# Patient Record
Sex: FEMALE | Race: WHITE | NOT HISPANIC OR LATINO
[De-identification: names, ages, dates, MRNs, and addresses within clinical notes are randomized per-mention and may not be internally consistent; named-entity substitution may affect disease eponyms.]

---

## 2017-02-14 ENCOUNTER — APPOINTMENT (OUTPATIENT)
Dept: ELECTROPHYSIOLOGY | Facility: CLINIC | Age: 82
End: 2017-02-14

## 2017-05-23 ENCOUNTER — APPOINTMENT (OUTPATIENT)
Dept: ELECTROPHYSIOLOGY | Facility: CLINIC | Age: 82
End: 2017-05-23

## 2017-08-29 ENCOUNTER — APPOINTMENT (OUTPATIENT)
Dept: ELECTROPHYSIOLOGY | Facility: CLINIC | Age: 82
End: 2017-08-29
Payer: MEDICARE

## 2017-08-29 PROCEDURE — 93294 REM INTERROG EVL PM/LDLS PM: CPT

## 2017-08-29 PROCEDURE — 93296 REM INTERROG EVL PM/IDS: CPT

## 2017-09-12 ENCOUNTER — INPATIENT (INPATIENT)
Facility: HOSPITAL | Age: 82
LOS: 5 days | Discharge: HOME | End: 2017-09-18
Attending: HOSPITALIST | Admitting: HOSPITALIST
Payer: MEDICARE

## 2017-09-12 VITALS
OXYGEN SATURATION: 100 % | DIASTOLIC BLOOD PRESSURE: 55 MMHG | RESPIRATION RATE: 18 BRPM | SYSTOLIC BLOOD PRESSURE: 137 MMHG | HEART RATE: 101 BPM | TEMPERATURE: 98 F

## 2017-09-12 DIAGNOSIS — Z95.0 PRESENCE OF CARDIAC PACEMAKER: ICD-10-CM

## 2017-09-12 DIAGNOSIS — R04.2 HEMOPTYSIS: ICD-10-CM

## 2017-09-12 DIAGNOSIS — I10 ESSENTIAL (PRIMARY) HYPERTENSION: ICD-10-CM

## 2017-09-12 DIAGNOSIS — R79.1 ABNORMAL COAGULATION PROFILE: ICD-10-CM

## 2017-09-12 DIAGNOSIS — I48.91 UNSPECIFIED ATRIAL FIBRILLATION: ICD-10-CM

## 2017-09-12 DIAGNOSIS — K92.2 GASTROINTESTINAL HEMORRHAGE, UNSPECIFIED: ICD-10-CM

## 2017-09-12 DIAGNOSIS — Z29.9 ENCOUNTER FOR PROPHYLACTIC MEASURES, UNSPECIFIED: ICD-10-CM

## 2017-09-12 LAB
ALBUMIN SERPL ELPH-MCNC: 3.4 G/DL — SIGNIFICANT CHANGE UP (ref 3.3–5)
ALP SERPL-CCNC: 46 U/L — SIGNIFICANT CHANGE UP (ref 40–120)
ALT FLD-CCNC: 30 U/L — SIGNIFICANT CHANGE UP (ref 4–33)
APTT BLD: 57.5 SEC — HIGH (ref 27.5–37.4)
AST SERPL-CCNC: 23 U/L — SIGNIFICANT CHANGE UP (ref 4–32)
BASOPHILS # BLD AUTO: 0.06 K/UL — SIGNIFICANT CHANGE UP (ref 0–0.2)
BASOPHILS NFR BLD AUTO: 0.8 % — SIGNIFICANT CHANGE UP (ref 0–2)
BILIRUB SERPL-MCNC: 1.1 MG/DL — SIGNIFICANT CHANGE UP (ref 0.2–1.2)
BLD GP AB SCN SERPL QL: NEGATIVE — SIGNIFICANT CHANGE UP
BUN SERPL-MCNC: 26 MG/DL — HIGH (ref 7–23)
CALCIUM SERPL-MCNC: 8.6 MG/DL — SIGNIFICANT CHANGE UP (ref 8.4–10.5)
CHLORIDE SERPL-SCNC: 111 MMOL/L — HIGH (ref 98–107)
CO2 SERPL-SCNC: 19 MMOL/L — LOW (ref 22–31)
CREAT SERPL-MCNC: 1.24 MG/DL — SIGNIFICANT CHANGE UP (ref 0.5–1.3)
EOSINOPHIL # BLD AUTO: 0.19 K/UL — SIGNIFICANT CHANGE UP (ref 0–0.5)
EOSINOPHIL NFR BLD AUTO: 2.5 % — SIGNIFICANT CHANGE UP (ref 0–6)
GLUCOSE SERPL-MCNC: 109 MG/DL — HIGH (ref 70–99)
HCT VFR BLD CALC: 26.9 % — LOW (ref 34.5–45)
HCT VFR BLD CALC: 31 % — LOW (ref 34.5–45)
HGB BLD-MCNC: 10.2 G/DL — LOW (ref 11.5–15.5)
HGB BLD-MCNC: 8.9 G/DL — LOW (ref 11.5–15.5)
IMM GRANULOCYTES # BLD AUTO: 0.06 # — SIGNIFICANT CHANGE UP
IMM GRANULOCYTES NFR BLD AUTO: 0.8 % — SIGNIFICANT CHANGE UP (ref 0–1.5)
INR BLD: 2.44 — HIGH (ref 0.88–1.17)
INR BLD: 6.94 — CRITICAL HIGH (ref 0.88–1.17)
LYMPHOCYTES # BLD AUTO: 0.65 K/UL — LOW (ref 1–3.3)
LYMPHOCYTES # BLD AUTO: 8.5 % — LOW (ref 13–44)
MAGNESIUM SERPL-MCNC: 1.8 MG/DL — SIGNIFICANT CHANGE UP (ref 1.6–2.6)
MCHC RBC-ENTMCNC: 29.6 PG — SIGNIFICANT CHANGE UP (ref 27–34)
MCHC RBC-ENTMCNC: 29.8 PG — SIGNIFICANT CHANGE UP (ref 27–34)
MCHC RBC-ENTMCNC: 32.9 % — SIGNIFICANT CHANGE UP (ref 32–36)
MCHC RBC-ENTMCNC: 33.1 % — SIGNIFICANT CHANGE UP (ref 32–36)
MCV RBC AUTO: 89.4 FL — SIGNIFICANT CHANGE UP (ref 80–100)
MCV RBC AUTO: 90.6 FL — SIGNIFICANT CHANGE UP (ref 80–100)
MONOCYTES # BLD AUTO: 0.82 K/UL — SIGNIFICANT CHANGE UP (ref 0–0.9)
MONOCYTES NFR BLD AUTO: 10.8 % — SIGNIFICANT CHANGE UP (ref 2–14)
NEUTROPHILS # BLD AUTO: 5.84 K/UL — SIGNIFICANT CHANGE UP (ref 1.8–7.4)
NEUTROPHILS NFR BLD AUTO: 76.6 % — SIGNIFICANT CHANGE UP (ref 43–77)
NRBC # FLD: 0 — SIGNIFICANT CHANGE UP
NRBC # FLD: 0 — SIGNIFICANT CHANGE UP
OB PNL STL: POSITIVE — SIGNIFICANT CHANGE UP
PHOSPHATE SERPL-MCNC: 2.8 MG/DL — SIGNIFICANT CHANGE UP (ref 2.5–4.5)
PLATELET # BLD AUTO: 190 K/UL — SIGNIFICANT CHANGE UP (ref 150–400)
PLATELET # BLD AUTO: 239 K/UL — SIGNIFICANT CHANGE UP (ref 150–400)
PMV BLD: 10.7 FL — SIGNIFICANT CHANGE UP (ref 7–13)
PMV BLD: 11.5 FL — SIGNIFICANT CHANGE UP (ref 7–13)
POTASSIUM SERPL-MCNC: 3.7 MMOL/L — SIGNIFICANT CHANGE UP (ref 3.5–5.3)
POTASSIUM SERPL-SCNC: 3.7 MMOL/L — SIGNIFICANT CHANGE UP (ref 3.5–5.3)
PROT SERPL-MCNC: 5.7 G/DL — LOW (ref 6–8.3)
PROTHROM AB SERPL-ACNC: 27.8 SEC — HIGH (ref 9.8–13.1)
PROTHROM AB SERPL-ACNC: 80.9 SEC — HIGH (ref 9.8–13.1)
RBC # BLD: 3.01 M/UL — LOW (ref 3.8–5.2)
RBC # BLD: 3.42 M/UL — LOW (ref 3.8–5.2)
RBC # FLD: 16.4 % — HIGH (ref 10.3–14.5)
RBC # FLD: 16.5 % — HIGH (ref 10.3–14.5)
RH IG SCN BLD-IMP: POSITIVE — SIGNIFICANT CHANGE UP
SODIUM SERPL-SCNC: 144 MMOL/L — SIGNIFICANT CHANGE UP (ref 135–145)
WBC # BLD: 7.62 K/UL — SIGNIFICANT CHANGE UP (ref 3.8–10.5)
WBC # BLD: 7.65 K/UL — SIGNIFICANT CHANGE UP (ref 3.8–10.5)
WBC # FLD AUTO: 7.62 K/UL — SIGNIFICANT CHANGE UP (ref 3.8–10.5)
WBC # FLD AUTO: 7.65 K/UL — SIGNIFICANT CHANGE UP (ref 3.8–10.5)

## 2017-09-12 PROCEDURE — 99222 1ST HOSP IP/OBS MODERATE 55: CPT | Mod: GC

## 2017-09-12 PROCEDURE — 93279 PRGRMG DEV EVAL PM/LDLS PM: CPT | Mod: 26,GC

## 2017-09-12 PROCEDURE — 99223 1ST HOSP IP/OBS HIGH 75: CPT | Mod: GC

## 2017-09-12 PROCEDURE — 71020: CPT | Mod: 26

## 2017-09-12 PROCEDURE — 74176 CT ABD & PELVIS W/O CONTRAST: CPT | Mod: 26

## 2017-09-12 PROCEDURE — 71250 CT THORAX DX C-: CPT | Mod: 26

## 2017-09-12 RX ORDER — WARFARIN SODIUM 2.5 MG/1
1 TABLET ORAL
Qty: 0 | Refills: 0 | COMMUNITY

## 2017-09-12 RX ORDER — LOSARTAN POTASSIUM 100 MG/1
50 TABLET, FILM COATED ORAL DAILY
Qty: 0 | Refills: 0 | Status: DISCONTINUED | OUTPATIENT
Start: 2017-09-12 | End: 2017-09-12

## 2017-09-12 RX ORDER — AZITHROMYCIN 500 MG/1
500 TABLET, FILM COATED ORAL EVERY 24 HOURS
Qty: 0 | Refills: 0 | Status: DISCONTINUED | OUTPATIENT
Start: 2017-09-13 | End: 2017-09-13

## 2017-09-12 RX ORDER — ATORVASTATIN CALCIUM 80 MG/1
40 TABLET, FILM COATED ORAL AT BEDTIME
Qty: 0 | Refills: 0 | Status: DISCONTINUED | OUTPATIENT
Start: 2017-09-12 | End: 2017-09-18

## 2017-09-12 RX ORDER — AZITHROMYCIN 500 MG/1
500 TABLET, FILM COATED ORAL ONCE
Qty: 0 | Refills: 0 | Status: COMPLETED | OUTPATIENT
Start: 2017-09-12 | End: 2017-09-12

## 2017-09-12 RX ORDER — CEFTRIAXONE 500 MG/1
1 INJECTION, POWDER, FOR SOLUTION INTRAMUSCULAR; INTRAVENOUS ONCE
Qty: 0 | Refills: 0 | Status: COMPLETED | OUTPATIENT
Start: 2017-09-12 | End: 2017-09-12

## 2017-09-12 RX ORDER — CEFTRIAXONE 500 MG/1
INJECTION, POWDER, FOR SOLUTION INTRAMUSCULAR; INTRAVENOUS
Qty: 0 | Refills: 0 | Status: DISCONTINUED | OUTPATIENT
Start: 2017-09-12 | End: 2017-09-13

## 2017-09-12 RX ORDER — IPRATROPIUM/ALBUTEROL SULFATE 18-103MCG
3 AEROSOL WITH ADAPTER (GRAM) INHALATION EVERY 6 HOURS
Qty: 0 | Refills: 0 | Status: DISCONTINUED | OUTPATIENT
Start: 2017-09-12 | End: 2017-09-18

## 2017-09-12 RX ORDER — PENTOXIFYLLINE 400 MG
400 TABLET, EXTENDED RELEASE ORAL THREE TIMES A DAY
Qty: 0 | Refills: 0 | Status: DISCONTINUED | OUTPATIENT
Start: 2017-09-12 | End: 2017-09-18

## 2017-09-12 RX ORDER — INFLUENZA VIRUS VACCINE 15; 15; 15; 15 UG/.5ML; UG/.5ML; UG/.5ML; UG/.5ML
0.5 SUSPENSION INTRAMUSCULAR ONCE
Qty: 0 | Refills: 0 | Status: DISCONTINUED | OUTPATIENT
Start: 2017-09-12 | End: 2017-09-18

## 2017-09-12 RX ORDER — PANTOPRAZOLE SODIUM 20 MG/1
8 TABLET, DELAYED RELEASE ORAL
Qty: 80 | Refills: 0 | Status: DISCONTINUED | OUTPATIENT
Start: 2017-09-12 | End: 2017-09-12

## 2017-09-12 RX ORDER — PANTOPRAZOLE SODIUM 20 MG/1
80 TABLET, DELAYED RELEASE ORAL ONCE
Qty: 0 | Refills: 0 | Status: COMPLETED | OUTPATIENT
Start: 2017-09-12 | End: 2017-09-12

## 2017-09-12 RX ORDER — PHYTONADIONE (VIT K1) 5 MG
5 TABLET ORAL ONCE
Qty: 0 | Refills: 0 | Status: COMPLETED | OUTPATIENT
Start: 2017-09-12 | End: 2017-09-12

## 2017-09-12 RX ORDER — AZITHROMYCIN 500 MG/1
TABLET, FILM COATED ORAL
Qty: 0 | Refills: 0 | Status: DISCONTINUED | OUTPATIENT
Start: 2017-09-12 | End: 2017-09-13

## 2017-09-12 RX ORDER — CEFTRIAXONE 500 MG/1
1 INJECTION, POWDER, FOR SOLUTION INTRAMUSCULAR; INTRAVENOUS EVERY 24 HOURS
Qty: 0 | Refills: 0 | Status: DISCONTINUED | OUTPATIENT
Start: 2017-09-13 | End: 2017-09-13

## 2017-09-12 RX ORDER — PANTOPRAZOLE SODIUM 20 MG/1
80 TABLET, DELAYED RELEASE ORAL ONCE
Qty: 0 | Refills: 0 | Status: DISCONTINUED | OUTPATIENT
Start: 2017-09-12 | End: 2017-09-12

## 2017-09-12 RX ORDER — WARFARIN SODIUM 2.5 MG/1
2 TABLET ORAL
Qty: 0 | Refills: 0 | COMMUNITY

## 2017-09-12 RX ADMIN — AZITHROMYCIN 250 MILLIGRAM(S): 500 TABLET, FILM COATED ORAL at 22:05

## 2017-09-12 RX ADMIN — Medication 5 MILLIGRAM(S): at 12:22

## 2017-09-12 RX ADMIN — PANTOPRAZOLE SODIUM 80 MILLIGRAM(S): 20 TABLET, DELAYED RELEASE ORAL at 13:02

## 2017-09-12 RX ADMIN — Medication 400 MILLIGRAM(S): at 22:06

## 2017-09-12 RX ADMIN — CEFTRIAXONE 100 GRAM(S): 500 INJECTION, POWDER, FOR SOLUTION INTRAMUSCULAR; INTRAVENOUS at 21:13

## 2017-09-12 RX ADMIN — ATORVASTATIN CALCIUM 40 MILLIGRAM(S): 80 TABLET, FILM COATED ORAL at 22:06

## 2017-09-12 NOTE — H&P ADULT - PROBLEM SELECTOR PLAN 2
CT Chest showing Right upper lobe ground-glass opacity which may represent   pulmonary hemorrhage. DDx: Lung malignancy given pt extensive smoking hx vs. bronchitis.  - Pulm consulted will f/u recs ? possible bronchoscopy  - Hb stable 10.2- will repeat stat CBC. F/u results  - Trend CBC, PT/INR  - Transfuse Hb < 7

## 2017-09-12 NOTE — H&P ADULT - NSHPLABSRESULTS_GEN_ALL_CORE
10.2   7.62  )-----------( 239      ( 12 Sep 2017 10:15 )             31.0       09-12    144  |  111<H>  |  26<H>  ----------------------------<  109<H>  3.7   |  19<L>  |  1.24    Ca    8.6      12 Sep 2017 14:30  Phos  2.8     09-12  Mg     1.8     09-12    TPro  5.7<L>  /  Alb  3.4  /  TBili  1.1  /  DBili  x   /  AST  23  /  ALT  30  /  AlkPhos  46  09-12        < from: Xray Chest 2 Views PA/Lat (09.12.17 @ 11:17) >    INTERPRETATION:     Right-sided single lead pacemaker is present. The lungs are clear. There   is a trace right and probably left effusion. No cardiomegaly or vascular   congestion to indicate CHF.      COMPARISON:  July 12, 2013 without significant change.      IMPRESSION:  Clear lungs with trace right and probably left effusions.            < end of copied text >      < from: CT Chest No Cont (09.12.17 @ 13:39) >    FINDINGS:    CHEST:     LUNGS AND LARGE AIRWAYS: Patent central airways. Central ground-glass   opacity in the right upper lobe.  PLEURA: Small right and trace left pleural effusions.  VESSELS: Within normal limits.  HEART: Cardiomegaly. Pacemaker lead in right ventricle. Coronary arterial   calcifications. No pericardial effusion.  MEDIASTINUM AND MACY: Subcentimeter mediastinal lymph nodes.  CHEST WALL AND LOWER NECK: Within normal limits.    ABDOMEN AND PELVIS:    LIVER: Within normal limits.  BILE DUCTS: Cholelithiasis.  GALLBLADDER: Within normal limits.  SPLEEN: Within normal limits.  PANCREAS: Within normal limits.  ADRENALS: 1.4 cm right adrenal nodule. Unremarkable left adrenal gland.  KIDNEYS/URETERS: 2 mm non obstructing calculus in the upper pole left   kidney.    BLADDER: Within normal limits.  REPRODUCTIVE ORGANS: Hysterectomy.    BOWEL: No bowel obstruction. Unchanged surgical anastomosis at the   descending colon. Small hiatal hernia.   PERITONEUM: No ascites.  VESSELS: Two femoral-femoral bypass graft again noted. Extensive   atherosclerotic calcifications.   RETROPERITONEUM: No lymphadenopathy.    ABDOMINAL WALL: Within normal limits.  BONES: Degenerative changes.    IMPRESSION: Right upper lobe groundglass opacity which may represent   pulmonary hemorrhage.  Small right pleural effusion.            PT/INR - ( 12 Sep 2017 10:15 )   PT: 80.9 SEC;   INR: 6.94          PTT - ( 12 Sep 2017 10:15 )  PTT:57.5 SEC    Lactate Trend            CAPILLARY BLOOD GLUCOSE

## 2017-09-12 NOTE — ED PROVIDER NOTE - PSH
S/P colon resection  1993 for colon ca.  S/P hysterectomy    S/P mastectomy  1990  S/P peripheral artery bypass  in 1994

## 2017-09-12 NOTE — ED PROVIDER NOTE - CRITICAL CARE INDICATION, MLM
patient was critically ill... Due to the presence of bleeding and arrythmia and the risk of sudden rapid deterioration due to my attendance to this patient required critical care time of approx 35 minutes including assessment/reassessment, documentation, ordering and interpreting ancillary studies, discussion with ED staff and consultants, patient and their family, and excludes time spent in teaching of Residents and time spent on separately billable procedures.

## 2017-09-12 NOTE — CONSULT NOTE ADULT - ASSESSMENT
Impression:     1. Acute anemia: hb went from 13 in 2016 to 10. She has no evidence of overt bleeding. She understands that she could have  microscopic bleeding from an ulcer or a cancer of her colon or upper GI tract or an angioectasia, but she prefers conservative therapy with watchful waiting rather than pursuing endoscopy.    2. History of colon cancer:  -patient with no desire for further surveillance.    Recommendation:  -protonix 40 daily  -await normal INR  -no plan for endoscopic evaluation per patient's wishes  -trend H/H for now  -may have usual diet.

## 2017-09-12 NOTE — ED ADULT NURSE NOTE - OBJECTIVE STATEMENT
89 y/o female presents to ED with c/o high INR.  Pt states that she went to her PMD yesterday with c/o cough and weakness x 1 week.  Pt states that she had blood drawn and a CXR was done.  Pt states that she was started on antibiotics for bronchitis, pt then states that she was called this AM and told to come to ED because her INR was high.  Pt awake alert in NAD, denies any bruising or bleeding.  No CP, no SOB, denies abd pain no fever/chills no n/v/d.  IV established labs drawn and sent.

## 2017-09-12 NOTE — CONSULT NOTE ADULT - ATTENDING COMMENTS
Pt still with hemoptysis. Advised to continue monitoring after INR reversal and with antibiotics for PNA and cough suppressants on board. Will follow.

## 2017-09-12 NOTE — H&P ADULT - PROBLEM SELECTOR PLAN 3
Given pt CT Chest, hemoptysis likely from pulmonary source however cannot r/o GI causes w/ Positive FOBT in ED. Ddx: PUD, malignancy, angiodysplasia/ectasia   - GI consulted will f/u recs for ?EGD vs. Colonoscopy.   - Transfuse Hb <7 Given findings of pt CT Chest in setting of hemoptysis, recent episode is likely from pulmonary source however cannot r/o GI causes w/ Positive FOBT in ED. DDx: PUD, malignancy, angiodysplasia/ectasia, varices  - GI consulted will f/u recs for ?EGD vs. Colonoscopy.   - Transfuse Hb <7

## 2017-09-12 NOTE — CONSULT NOTE ADULT - SUBJECTIVE AND OBJECTIVE BOX
CHIEF COMPLAINT: Hemoptosis    HPI: " 89 yo F w/ PMHx of PPM (2013), A-fib on coumadin, HTN, colon cancer s/p hemicolectomy (1993), breast cancer s/p mastectomy, LLE arterial bypass who presented to the ED  elevated INR 6.7 and Hgb 11 (baseline 13-14 based on lab records on HIE). Pt states that she was seen by her PMD and had lab work drawn yesterday and started on cefuroxime for bronchitis due to cough and was called to go into the ER as her blood count was low and INR was high. Pt states all weak she has been feeling very weak all week and was having trouble walking around and becoming short of breath. Pt denies hemoptysis with cough but was blackish brown in color. No vomiting and no abdominal pain, no blood or black colored stools but last BM 3 days prior. Pt denies double vision and headaches. Pt denies chest pain. "        PAST MEDICAL & SURGICAL HISTORY:  Pacemaker  Colon cancer  Breast cancer  HTN (hypertension)  Renal insufficiency  Atrial fibrillation  S/P hysterectomy  S/P colon resection: 1993 for colon ca.  S/P mastectomy: 1990  S/P peripheral artery bypass: in 1994      FAMILY HISTORY:  No pertinent family history in first degree relatives      SOCIAL HISTORY:  Smoking: __ packs x ___ years  EtOH Use:  Marital Status:  Occupation:  Recent Travel:  Country of Birth:  Advance Directives:    Allergies    No Known Allergies    Intolerances        HOME MEDICATIONS:    REVIEW OF SYSTEMS:    [ ] All other systems negative  [ ] Unable to assess ROS because ________    OBJECTIVE:  ICU Vital Signs Last 24 Hrs  T(C): 36.8 (12 Sep 2017 10:00), Max: 36.8 (12 Sep 2017 10:00)  T(F): 98.3 (12 Sep 2017 10:00), Max: 98.3 (12 Sep 2017 10:00)  HR: 101 (12 Sep 2017 10:00) (101 - 101)  BP: 137/55 (12 Sep 2017 10:00) (137/55 - 137/55)  BP(mean): --  ABP: --  ABP(mean): --  RR: 18 (12 Sep 2017 10:00) (18 - 18)  SpO2: 100% (12 Sep 2017 10:00) (100% - 100%)        CAPILLARY BLOOD GLUCOSE          PHYSICAL EXAM:  General: Awake, alert, oriented X 3.   HEENT: Atraumatic, normocephalic.                Mallampatti Grade 4  Lymph Nodes: No palpable lymphadenopathy  Neck: No JVD. No carotid bruit.   Respiratory: Bilateral inspiratory and expiratory crackles, more at the base than the apex. No wheezing.   Cardiovascular: S1 S2 normal.Abdomen: Soft, non-tender, non-distended. No organomegaly.  Extremities: Warm to touch. 1+ pitting edema noted in both lower extremities this am.  Skin: No rashes or skin lesions  Neurological: Motor and sensory examination equal and normal in all four extremities.  Psychiatry: Appropriate mood and affect.    HOSPITAL MEDICATIONS:  MEDICATIONS  (STANDING):  atorvastatin 40 milliGRAM(s) Oral at bedtime  pentoxifylline 400 milliGRAM(s) Oral three times a day    MEDICATIONS  (PRN):      LABS:                        10.2   7.62  )-----------( 239      ( 12 Sep 2017 10:15 )             31.0     09-12    144  |  111<H>  |  26<H>  ----------------------------<  109<H>  3.7   |  19<L>  |  1.24    Ca    8.6      12 Sep 2017 14:30  Phos  2.8     09-12  Mg     1.8     09-12    TPro  5.7<L>  /  Alb  3.4  /  TBili  1.1  /  DBili  x   /  AST  23  /  ALT  30  /  AlkPhos  46  09-12    PT/INR - ( 12 Sep 2017 10:15 )   PT: 80.9 SEC;   INR: 6.94          PTT - ( 12 Sep 2017 10:15 )  PTT:57.5 SEC          MICROBIOLOGY:     RADIOLOGY:  [x] Reviewed and interpreted by me  Significant amount of ground glass on the right side with out Consolidation or mass.  May reperesent Blood vs Blood super imposed on infection.         EKG: CHIEF COMPLAINT: Hemoptosis    HPI: 88 yo F w/ PMHx of PPM (), A-fib on coumadin, HTN, colon cancer s/p hemicolectomy (), breast cancer s/p mastectomy, LLE arterial bypass who was sent in by her PMD  for elevated INR and hemoptysis.  She has been unwell for almost 3 weeks with intial URI symptoms that evoloved to sweats, chills, and chest tight ness, 5 days prior to presentation she had developed a cough with some blood streeks, that progressed to clots.  Small volume and always mixed with sputum.   She went to her PMD who took labs, and started on abx.  Once her INR came back he sent her to the ED.    She deneis sick contacts, fevers, SOB, nausea or vomiting.  Does say she ahs periodic BRight red streaks on her BMS but this is the first time she had it in her sputum.    ROS+ for chest tight ness and wheeze during coughing fits. No weight loss but poor apetite over the last 3 weeks.     PAST MEDICAL & SURGICAL HISTORY:  Pacemaker  Colon cancer  Breast cancer  HTN (hypertension)  Renal insufficiency  Atrial fibrillation  S/P hysterectomy  S/P colon resection:  for colon ca.  S/P mastectomy:   S/P peripheral artery bypass: in       FAMILY HISTORY:  No pertinent family history in first degree relatives      SOCIAL HISTORY:  Smokinpack years  EtOH Use:  Marital Status:  Occupation:  Recent Travel:  Country of Birth: [facility]  Advance Directives: DNR/DNI    Allergies    No Known Allergies    Intolerances        HOME MEDICATIONS:    REVIEW OF SYSTEMS:    [x] All other systems negative  [ ] Unable to assess ROS because ________    OBJECTIVE:  ICU Vital Signs Last 24 Hrs  T(C): 36.8 (12 Sep 2017 10:00), Max: 36.8 (12 Sep 2017 10:00)  T(F): 98.3 (12 Sep 2017 10:00), Max: 98.3 (12 Sep 2017 10:00)  HR: 101 (12 Sep 2017 10:00) (101 - 101)  BP: 137/55 (12 Sep 2017 10:00) (137/55 - 137/55)  BP(mean): --  ABP: --  ABP(mean): --  RR: 18 (12 Sep 2017 10:00) (18 - 18)  SpO2: 100% (12 Sep 2017 10:00) (100% - 100%)        CAPILLARY BLOOD GLUCOSE          PHYSICAL EXAM:  Se below    HOSPITAL MEDICATIONS:  MEDICATIONS  (STANDING):  atorvastatin 40 milliGRAM(s) Oral at bedtime  pentoxifylline 400 milliGRAM(s) Oral three times a day    MEDICATIONS  (PRN):      LABS:                        10.2   7.62  )-----------( 239      ( 12 Sep 2017 10:15 )             31.0     09-12    144  |  111<H>  |  26<H>  ----------------------------<  109<H>  3.7   |  19<L>  |  1.24    Ca    8.6      12 Sep 2017 14:30  Phos  2.8     09-12  Mg     1.8     -12    TPro  5.7<L>  /  Alb  3.4  /  TBili  1.1  /  DBili  x   /  AST  23  /  ALT  30  /  AlkPhos  46  09-12    PT/INR - ( 12 Sep 2017 10:15 )   PT: 80.9 SEC;   INR: 6.94          PTT - ( 12 Sep 2017 10:15 )  PTT:57.5 SEC          MICROBIOLOGY:     RADIOLOGY:  [x] Reviewed and interpreted by me  Significant amount of ground glass on the right side with out Consolidation or mass.  May reperesent Blood vs Blood super imposed on infection.         EKG:

## 2017-09-12 NOTE — ED PROVIDER NOTE - ATTENDING CONTRIBUTION TO CARE
I was physically present for the E/M service provided. I agree with above history, physical, and plan which I have reviewed and edited where appropriate. I was physically present for the key portions of the service provided.    89 yo F w/ PMHx of PPM, A-fib on coumadin, HTN who p/w elevated INR 6.7 and Hgb 11 (baseline 13-14 based on lab records on HIE). lab drawn yesterday after c/o cough and hemoptysis x 1 wk.  INR was elevated and told to come to ed.  Pt denies any change in meds except for having started on cefepime 1 day ago. + decrease appetite. no vomiting, no diarrhea, no bloody stool, no hematuria, no cp, no sob, no palpitations. pt checks her PPm at home q3 month and goes to office annual for check.  denies any trauma or syncope.    *GEN:   comfortable, in no apparent distress, AOx3  *EYES:   PERRL, extra-occular movements intact  *HEENT:   airway patent, moist mucosal membranes, uvula midline  *CV:   regular rate and rhythm, normal S1/S2, no murmur  *RESP:   clear to auscultation bilaterally, non-labored, speaking in full sentences  *ABD:   soft, non tender, no guarding  *:   no cva tenderness  *MSK:   no musculoskeletal tenderness, 5/5 strength, moving all extremity  *SKIN:   dry, intact, no rash  *NEURO:   AOx3, no focal weakness or loss of sensation, gait normal, GCS 15    Concern for GIB due to elevated INR vs lung ca vs bronchitis.  will obtain labs, ct chest and pelvis w/o due to ckd  + guaiaic. h/h 10.2/31. EKg shows inappropriate PPm capture at T wave.  cardiology consulted and ep eval -states oversensing and changed the setting to appropriately capture.  INR 6.4.  Vitamin k 5mg po given.  PPI bolus.  GI and pulm consulted.  Right upper lobe groundglass opacity which may represent   pulmonary hemorrhage.  Will admit to medicine tele for GIB with elevated INR.  also abnormal PPM.  pt is hemodynamically stable.

## 2017-09-12 NOTE — ED PROVIDER NOTE - MEDICAL DECISION MAKING DETAILS
Pt is an 89 yo F w/ PMHx of PPM, A-fib on coumadin, HTN who p/w Pt is an 89 yo F w/ PMHx of PPM, A-fib on coumadin, HTN who p/w elevated INR 6.7 and Hgb 11 and hemoptysis not hemodynamically unstable and no neurologic symptoms no need for head imaging at this time. Patient with non-massive hemoptysis and coughing for 3 days will do CXR and CT chest A/P w/out con given renal dysfunction in past. Will also get CBC, CMP, PT/INR, aPTT, type and screen, Will give oral Vit K, patient consented for blood and patient with advanced directive on her person and is DNR/I with no aggressive medical interventions that would prolong her life if acutely dying. Also abnormal capture of PPM on EKG and will need EP consult. Patient will need admission for hemoptysis and elevated INR. Will notify Pts PMD.

## 2017-09-12 NOTE — H&P ADULT - ASSESSMENT
89 yo F w/ PMHx of PPM (2013), A-fib on coumadin, HTN, colon cancer s/p hemicolectomy (1993), breast cancer s/p mastectomy, LLE arterial bypass who presented to the ED per referral from her PMD for elevated INR s/p episode of hemoptysis.

## 2017-09-12 NOTE — H&P ADULT - NSHPPHYSICALEXAM_GEN_ALL_CORE
PHYSICAL EXAM:    General: Well appearing female, No acute distress, lying comfortably in bed, AAOX3  HEENT: Normocephalic, atraumatic.  PERRL.  EOMI.  No scleral icterus.  Moist MM.  No oropharyngeal exudates.    Neck: Supple.  Full range of motion.  No JVD.  No carotid bruits.  No thyromegaly.  Trachea midline.  No lymphadenopathy.   Heart: RRR.  Normal S1 and S2.  No murmurs, rubs, or gallops.   Lungs: CTAB. No wheezes, crackles, or rhonchi.    Abdomen: BS+, soft, NT/ND.  No organomegaly.  Skin: Warm and dry.  No rashes.  Extremities: No edema, clubbing, or cyanosis.  2+ peripheral pulses b/l.  Musculoskeletal: No deformities.  No spinal or paraspinal tenderness.  Neuro: CN II-XII intact.  5/5 strength in UE and LE b/l.  Tactile sensation intact in UE and LE b/l.  Cerebellar function intact as assessed by finger-to-nose test.

## 2017-09-12 NOTE — H&P ADULT - HISTORY OF PRESENT ILLNESS
Pt is an 89 yo F w/ PMHx of PPM (2013), A-fib on coumadin, HTN, colon cancer s/p hemicolectomy (1993), breast cancer s/p mastectomy, LLE arterial bypass who presented to the ED per reffe who p/w elevated INR 6.7 and Hgb 11 (baseline 13-14 based on lab records on HIE). Pt states that she was seen by her PMD and had lab work drawn yesterday and started on cefuroxime for bronchitis due to cough and was called to go into the ER as her blood count was low and INR was high. Pt states all weak she has been feeling very weak all week and was having trouble walking around and becoming short of breath. Pt denies hemoptysis with cough but was blackish brown in color. No vomiting and no abdominal pain, no blood or black colored stools but last BM 3 days prior. Pt denies double vision and headaches. Pt denies chest pain. 88 yo F w/ PMHx of PPM (2013), A-fib on coumadin, HTN, colon cancer s/p hemicolectomy (1993), breast cancer s/p mastectomy, LLE arterial bypass who presented to the ED per referral from her PMD for elevated INR. Pt was in her usual state of health until last week when she began experiencing weakness, lethargy, SOB, and productive cough. Pt notes her symptoms progressively worsened throughout last week prompting her to visit her PMD. Pt was given Cefuroxime x 3 doses and stopped for presumed bronchitis. Pt was called this AM for elevated INR of 6.9 and Hb 11 (baseline 13-14 based on lab records on HIE). Pt endorses week long hx of productive cough of brown/black sputum which she says she has never experienced before. This AM pt reports coughing up bright red blood. Pt says she has been adherent and strict with her coumadin and takes her medications appropriately.  Pt endorses smoking 2-3ppd for 40+ years- quit in 1993. No FH of colon CA or coagulopathies.  Denies any new medications other than Cefuroxime at PMD, changes in diet, weight loss, fevers, chills,n/v, cp, sob, abd pain, hematuria, hematochezia, or melena. 89 yo F w/ PMHx of PPM (2013), A-fib on coumadin, HTN, colon cancer s/p hemicolectomy (1993), breast cancer s/p mastectomy, LLE arterial bypass who presented to the ED per referral from her PMD for elevated INR. Pt was in her usual state of health until last week when she began experiencing weakness, lethargy, SOB, and productive cough. Pt notes her symptoms progressively worsened throughout last week prompting her to visit her PMD. Pt was given Cefuroxime for presumed bronchitis- pt stopped after 3 doses. Pt was called this AM for elevated INR of 6.9 and Hb 11 (baseline 13-14 based on lab records on HIE). Repeat in ED 6.7. Pt endorses week long hx of productive cough of brown/black sputum which she says she has never experienced before. This AM pt reports coughing up bright red blood. Pt reports she has been adherent and strict with her coumadin and takes her medications appropriately.  Pt endorses smoking 2-3ppd for 40+ years- quit in 1993. No FH of colon CA or coagulopathies.  Denies any new medications other than Cefuroxime at PMD, changes in diet, weight loss, fevers, chills,n/v, cp, sob, abd pain, hematuria, hematochezia, or melena.

## 2017-09-12 NOTE — ED ADULT TRIAGE NOTE - CHIEF COMPLAINT QUOTE
Pt states her doctor called her today and told her to go to ED for elevated INR. Unsure of exact value. On coumadin.

## 2017-09-12 NOTE — CONSULT NOTE ADULT - ASSESSMENT
89 yo F w/ PMHx of PPM (2013), A-fib on coumadin, HTN, colon cancer s/p hemicolectomy (1993), breast cancer s/p mastectomy, LLE arterial bypass presented with hemoptysis in the setting of bronchitis and an elevated INR. 89 yo F w/ PMHx of PPM (2013), A-fib on coumadin, HTN, colon cancer s/p hemicolectomy (1993), breast cancer s/p mastectomy, LLE arterial bypass presented with hemoptysis in the setting of bronchitis/pna related cough and an elevated INR.  - Reverse INR, continous pulse oximetery  - would Complete course of ABX for CAP as unable to r/o PNA in the setting of GGO and hemorahge.   - PRN lucas.  - Consider follow up Imaging after resolution of hemoptosis to evaluate for underlying eteology (infection, malignancy).     - Will follow   Mckinley Roy MD PGY5  Pulmonary and Critical Care Fellow  p# [phone]

## 2017-09-12 NOTE — H&P ADULT - PROBLEM SELECTOR PLAN 1
Elevated INR 6.7 on admission. S/p 5 units vit K in ED  Ddx: Diet changes, medication non adherence, recent Cefuroxime at PMD for presumed bronchitis  - Holding coumadin- will repeat INR in AM.   - Elevated INR 6.7 on admission. S/p 5 units vit K in ED  DDx: Diet changes, medication non adherence, recent Cefuroxime at PMD for presumed bronchitis  - Holding coumadin- will repeat INR in AM. Elevated INR 6.7 on admission. S/p 5 units vit K in ED  DDx: Diet changes, medication non adherence, recent Cefuroxime at PMD for presumed bronchitis  - Holding coumadin- will repeat INR in AM.  - Pt s/p Vitamin K

## 2017-09-12 NOTE — H&P ADULT - PMH
Atrial fibrillation    Breast cancer    Colon cancer    HTN (hypertension)    Pacemaker    Renal insufficiency

## 2017-09-12 NOTE — ED PROVIDER NOTE - PROGRESS NOTE DETAILS
GI consulted. Given IVP protonix. Will admit to medicine hospitalist. Left a message for PMD.   -Zan Dove PGY3 EP saw pt and patient under sensing and adjusted sensitivities.   -Zan Dove PGY3

## 2017-09-12 NOTE — ED ADULT NURSE NOTE - CHPI ED SYMPTOMS NEG
no pain/no dizziness/no nausea/no numbness/no tingling/no chills/no fever/no decreased eating/drinking/no vomiting

## 2017-09-12 NOTE — H&P ADULT - PROBLEM SELECTOR PLAN 5
Home meds Norvasc 10mg QD and Losartan 50mg qd  Will hold home BP meds for now and re-evaluate in AM given recent episode of hemoptysis and drop in Hb.  - Pt currently hemodynamically stable, will cont to monitor.

## 2017-09-12 NOTE — H&P ADULT - PROBLEM SELECTOR PLAN 4
EP consulted in ED to evaluate PPM. PPM showed +RV lead under-sensing with over pacing and excellent threshold capture. EP consulted in ED to evaluate PPM. PPM showed +RV lead under-sensing with over pacing and excellent threshold capture. PPM reprogrammed to avoid under sensing.

## 2017-09-12 NOTE — H&P ADULT - NSHPSOCIALHISTORY_GEN_ALL_CORE
Smoked 2-3 ppd for 40+ years- quit in 1993  Denies any ETOH abuse or recreational drug use  Lives alone at home. No children or family in her life at this time. Fully functional- able to do ADL's independently w/o difficulty.

## 2017-09-12 NOTE — CONSULT NOTE ADULT - SUBJECTIVE AND OBJECTIVE BOX
Chief Complaint:  Patient is a 89y old  Female who presents with a chief complaint of Elevated INR (12 Sep 2017 17:14)      HPI: This is an 89 year old female with a hx of afib on warfarin, colon cancer s/p resection in the 90s who presented with supratherapeutic INR. The patient was started on cefuroxime 2 days PTA for a PNA diagnosed by her outpatient PMD. She endorsed CASAS and generalized fatigue. She denies melena hematochezia, hematuria or vaginal bleeding. She had no syncope or presyncope or abdominal pain. She does not use NSAIDs. Her PMD called her and sent her to ED for elevated INR but didnt say how high it was. Her last colonoscopy was many years ago and she is unsure if she has had upper endoscopy.    Allergies:  No Known Allergies      Home Medications:    Hospital Medications:  atorvastatin 40 milliGRAM(s) Oral at bedtime  pentoxifylline 400 milliGRAM(s) Oral three times a day  cefTRIAXone   IVPB 1 Gram(s) IV Intermittent once  azithromycin  IVPB 500 milliGRAM(s) IV Intermittent once  ALBUTerol/ipratropium for Nebulization 3 milliLiter(s) Nebulizer every 6 hours PRN  cefTRIAXone   IVPB      azithromycin  IVPB          PMHX/PSHX:  Pacemaker  Colon cancer  Breast cancer  HTN (hypertension)  Renal insufficiency  Atrial fibrillation  S/P hysterectomy  S/P colon resection  S/P mastectomy  S/P peripheral artery bypass      Family history:  No pertinent family history in first degree relatives      Social History:     ROS:     General:  No wt loss, fevers, chills, night sweats, fatigue,   Eyes:  Good vision, no reported pain  ENT:  No sore throat, pain, runny nose, dysphagia  CV:  No pain, palpitations, hypo/hypertension  Resp:  No dyspnea, cough, tachypnea, wheezing  GI:  See HPI  :  No pain, bleeding, incontinence, nocturia  Muscle:  No pain, weakness  Neuro:  No weakness, tingling, memory problems  Psych:  No fatigue, insomnia, mood problems, depression  Endocrine:  No polyuria, polydipsia, cold/heat intolerance  Heme:  No petechiae, ecchymosis, easy bruisability  Skin:  No rash, edema      PHYSICAL EXAM:     GENERAL:  Appears stated age, well-groomed, well-nourished, no distress  HEENT:  NC/AT,  conjunctivae clear and pink,  no JVD  CHEST:  RLL crackles  HEART:  irregular rhythm, S1, S2, no murmur/rub/S3/S4, no abdominal bruit, no edema  ABDOMEN:  Soft, non-tender, non-distended, normoactive bowel sounds,  no masses , no hepatosplenomegaly  EXTREMITIES:  no cyanosis,clubbing or edema  SKIN:  No rash/erythema/ecchymoses/petechiae/wounds/abscess/warm/dry  NEURO:  Alert, oriented  RECTAL:Brown stool, no masses palpated.    Vital Signs:  Vital Signs Last 24 Hrs  T(C): 37.1 (12 Sep 2017 18:52), Max: 37.1 (12 Sep 2017 18:52)  T(F): 98.8 (12 Sep 2017 18:52), Max: 98.8 (12 Sep 2017 18:52)  HR: 94 (12 Sep 2017 18:52) (94 - 101)  BP: 143/67 (12 Sep 2017 18:52) (137/55 - 143/67)  BP(mean): --  RR: 18 (12 Sep 2017 18:52) (18 - 18)  SpO2: 98% (12 Sep 2017 18:52) (98% - 100%)  Daily     Daily     LABS:                        10.2   7.62  )-----------( 239      ( 12 Sep 2017 10:15 )             31.0     09-12    144  |  111<H>  |  26<H>  ----------------------------<  109<H>  3.7   |  19<L>  |  1.24    Ca    8.6      12 Sep 2017 14:30  Phos  2.8     09-12  Mg     1.8     09-12    TPro  5.7<L>  /  Alb  3.4  /  TBili  1.1  /  DBili  x   /  AST  23  /  ALT  30  /  AlkPhos  46  09-12    LIVER FUNCTIONS - ( 12 Sep 2017 14:30 )  Alb: 3.4 g/dL / Pro: 5.7 g/dL / ALK PHOS: 46 u/L / ALT: 30 u/L / AST: 23 u/L / GGT: x           PT/INR - ( 12 Sep 2017 10:15 )   PT: 80.9 SEC;   INR: 6.94          PTT - ( 12 Sep 2017 10:15 )  PTT:57.5 SEC        Imaging:

## 2017-09-12 NOTE — ED PROVIDER NOTE - OBJECTIVE STATEMENT
Pt is an 88 yo F w/ PMHx of PPM, A-fib on coumadin, HTN who p/w elevated INR 6.7 and Hgb 11 (baseline 13-14 based on lab records on [facility]). Pt states that she was seen by her PMD and had lab work drawn today and was called to go into the ER as her blood count was low and INR was high. Pt states Pt is an 89 yo F w/ PMHx of PPM, A-fib on coumadin, HTN who p/w elevated INR 6.7 and Hgb 11 (baseline 13-14 based on lab records on [facility]). Pt states that she was seen by her PMD and had lab work drawn yesterday and started on cefuroxime for bronchitis due to cough and was called to go into the ER as her blood count was low and INR was high. Pt states all weak she has been feeling very weak all week and was having trouble walking around and becoming short of breath. Pt denies hemoptysis with cough but was blackish brown in color. No vomiting and no abdominal pain, no blood or black colored stools but last BM 3 days prior. Pt denies double vision and headaches. Pt denies chest pain.     PMD: Dr. Cool [phone]

## 2017-09-13 LAB
BLD GP AB SCN SERPL QL: NEGATIVE — SIGNIFICANT CHANGE UP
BUN SERPL-MCNC: 25 MG/DL — HIGH (ref 7–23)
CALCIUM SERPL-MCNC: 8.7 MG/DL — SIGNIFICANT CHANGE UP (ref 8.4–10.5)
CHLORIDE SERPL-SCNC: 112 MMOL/L — HIGH (ref 98–107)
CO2 SERPL-SCNC: 20 MMOL/L — LOW (ref 22–31)
CREAT SERPL-MCNC: 1.22 MG/DL — SIGNIFICANT CHANGE UP (ref 0.5–1.3)
GLUCOSE SERPL-MCNC: 84 MG/DL — SIGNIFICANT CHANGE UP (ref 70–99)
HCT VFR BLD CALC: 27 % — LOW (ref 34.5–45)
HGB BLD-MCNC: 9 G/DL — LOW (ref 11.5–15.5)
INR BLD: 1.78 — HIGH (ref 0.88–1.17)
MAGNESIUM SERPL-MCNC: 1.8 MG/DL — SIGNIFICANT CHANGE UP (ref 1.6–2.6)
MCHC RBC-ENTMCNC: 30.2 PG — SIGNIFICANT CHANGE UP (ref 27–34)
MCHC RBC-ENTMCNC: 33.3 % — SIGNIFICANT CHANGE UP (ref 32–36)
MCV RBC AUTO: 90.6 FL — SIGNIFICANT CHANGE UP (ref 80–100)
NRBC # FLD: 0 — SIGNIFICANT CHANGE UP
PHOSPHATE SERPL-MCNC: 2.4 MG/DL — LOW (ref 2.5–4.5)
PLATELET # BLD AUTO: 210 K/UL — SIGNIFICANT CHANGE UP (ref 150–400)
PMV BLD: 11.4 FL — SIGNIFICANT CHANGE UP (ref 7–13)
POTASSIUM SERPL-MCNC: 3.6 MMOL/L — SIGNIFICANT CHANGE UP (ref 3.5–5.3)
POTASSIUM SERPL-SCNC: 3.6 MMOL/L — SIGNIFICANT CHANGE UP (ref 3.5–5.3)
PROTHROM AB SERPL-ACNC: 20.2 SEC — HIGH (ref 9.8–13.1)
RBC # BLD: 2.98 M/UL — LOW (ref 3.8–5.2)
RBC # FLD: 16.4 % — HIGH (ref 10.3–14.5)
RH IG SCN BLD-IMP: POSITIVE — SIGNIFICANT CHANGE UP
SODIUM SERPL-SCNC: 146 MMOL/L — HIGH (ref 135–145)
WBC # BLD: 8.45 K/UL — SIGNIFICANT CHANGE UP (ref 3.8–10.5)
WBC # FLD AUTO: 8.45 K/UL — SIGNIFICANT CHANGE UP (ref 3.8–10.5)

## 2017-09-13 PROCEDURE — 99232 SBSQ HOSP IP/OBS MODERATE 35: CPT | Mod: GC

## 2017-09-13 PROCEDURE — 99222 1ST HOSP IP/OBS MODERATE 55: CPT | Mod: GC

## 2017-09-13 PROCEDURE — 99233 SBSQ HOSP IP/OBS HIGH 50: CPT | Mod: GC

## 2017-09-13 RX ORDER — PANTOPRAZOLE SODIUM 20 MG/1
40 TABLET, DELAYED RELEASE ORAL DAILY
Qty: 0 | Refills: 0 | Status: DISCONTINUED | OUTPATIENT
Start: 2017-09-13 | End: 2017-09-13

## 2017-09-13 RX ORDER — POTASSIUM PHOSPHATE, MONOBASIC POTASSIUM PHOSPHATE, DIBASIC 236; 224 MG/ML; MG/ML
15 INJECTION, SOLUTION INTRAVENOUS ONCE
Qty: 0 | Refills: 0 | Status: COMPLETED | OUTPATIENT
Start: 2017-09-13 | End: 2017-09-13

## 2017-09-13 RX ORDER — PANTOPRAZOLE SODIUM 20 MG/1
40 TABLET, DELAYED RELEASE ORAL
Qty: 0 | Refills: 0 | Status: DISCONTINUED | OUTPATIENT
Start: 2017-09-13 | End: 2017-09-18

## 2017-09-13 RX ORDER — AZITHROMYCIN 500 MG/1
500 TABLET, FILM COATED ORAL EVERY 24 HOURS
Qty: 0 | Refills: 0 | Status: DISCONTINUED | OUTPATIENT
Start: 2017-09-13 | End: 2017-09-18

## 2017-09-13 RX ORDER — CEFTRIAXONE 500 MG/1
1 INJECTION, POWDER, FOR SOLUTION INTRAMUSCULAR; INTRAVENOUS EVERY 24 HOURS
Qty: 0 | Refills: 0 | Status: DISCONTINUED | OUTPATIENT
Start: 2017-09-13 | End: 2017-09-18

## 2017-09-13 RX ADMIN — Medication 100 MILLIGRAM(S): at 17:19

## 2017-09-13 RX ADMIN — Medication 200 MILLIGRAM(S): at 12:25

## 2017-09-13 RX ADMIN — POTASSIUM PHOSPHATE, MONOBASIC POTASSIUM PHOSPHATE, DIBASIC 62.5 MILLIMOLE(S): 236; 224 INJECTION, SOLUTION INTRAVENOUS at 09:19

## 2017-09-13 RX ADMIN — CEFTRIAXONE 100 GRAM(S): 500 INJECTION, POWDER, FOR SOLUTION INTRAMUSCULAR; INTRAVENOUS at 17:19

## 2017-09-13 RX ADMIN — Medication 400 MILLIGRAM(S): at 14:34

## 2017-09-13 RX ADMIN — PANTOPRAZOLE SODIUM 40 MILLIGRAM(S): 20 TABLET, DELAYED RELEASE ORAL at 12:28

## 2017-09-13 RX ADMIN — ATORVASTATIN CALCIUM 40 MILLIGRAM(S): 80 TABLET, FILM COATED ORAL at 22:06

## 2017-09-13 RX ADMIN — AZITHROMYCIN 250 MILLIGRAM(S): 500 TABLET, FILM COATED ORAL at 17:18

## 2017-09-13 RX ADMIN — Medication 400 MILLIGRAM(S): at 05:16

## 2017-09-13 RX ADMIN — Medication 400 MILLIGRAM(S): at 22:06

## 2017-09-13 NOTE — PHYSICAL THERAPY INITIAL EVALUATION ADULT - PERTINENT HX OF CURRENT PROBLEM, REHAB EVAL
89 y.o Female w/ PMHx of PPM, A-fib on coumadin, HTN, colon cancer, breast cancer s/p mastectomy, LLE arterial bypass who presented to the ED per referral from her PMD for elevated INR s/p episode of hemoptysis.

## 2017-09-13 NOTE — PROGRESS NOTE ADULT - SUBJECTIVE AND OBJECTIVE BOX
Chief complaint: Patient is a 89y old Female who was seen for follow up for elevated INR (12 Sep 2017 17:14)      SUBJECTIVE / OVERNIGHT EVENTS: No acute events over night. Pt reports feeling well. No active complaints. Repeat Hb 8.9 INR 2.44----This AM Hb 9 INR 1.78. Denies any fevers, chills, n/v, cp, sob, abd pain, hematuria, hematochezia, or melena.    MEDICATIONS  (STANDING):  atorvastatin 40 milliGRAM(s) Oral at bedtime  pentoxifylline 400 milliGRAM(s) Oral three times a day  cefTRIAXone   IVPB      azithromycin  IVPB      cefTRIAXone   IVPB 1 Gram(s) IV Intermittent every 24 hours  azithromycin  IVPB 500 milliGRAM(s) IV Intermittent every 24 hours  influenza   Vaccine 0.5 milliLiter(s) IntraMuscular once    MEDICATIONS  (PRN):  ALBUTerol/ipratropium for Nebulization 3 milliLiter(s) Nebulizer every 6 hours PRN Shortness of Breath and/or Wheezing      T(C): 37.1 (09-13-17 @ 05:13), Max: 37.3 (09-13-17 @ 00:55)  HR: 81 (09-13-17 @ 05:13) (81 - 101)  BP: 133/73 (09-13-17 @ 05:13) (122/64 - 143/67)  RR: 17 (09-13-17 @ 05:13) (17 - 20)  SpO2: 96% (09-13-17 @ 05:13) (96% - 100%)    PHYSICAL EXAM:  GENERAL: NAD, well-developed  HEAD:  Atraumatic, Normocephalic  EYES: EOMI, PERRLA, conjunctiva and sclera clear  NECK: Supple, No JVD  CHEST/LUNG: Clear to auscultation bilaterally; No wheeze  HEART: Regular rate and rhythm; No murmurs, rubs, or gallops  ABDOMEN: Soft, Nontender, Nondistended; Bowel sounds present  EXTREMITIES:  2+ Peripheral Pulses, No clubbing, cyanosis, or edema  PSYCH: AAOx3  NEUROLOGY: non-focal  SKIN: No rashes or lesions    LABS:                        9.0    8.45  )-----------( 210      ( 13 Sep 2017 05:07 )             27.0     WBC Trend: 8.45<--, 7.65<--, 7.62<--  09-12    144  |  111<H>  |  26<H>  ----------------------------<  109<H>  3.7   |  19<L>  |  1.24    Ca    8.6      12 Sep 2017 14:30  Phos  2.8     09-12  Mg     1.8     09-12    TPro  5.7<L>  /  Alb  3.4  /  TBili  1.1  /  DBili  x   /  AST  23  /  ALT  30  /  AlkPhos  46  09-12    Creatinine Trend: 1.24<--  PT/INR - ( 13 Sep 2017 05:07 )   PT: 20.2 SEC;   INR: 1.78          PTT - ( 12 Sep 2017 10:15 )  PTT:57.5 SEC            RADIOLOGY & ADDITIONAL TESTS:    Imaging Personally Reviewed:    Consultant(s) Notes Reviewed:      Care Discussed with Consultants/Other Providers:

## 2017-09-13 NOTE — PROGRESS NOTE ADULT - PROBLEM SELECTOR PLAN 1
Elevated INR 6.7 on admission. S/p 5 units vit K in ED  DDx: Diet changes, medication non adherence, recent Cefuroxime at PMD for presumed bronchitis  - Holding coumadin- repeat Hb 9/INR 1.78 (9/13/17)  - Pt s/p Vitamin K and FFP

## 2017-09-13 NOTE — PROGRESS NOTE ADULT - ASSESSMENT
Impression:   1. Anemia: unclear acuity as last blood count was in 3/16. Patient denies overt GI bleeding and there was no melena on rectal exam. She is not interested in colonoscopy but would consider upper endoscopy. She could have slow GI bleeding from PUD, angiodysplasia or esophagogastric malignancy or colorectal cancer. Given significant dyspnea and possible    2. Dyspnea.    Recommendation:  - Impression:   1. Anemia: unclear acuity as last blood count was in 3/16. Patient denies overt GI bleeding and there was no melena on rectal exam. She is not interested in colonoscopy but would consider upper endoscopy. She could have slow GI bleeding from PUD, angiodysplasia or esophagogastric malignancy or colorectal cancer. Given significant dyspnea and possible    2. Dyspnea.    Recommendation:  -optimize from a cardiopulmonary perspective and then would consider non-emergent EGD if the patient is amenable.

## 2017-09-13 NOTE — PROGRESS NOTE ADULT - ASSESSMENT
88 yo F w/ PMHx of PPM (2013), A-fib on coumadin, HTN, colon cancer s/p hemicolectomy (1993), breast cancer s/p mastectomy, LLE arterial bypass who presented to the ED per referral from her PMD for elevated INR s/p episode of hemoptysis.

## 2017-09-13 NOTE — PROGRESS NOTE ADULT - ATTENDING COMMENTS
Pt seen and examined, chart and labs reviewed.  A very pleasant 89F with hx as above who presents with supratherapeutic INR in setting of hemoptysis and FOBT+ stool.  Pt is fully cognizant and able to make medical decisions and refusing endoscopic intervention for possible GI bleed.  INR now improved s/p Vit K therapy, however after discussion with cardiologist, we including the patient are in agreement with stopping her coumadin altogether and resuming high dose aspirin.  Appreciate pulmonary recommendations and will continue CAP coverage and pt will need surveillance CT scan in near future to assess pulmonary hemorrhage.     #Acute blood loss anemia in setting of supratherapeutic INR, hemoptysis, possible GI bleed: pt declining GI intervention.  Continue to monitor hemoptysis.  INR now improved s/p Vit K, will stop Coumadin indefinitely and start high dose ASA (discussed with outpt cardiologist)     Agree with remainder of resident note as above.

## 2017-09-13 NOTE — PROGRESS NOTE ADULT - PROBLEM SELECTOR PLAN 3
Given findings of pt CT Chest in setting of hemoptysis, recent episode is likely from pulmonary source however cannot r/o GI causes w/ Positive FOBT in ED. DDx: PUD, malignancy, angiodysplasia/ectasia, varices  - GI recs: Protonix 40mg QD, Pt aware of possibility of GI bleed+ risks/benefits of intervention however, she would not like any invasive procedure at this time  - Transfuse Hb <7

## 2017-09-13 NOTE — PROGRESS NOTE ADULT - SUBJECTIVE AND OBJECTIVE BOX
Patient is a 89y old  Female who presents with a chief complaint of Elevated INR (12 Sep 2017 17:14)      SUBJECTIVE / OVERNIGHT EVENTS: Patient with significant shortness of breath and weakness. Denies melena or hematemesis. She does endorse hemoptysis over recent days.    MEDICATIONS  (STANDING):  atorvastatin 40 milliGRAM(s) Oral at bedtime  pentoxifylline 400 milliGRAM(s) Oral three times a day  cefTRIAXone   IVPB      azithromycin  IVPB      cefTRIAXone   IVPB 1 Gram(s) IV Intermittent every 24 hours  azithromycin  IVPB 500 milliGRAM(s) IV Intermittent every 24 hours  influenza   Vaccine 0.5 milliLiter(s) IntraMuscular once  potassium phosphate IVPB 15 milliMole(s) IV Intermittent once  pantoprazole  Injectable 40 milliGRAM(s) IV Push daily    MEDICATIONS  (PRN):  ALBUTerol/ipratropium for Nebulization 3 milliLiter(s) Nebulizer every 6 hours PRN Shortness of Breath and/or Wheezing        CAPILLARY BLOOD GLUCOSE        I&O's Summary      ROS:  General: no fevers chills  Neuro: No headache  MSK: No back pain  Cardiac: No chest pain, palpitations  Pulmonary: SOB  GI: As per HPI  : No dysuria or hesitancy  Skin: No rash or pruritis      PHYSICAL EXAM:  GENERAL: NAD, well-developed  HEAD:  Atraumatic, Normocephalic  EYES: EOMI, PERRLA, conjunctiva and sclera anicteric  NECK: Supple, No JVD  CHEST/LUNG: RLL crackles.  HEART: Regular rate and rhythm; No murmurs, rubs, or gallops  ABDOMEN: Soft, Nontender, Nondistended; Bowel sounds present, no hepatosplenomegaly, no rebound or guarding  EXTREMITIES:  2+ Peripheral Pulses, No clubbing, cyanosis, or edema  PSYCH: AAOx3  NEUROLOGY: non-focal, no asterixis  SKIN: No rashes or lesions, no palmar erythema or Dupuytren's contracture, no gynecomastia    LABS:                        9.0    8.45  )-----------( 210      ( 13 Sep 2017 05:07 )             27.0     09-13    146<H>  |  112<H>  |  25<H>  ----------------------------<  84  3.6   |  20<L>  |  1.22    Ca    8.7      13 Sep 2017 05:07  Phos  2.4     09-13  Mg     1.8     09-13    TPro  5.7<L>  /  Alb  3.4  /  TBili  1.1  /  DBili  x   /  AST  23  /  ALT  30  /  AlkPhos  46  09-12    LIVER FUNCTIONS - ( 12 Sep 2017 14:30 )  Alb: 3.4 g/dL / Pro: 5.7 g/dL / ALK PHOS: 46 u/L / ALT: 30 u/L / AST: 23 u/L / GGT: x           PT/INR - ( 13 Sep 2017 05:07 )   PT: 20.2 SEC;   INR: 1.78          PTT - ( 12 Sep 2017 10:15 )  PTT:57.5 SEC          RADIOLOGY & ADDITIONAL TESTS:    < from: CT Abdomen and Pelvis No Cont (09.12.17 @ 13:39) >    EXAM:  CT ABDOMEN AND PELVIS      EXAM:  CT CHEST        PROCEDURE DATE:  Sep 12 2017         INTERPRETATION:  CLINICAL INFORMATION: Smoker with prior cancer.   Hemoptysis.    COMPARISON: CTA abdomen and pelvis 7/9/2013.    PROCEDURE:   CT of the Chest, Abdomen and Pelvis was performed without intravenous   contrast.   Intravenous contrast: None.  Oral contrast: None.  Sagittal and coronal reformats were performed.    Evaluation of parenchymal organs and vascular structures is limited   withoutintravenous contrast.    FINDINGS:    CHEST:     LUNGS AND LARGE AIRWAYS: Patent central airways. Central groundglass   opacity in the right upper lobe.  PLEURA: Small right and trace left pleural effusions.  VESSELS: Within normal limits.  HEART: Cardiomegaly. Pacemaker lead in right ventricle. Coronary arterial   calcifications. No pericardial effusion.  MEDIASTINUM AND MACY: Subcentimeter mediastinal lymph nodes.  CHEST WALL AND LOWER NECK: Within normal limits.    ABDOMEN AND PELVIS:    LIVER: Within normal limits.  BILE DUCTS: Cholelithiasis.  GALLBLADDER: Within normal limits.  SPLEEN: Within normal limits.  PANCREAS: Within normal limits.  ADRENALS: 1.4 cm right adrenal nodule. Unremarkable left adrenal gland.  KIDNEYS/URETERS: 2 mm nonobstructing calculus in the upper pole left   kidney.    BLADDER: Within normal limits.  REPRODUCTIVE ORGANS: Hysterectomy.    BOWEL: No bowel obstruction. Unchanged surgical anastomosis at the   descending colon. Small hiatal hernia.   PERITONEUM: No ascites.  VESSELS: Two femoral-femoral bypass graft again noted. Extensive   atherosclerotic calcifications.   RETROPERITONEUM: No lymphadenopathy.    ABDOMINAL WALL: Within normal limits.  BONES: Degenerative changes.    IMPRESSION: Right upper lobe groundglass opacity which may represent   pulmonary hemorrhage.  Small right pleural effusion.                    BRIDGER TREJO M.D., ATTENDING RADIOLOGIST  This document has been electronically signed. Sep 12 2017  2:01PM                  < end of copied text >

## 2017-09-13 NOTE — PROGRESS NOTE ADULT - PROBLEM SELECTOR PLAN 4
EP consulted in ED to evaluate PPM. PPM showed +RV lead under-sensing with over pacing and excellent threshold capture. PPM reprogrammed to avoid under sensing.

## 2017-09-13 NOTE — PROGRESS NOTE ADULT - PROBLEM SELECTOR PLAN 6
Atrial Fibrillation on Coumadin. Holding coumadin for now given recent episode of hemoptysis likely 2/2 to pulmonary hemorrhage.   Will cont to monitor.

## 2017-09-13 NOTE — PROGRESS NOTE ADULT - PROBLEM SELECTOR PLAN 2
CT Chest showing Right upper lobe ground-glass opacity which may represent   pulmonary hemorrhage. DDx: Lung malignancy given pt extensive smoking hx vs. bronchitis.  - Pulm recs:  Reverse INR, continuos pulse oximetry/ Cont w/ ABX for presumed PNA at this time. Unable to r/o PNA in setting of GGO and hemorrhage   - Trend CBC, PT/INR  - Transfuse Hb < 7

## 2017-09-14 LAB
BUN SERPL-MCNC: 23 MG/DL — SIGNIFICANT CHANGE UP (ref 7–23)
CALCIUM SERPL-MCNC: 8.7 MG/DL — SIGNIFICANT CHANGE UP (ref 8.4–10.5)
CHLORIDE SERPL-SCNC: 113 MMOL/L — HIGH (ref 98–107)
CO2 SERPL-SCNC: 19 MMOL/L — LOW (ref 22–31)
CREAT SERPL-MCNC: 1.34 MG/DL — HIGH (ref 0.5–1.3)
GLUCOSE SERPL-MCNC: 95 MG/DL — SIGNIFICANT CHANGE UP (ref 70–99)
HCT VFR BLD CALC: 27.6 % — LOW (ref 34.5–45)
HGB BLD-MCNC: 8.7 G/DL — LOW (ref 11.5–15.5)
INR BLD: 1.23 — HIGH (ref 0.88–1.17)
MAGNESIUM SERPL-MCNC: 1.9 MG/DL — SIGNIFICANT CHANGE UP (ref 1.6–2.6)
MCHC RBC-ENTMCNC: 29.6 PG — SIGNIFICANT CHANGE UP (ref 27–34)
MCHC RBC-ENTMCNC: 31.5 % — LOW (ref 32–36)
MCV RBC AUTO: 93.9 FL — SIGNIFICANT CHANGE UP (ref 80–100)
NRBC # FLD: 0 — SIGNIFICANT CHANGE UP
PHOSPHATE SERPL-MCNC: 3.3 MG/DL — SIGNIFICANT CHANGE UP (ref 2.5–4.5)
PLATELET # BLD AUTO: 233 K/UL — SIGNIFICANT CHANGE UP (ref 150–400)
PMV BLD: 11.1 FL — SIGNIFICANT CHANGE UP (ref 7–13)
POTASSIUM SERPL-MCNC: 4.7 MMOL/L — SIGNIFICANT CHANGE UP (ref 3.5–5.3)
POTASSIUM SERPL-SCNC: 4.7 MMOL/L — SIGNIFICANT CHANGE UP (ref 3.5–5.3)
PROTHROM AB SERPL-ACNC: 13.8 SEC — HIGH (ref 9.8–13.1)
RBC # BLD: 2.94 M/UL — LOW (ref 3.8–5.2)
RBC # FLD: 16.5 % — HIGH (ref 10.3–14.5)
SODIUM SERPL-SCNC: 144 MMOL/L — SIGNIFICANT CHANGE UP (ref 135–145)
WBC # BLD: 8.02 K/UL — SIGNIFICANT CHANGE UP (ref 3.8–10.5)
WBC # FLD AUTO: 8.02 K/UL — SIGNIFICANT CHANGE UP (ref 3.8–10.5)

## 2017-09-14 PROCEDURE — 99232 SBSQ HOSP IP/OBS MODERATE 35: CPT | Mod: GC

## 2017-09-14 PROCEDURE — 99233 SBSQ HOSP IP/OBS HIGH 50: CPT | Mod: GC

## 2017-09-14 RX ORDER — SENNA PLUS 8.6 MG/1
2 TABLET ORAL AT BEDTIME
Qty: 0 | Refills: 0 | Status: DISCONTINUED | OUTPATIENT
Start: 2017-09-14 | End: 2017-09-18

## 2017-09-14 RX ORDER — DOCUSATE SODIUM 100 MG
100 CAPSULE ORAL THREE TIMES A DAY
Qty: 0 | Refills: 0 | Status: DISCONTINUED | OUTPATIENT
Start: 2017-09-14 | End: 2017-09-18

## 2017-09-14 RX ORDER — ONDANSETRON 8 MG/1
4 TABLET, FILM COATED ORAL ONCE
Qty: 0 | Refills: 0 | Status: COMPLETED | OUTPATIENT
Start: 2017-09-14 | End: 2017-09-14

## 2017-09-14 RX ORDER — SIMETHICONE 80 MG/1
80 TABLET, CHEWABLE ORAL
Qty: 0 | Refills: 0 | Status: DISCONTINUED | OUTPATIENT
Start: 2017-09-14 | End: 2017-09-18

## 2017-09-14 RX ADMIN — Medication 100 MILLIGRAM(S): at 11:43

## 2017-09-14 RX ADMIN — Medication 100 MILLIGRAM(S): at 22:01

## 2017-09-14 RX ADMIN — AZITHROMYCIN 250 MILLIGRAM(S): 500 TABLET, FILM COATED ORAL at 18:14

## 2017-09-14 RX ADMIN — Medication 100 MILLIGRAM(S): at 17:30

## 2017-09-14 RX ADMIN — PANTOPRAZOLE SODIUM 40 MILLIGRAM(S): 20 TABLET, DELAYED RELEASE ORAL at 06:17

## 2017-09-14 RX ADMIN — CEFTRIAXONE 100 GRAM(S): 500 INJECTION, POWDER, FOR SOLUTION INTRAMUSCULAR; INTRAVENOUS at 17:30

## 2017-09-14 RX ADMIN — SIMETHICONE 80 MILLIGRAM(S): 80 TABLET, CHEWABLE ORAL at 01:12

## 2017-09-14 RX ADMIN — Medication 100 MILLIGRAM(S): at 14:21

## 2017-09-14 RX ADMIN — Medication 400 MILLIGRAM(S): at 06:17

## 2017-09-14 RX ADMIN — ONDANSETRON 4 MILLIGRAM(S): 8 TABLET, FILM COATED ORAL at 01:12

## 2017-09-14 RX ADMIN — Medication 400 MILLIGRAM(S): at 22:01

## 2017-09-14 RX ADMIN — ATORVASTATIN CALCIUM 40 MILLIGRAM(S): 80 TABLET, FILM COATED ORAL at 22:01

## 2017-09-14 RX ADMIN — Medication 100 MILLIGRAM(S): at 06:17

## 2017-09-14 RX ADMIN — Medication 100 MILLIGRAM(S): at 00:32

## 2017-09-14 RX ADMIN — Medication 400 MILLIGRAM(S): at 14:21

## 2017-09-14 RX ADMIN — Medication 100 MILLIGRAM(S): at 06:19

## 2017-09-14 RX ADMIN — SENNA PLUS 2 TABLET(S): 8.6 TABLET ORAL at 22:01

## 2017-09-14 NOTE — PROGRESS NOTE ADULT - PROBLEM SELECTOR PLAN 1
Elevated INR 6.7 on admission. S/p 5 units vit K in ED  DDx: Diet changes, medication non adherence, recent Cefuroxime at PMD for presumed bronchitis  - Holding coumadin- repeat Hb 9/INR 1.78 (9/13/17)  - Pt s/p Vitamin K and FFP  - Goal is to optimize CP status prior to discharge as pt is unwilling to receive any more treatement/invasive procedures. GI recommending non emergent oupt EGD as outpt.

## 2017-09-14 NOTE — PROGRESS NOTE ADULT - SUBJECTIVE AND OBJECTIVE BOX
Chief complaint: Patient is a 89y old Female who was seen for follow up for elevated INR (12 Sep 2017 17:14)      SUBJECTIVE / OVERNIGHT EVENTS: No acute events over night. Pt reports feeling well. No active complaints. No episodes of hemoptysis. Denies any fevers, chills, n/v, cp, sob, abd pain, hematuria, hematochezia, or melena.    MEDICATIONS  (STANDING):  atorvastatin 40 milliGRAM(s) Oral at bedtime  pentoxifylline 400 milliGRAM(s) Oral three times a day  cefTRIAXone   IVPB      azithromycin  IVPB      cefTRIAXone   IVPB 1 Gram(s) IV Intermittent every 24 hours  azithromycin  IVPB 500 milliGRAM(s) IV Intermittent every 24 hours  influenza   Vaccine 0.5 milliLiter(s) IntraMuscular once    MEDICATIONS  (PRN):  ALBUTerol/ipratropium for Nebulization 3 milliLiter(s) Nebulizer every 6 hours PRN Shortness of Breath and/or Wheezing      T(C): 37.1 (09-13-17 @ 05:13), Max: 37.3 (09-13-17 @ 00:55)  HR: 81 (09-13-17 @ 05:13) (81 - 101)  BP: 133/73 (09-13-17 @ 05:13) (122/64 - 143/67)  RR: 17 (09-13-17 @ 05:13) (17 - 20)  SpO2: 96% (09-13-17 @ 05:13) (96% - 100%)    PHYSICAL EXAM:  GENERAL: NAD, well-developed  HEAD:  Atraumatic, Normocephalic  EYES: EOMI, PERRLA, conjunctiva and sclera clear  NECK: Supple, No JVD  CHEST/LUNG: Clear to auscultation bilaterally; No wheeze  HEART: Regular rate and rhythm; No murmurs, rubs, or gallops  ABDOMEN: Soft, Nontender, Nondistended; Bowel sounds present  EXTREMITIES:  2+ Peripheral Pulses, No clubbing, cyanosis, or edema  PSYCH: AAOx3  NEUROLOGY: non-focal  SKIN: No rashes or lesions    LABS:                        9.0    8.45  )-----------( 210      ( 13 Sep 2017 05:07 )             27.0     WBC Trend: 8.45<--, 7.65<--, 7.62<--  09-12    144  |  111<H>  |  26<H>  ----------------------------<  109<H>  3.7   |  19<L>  |  1.24    Ca    8.6      12 Sep 2017 14:30  Phos  2.8     09-12  Mg     1.8     09-12    TPro  5.7<L>  /  Alb  3.4  /  TBili  1.1  /  DBili  x   /  AST  23  /  ALT  30  /  AlkPhos  46  09-12    Creatinine Trend: 1.24<--  PT/INR - ( 13 Sep 2017 05:07 )   PT: 20.2 SEC;   INR: 1.78          PTT - ( 12 Sep 2017 10:15 )  PTT:57.5 SEC            RADIOLOGY & ADDITIONAL TESTS:    Imaging Personally Reviewed:    Consultant(s) Notes Reviewed:      Care Discussed with Consultants/Other Providers:

## 2017-09-14 NOTE — PROGRESS NOTE ADULT - SUBJECTIVE AND OBJECTIVE BOX
Patient is a 89y old  Female who presents with a chief complaint of Elevated INR (12 Sep 2017 17:14)      SUBJECTIVE / OVERNIGHT EVENTS: No bowel movement for several days, feels uncomfortable. Also with shortness of breath and hemoptysis.    MEDICATIONS  (STANDING):  atorvastatin 40 milliGRAM(s) Oral at bedtime  pentoxifylline 400 milliGRAM(s) Oral three times a day  influenza   Vaccine 0.5 milliLiter(s) IntraMuscular once  azithromycin  IVPB 500 milliGRAM(s) IV Intermittent every 24 hours  cefTRIAXone   IVPB 1 Gram(s) IV Intermittent every 24 hours  pantoprazole    Tablet 40 milliGRAM(s) Oral before breakfast  benzonatate 100 milliGRAM(s) Oral every 6 hours  docusate sodium 100 milliGRAM(s) Oral three times a day  senna 2 Tablet(s) Oral at bedtime    MEDICATIONS  (PRN):  ALBUTerol/ipratropium for Nebulization 3 milliLiter(s) Nebulizer every 6 hours PRN Shortness of Breath and/or Wheezing  simethicone 80 milliGRAM(s) Chew every 2 hours PRN Gas        CAPILLARY BLOOD GLUCOSE        I&O's Summary      ROS:  General: no fevers chills  Neuro: No headache  MSK: No back pain  Cardiac: No chest pain, palpitations  Pulmonary: No SOB or cough  GI: As per HPI  : No dysuria or hesitancy  Skin: No rash or pruritis      PHYSICAL EXAM:  GENERAL: NAD, well-developed  HEAD:  Atraumatic, Normocephalic  EYES: EOMI, PERRLA, conjunctiva and sclera anicteric  NECK: Supple, No JVD  CHEST/LUNG: Bibasilar crackles.  HEART: Regular rate and rhythm; No murmurs, rubs, or gallops  ABDOMEN: Soft, Nontender, Nondistended; Bowel sounds present, no hepatosplenomegaly, no rebound or guarding  EXTREMITIES:  2+ Peripheral Pulses, No clubbing, cyanosis, or edema  PSYCH: AAOx3  NEUROLOGY: non-focal, no asterixis  SKIN: No rashes or lesions, no palmar erythema or Dupuytren's contracture, no gynecomastia    LABS:                        8.7    8.02  )-----------( 233      ( 14 Sep 2017 05:59 )             27.6     09-14    144  |  113<H>  |  23  ----------------------------<  95  4.7   |  19<L>  |  1.34<H>    Ca    8.7      14 Sep 2017 05:59  Phos  3.3     09-14  Mg     1.9     09-14        PT/INR - ( 14 Sep 2017 05:59 )   PT: 13.8 SEC;   INR: 1.23                    RADIOLOGY & ADDITIONAL TESTS:    < from: CT Chest No Cont (09.12.17 @ 13:39) >    EXAM:  CT ABDOMEN AND PELVIS      EXAM:  CT CHEST        PROCEDURE DATE:  Sep 12 2017         INTERPRETATION:  CLINICAL INFORMATION: Smoker with prior cancer.   Hemoptysis.    COMPARISON: CTA abdomen and pelvis 7/9/2013.    PROCEDURE:   CT of the Chest, Abdomen and Pelvis was performed without intravenous   contrast.   Intravenous contrast: None.  Oral contrast: None.  Sagittal and coronal reformats were performed.    Evaluation of parenchymal organs and vascular structures is limited   withoutintravenous contrast.    FINDINGS:    CHEST:     LUNGS AND LARGE AIRWAYS: Patent central airways. Central groundglass   opacity in the right upper lobe.  PLEURA: Small right and trace left pleural effusions.  VESSELS: Within normal limits.  HEART: Cardiomegaly. Pacemaker lead in right ventricle. Coronary arterial   calcifications. No pericardial effusion.  MEDIASTINUM AND MACY: Subcentimeter mediastinal lymph nodes.  CHEST WALL AND LOWER NECK: Within normal limits.    ABDOMEN AND PELVIS:    LIVER: Within normal limits.  BILE DUCTS: Cholelithiasis.  GALLBLADDER: Within normal limits.  SPLEEN: Within normal limits.  PANCREAS: Within normal limits.  ADRENALS: 1.4 cm right adrenal nodule. Unremarkable left adrenal gland.  KIDNEYS/URETERS: 2 mm nonobstructing calculus in the upper pole left   kidney.    BLADDER: Within normal limits.  REPRODUCTIVE ORGANS: Hysterectomy.    BOWEL: No bowel obstruction. Unchanged surgical anastomosis at the   descending colon. Small hiatal hernia.   PERITONEUM: No ascites.  VESSELS: Two femoral-femoral bypass graft again noted. Extensive   atherosclerotic calcifications.   RETROPERITONEUM: No lymphadenopathy.    ABDOMINAL WALL: Within normal limits.  BONES: Degenerative changes.    IMPRESSION: Right upper lobe groundglass opacity which may represent   pulmonary hemorrhage.  Small right pleural effusion.                    BRIDGER TREJO M.D., ATTENDING RADIOLOGIST  This document has been electronically signed. Sep 12 2017  2:01PM                  < end of copied text >

## 2017-09-14 NOTE — PROGRESS NOTE ADULT - PROBLEM SELECTOR PLAN 6
Atrial Fibrillation on Coumadin. Holding coumadin for now given recent episode of hemoptysis likely 2/2 to pulmonary hemorrhage.   Dr. Currie (outpt PCP) aware of pt hospitalization- recommends d/c'ing on coumadin and he will start pt on ASA on outpt basis as she could not afford Xarelto previously.

## 2017-09-14 NOTE — PROGRESS NOTE ADULT - ATTENDING COMMENTS
Pt seen and examined, chart and labs reviewed.  Pt still with jose hemoptysis and cough; pt still declining to have aggressive intervention done including bronchoscopy or EGD/colonoscopy.  Pt is in agreement for further monitoring of hemoptysis and IV antibiotic therapy.  Agree with resident note as above.

## 2017-09-14 NOTE — PROGRESS NOTE ADULT - PROBLEM SELECTOR PLAN 5
Home meds Norvasc 10mg QD and Losartan 50mg qd  Will hold home BP meds for now given recent episode of hemoptysis and drop in Hb.  - Pt currently hemodynamically stable, will cont to monitor.

## 2017-09-14 NOTE — PROGRESS NOTE ADULT - PROBLEM SELECTOR PLAN 2
CT Chest showing Right upper lobe ground-glass opacity which may represent   pulmonary hemorrhage. DDx: Lung malignancy given pt extensive smoking hx vs. bronchitis.  - Trend CBC, PT/INR  - Transfuse Hb < 7

## 2017-09-14 NOTE — PROGRESS NOTE ADULT - ASSESSMENT
Impression:    1. Anemia: unclear if due to prior GI bleeding. No current GI bleeding but with hemoptysis. Patient could have peptic ulcer disease, esophagogastric or colorectal cancer.    2. Hemoptysis d/t PNA vs. Lung cancer.    3. Atrial fibrillation on warfarin.    Recommendation:    -patient declines colonoscopy. She may be amenable to upper endoscopy however she is not stable from a pulmonary perspective given coughing/SOB.  -continue pantoprazole 40mg daily  -would prefer miralax for relief of constipation Impression:    1. Anemia: unclear if due to prior GI bleeding. No current GI bleeding but with hemoptysis. Patient could have peptic ulcer disease, esophagogastric or colorectal cancer.    2. Hemoptysis d/t PNA vs. Lung cancer.    3. Atrial fibrillation on warfarin.    Recommendation:    -patient declines colonoscopy. She now adamantly refuses upper endoscopy as well despite informing her of possiblity that she could have a life threatening gastrointestinal process.  -continue pantoprazole 40mg daily  -would prefer miralax for relief of constipation  -would suggest checking reticulocyte count and iron studies for anemia workpu

## 2017-09-15 LAB
BUN SERPL-MCNC: 23 MG/DL — SIGNIFICANT CHANGE UP (ref 7–23)
CALCIUM SERPL-MCNC: 8.8 MG/DL — SIGNIFICANT CHANGE UP (ref 8.4–10.5)
CHLORIDE SERPL-SCNC: 111 MMOL/L — HIGH (ref 98–107)
CO2 SERPL-SCNC: 20 MMOL/L — LOW (ref 22–31)
CREAT SERPL-MCNC: 1.45 MG/DL — HIGH (ref 0.5–1.3)
FERRITIN SERPL-MCNC: 95.03 NG/ML — SIGNIFICANT CHANGE UP (ref 15–150)
GLUCOSE SERPL-MCNC: 95 MG/DL — SIGNIFICANT CHANGE UP (ref 70–99)
HCT VFR BLD CALC: 26.6 % — LOW (ref 34.5–45)
HGB BLD-MCNC: 8.5 G/DL — LOW (ref 11.5–15.5)
IRON SATN MFR SERPL: 230 UG/DL — SIGNIFICANT CHANGE UP (ref 140–530)
IRON SATN MFR SERPL: 41 UG/DL — SIGNIFICANT CHANGE UP (ref 30–160)
MAGNESIUM SERPL-MCNC: 1.9 MG/DL — SIGNIFICANT CHANGE UP (ref 1.6–2.6)
MCHC RBC-ENTMCNC: 29.6 PG — SIGNIFICANT CHANGE UP (ref 27–34)
MCHC RBC-ENTMCNC: 32 % — SIGNIFICANT CHANGE UP (ref 32–36)
MCV RBC AUTO: 92.7 FL — SIGNIFICANT CHANGE UP (ref 80–100)
NRBC # FLD: 0 — SIGNIFICANT CHANGE UP
PHOSPHATE SERPL-MCNC: 3.4 MG/DL — SIGNIFICANT CHANGE UP (ref 2.5–4.5)
PLATELET # BLD AUTO: 276 K/UL — SIGNIFICANT CHANGE UP (ref 150–400)
PMV BLD: 11 FL — SIGNIFICANT CHANGE UP (ref 7–13)
POTASSIUM SERPL-MCNC: 4.9 MMOL/L — SIGNIFICANT CHANGE UP (ref 3.5–5.3)
POTASSIUM SERPL-SCNC: 4.9 MMOL/L — SIGNIFICANT CHANGE UP (ref 3.5–5.3)
RBC # BLD: 2.87 M/UL — LOW (ref 3.8–5.2)
RBC # FLD: 16.6 % — HIGH (ref 10.3–14.5)
RETICS #: 0.1 10X6/UL — HIGH (ref 0.02–0.07)
RETICS/RBC NFR: 3.9 % — HIGH (ref 0.5–2.5)
SODIUM SERPL-SCNC: 143 MMOL/L — SIGNIFICANT CHANGE UP (ref 135–145)
UIBC SERPL-MCNC: 189 UG/DL — SIGNIFICANT CHANGE UP (ref 110–370)
WBC # BLD: 8.62 K/UL — SIGNIFICANT CHANGE UP (ref 3.8–10.5)
WBC # FLD AUTO: 8.62 K/UL — SIGNIFICANT CHANGE UP (ref 3.8–10.5)

## 2017-09-15 PROCEDURE — 99233 SBSQ HOSP IP/OBS HIGH 50: CPT | Mod: GC

## 2017-09-15 PROCEDURE — 99232 SBSQ HOSP IP/OBS MODERATE 35: CPT | Mod: GC

## 2017-09-15 RX ADMIN — Medication 100 MILLIGRAM(S): at 06:29

## 2017-09-15 RX ADMIN — SENNA PLUS 2 TABLET(S): 8.6 TABLET ORAL at 21:54

## 2017-09-15 RX ADMIN — AZITHROMYCIN 250 MILLIGRAM(S): 500 TABLET, FILM COATED ORAL at 18:05

## 2017-09-15 RX ADMIN — Medication 400 MILLIGRAM(S): at 21:53

## 2017-09-15 RX ADMIN — CEFTRIAXONE 100 GRAM(S): 500 INJECTION, POWDER, FOR SOLUTION INTRAMUSCULAR; INTRAVENOUS at 17:33

## 2017-09-15 RX ADMIN — ATORVASTATIN CALCIUM 40 MILLIGRAM(S): 80 TABLET, FILM COATED ORAL at 21:54

## 2017-09-15 RX ADMIN — SIMETHICONE 80 MILLIGRAM(S): 80 TABLET, CHEWABLE ORAL at 21:53

## 2017-09-15 RX ADMIN — Medication 100 MILLIGRAM(S): at 21:53

## 2017-09-15 RX ADMIN — Medication 100 MILLIGRAM(S): at 14:34

## 2017-09-15 RX ADMIN — Medication 100 MILLIGRAM(S): at 17:34

## 2017-09-15 RX ADMIN — Medication 400 MILLIGRAM(S): at 14:34

## 2017-09-15 RX ADMIN — Medication 400 MILLIGRAM(S): at 06:29

## 2017-09-15 RX ADMIN — PANTOPRAZOLE SODIUM 40 MILLIGRAM(S): 20 TABLET, DELAYED RELEASE ORAL at 06:30

## 2017-09-15 RX ADMIN — Medication 100 MILLIGRAM(S): at 12:37

## 2017-09-15 NOTE — PROGRESS NOTE ADULT - PROBLEM SELECTOR PLAN 6
Atrial Fibrillation on Coumadin. Holding coumadin for now given recent episode of hemoptysis likely 2/2 to pulmonary hemorrhage.   Dr. Currie (outpt PCP) aware of pt hospitalization- recommends d/c'ing on ASA on outpt basis as she could not afford Xarelto previously.

## 2017-09-15 NOTE — PROGRESS NOTE ADULT - PROBLEM SELECTOR PLAN 1
Elevated INR 6.7 on admission. S/p 5 units vit K and FFP  DDx: Diet changes, medication non adherence, recent Cefuroxime at PMD for presumed bronchitis  - Holding coumadin  - Goal is to optimize CP status prior to discharge as pt is unwilling to receive any more treatement/invasive procedures. GI recommending non emergent oupt EGD Elevated INR 6.7 on admission. S/p 5 units vit K and FFP  DDx: Diet changes, medication non adherence, recent Cefuroxime at PMD for presumed bronchitis  - Holding coumadin  - GI recommending non emergent oupt EGD

## 2017-09-15 NOTE — PROGRESS NOTE ADULT - ASSESSMENT
Impression:    1. Anemia: unclear if due to prior GI bleeding. No current GI bleeding but with hemoptysis. Patient could have peptic ulcer disease, esophagogastric or colorectal cancer.    2. Hemoptysis d/t PNA vs. Lung cancer.    3. Atrial fibrillation on warfarin.    Recommendation:    -patient continues to adamantly refuse any endoscopic evaluation  -continue pantoprazole 40mg daily  -would prefer miralax for relief of constipation  -would suggest checking reticulocyte count and iron studies for anemia workup

## 2017-09-15 NOTE — PROGRESS NOTE ADULT - PROBLEM SELECTOR PLAN 3
Given findings of pt CT Chest in setting of hemoptysis, recent episode is likely from pulmonary source however cannot r/o GI causes w/ Positive FOBT in ED. DDx: PUD, malignancy, angiodysplasia/ectasia, varices  - GI recs: Protonix 40mg QD, Pt aware of possibility of GI bleed+ risks/benefits of intervention however, she would not like any invasive procedure at this time  - Transfuse Hb <7 Given findings of pt CT Chest in setting of hemoptysis, recent episode is likely from pulmonary source however cannot r/o GI causes w/ Positive FOBT in ED. DDx: PUD, malignancy, angiodysplasia/ectasia, varices  - GI recs: Protonix 40mg QD, Pt aware of possibility of GI bleed+ risks/benefits of intervention however, she would not like any invasive procedure at this time  - Transfuse Hb <7  - Retic Index 1.65 indicative of hypoproliferation possibly due to nutritional deficiencies/marrow failure

## 2017-09-15 NOTE — PROGRESS NOTE ADULT - ATTENDING COMMENTS
Pt seen and examined, chart and labs reviewed.  Pt's hemoptysis improved, no episodes overnight.  Pt overall feels improved, however still very weak and unsteady on feet.   Had extensive discussion with patient and again explained the risks/benefits of EGD and/or bronchoscopy for further investigation.  She again has declined and wishes to follow up as outpatient.      Plan to continue IV abx through Monday and d/c home Monday AM.

## 2017-09-15 NOTE — PROGRESS NOTE ADULT - SUBJECTIVE AND OBJECTIVE BOX
Patient is a 89y old  Female who presents with a chief complaint of Elevated INR (12 Sep 2017 17:14)      SUBJECTIVE / OVERNIGHT EVENTS: No bowel movements overnight.     MEDICATIONS  (STANDING):  atorvastatin 40 milliGRAM(s) Oral at bedtime  pentoxifylline 400 milliGRAM(s) Oral three times a day  influenza   Vaccine 0.5 milliLiter(s) IntraMuscular once  azithromycin  IVPB 500 milliGRAM(s) IV Intermittent every 24 hours  cefTRIAXone   IVPB 1 Gram(s) IV Intermittent every 24 hours  pantoprazole    Tablet 40 milliGRAM(s) Oral before breakfast  benzonatate 100 milliGRAM(s) Oral every 6 hours  docusate sodium 100 milliGRAM(s) Oral three times a day  senna 2 Tablet(s) Oral at bedtime    MEDICATIONS  (PRN):  ALBUTerol/ipratropium for Nebulization 3 milliLiter(s) Nebulizer every 6 hours PRN Shortness of Breath and/or Wheezing  simethicone 80 milliGRAM(s) Chew every 2 hours PRN Gas        CAPILLARY BLOOD GLUCOSE        I&O's Summary      ROS:  General: no fevers chills  Neuro: No headache  MSK: No back pain  Cardiac: No chest pain, palpitations  Pulmonary: No SOB or cough  GI: As per HPI  : No dysuria or hesitancy  Skin: No rash or pruritis      PHYSICAL EXAM:  GENERAL: NAD, well-developed  HEAD:  Atraumatic, Normocephalic  EYES: EOMI, PERRLA, conjunctiva and sclera anicteric  NECK: Supple, No JVD  CHEST/LUNG: Clear to auscultation bilaterally; No wheeze  HEART: Regular rate and rhythm; No murmurs, rubs, or gallops  ABDOMEN: Soft, Nontender, Nondistended; Bowel sounds present, no hepatosplenomegaly, no rebound or guarding  EXTREMITIES:  2+ Peripheral Pulses, No clubbing, cyanosis, or edema  PSYCH: AAOx3  NEUROLOGY: non-focal, no asterixis  SKIN: No rashes or lesions, no palmar erythema or Dupuytren's contracture, no gynecomastia    LABS:                        8.5    8.62  )-----------( 276      ( 15 Sep 2017 05:19 )             26.6     09-15    143  |  111<H>  |  23  ----------------------------<  95  4.9   |  20<L>  |  1.45<H>    Ca    8.8      15 Sep 2017 05:19  Phos  3.4     09-15  Mg     1.9     09-15        PT/INR - ( 14 Sep 2017 05:59 )   PT: 13.8 SEC;   INR: 1.23

## 2017-09-16 DIAGNOSIS — N17.9 ACUTE KIDNEY FAILURE, UNSPECIFIED: ICD-10-CM

## 2017-09-16 PROCEDURE — 99233 SBSQ HOSP IP/OBS HIGH 50: CPT | Mod: GC

## 2017-09-16 RX ORDER — POLYETHYLENE GLYCOL 3350 17 G/17G
17 POWDER, FOR SOLUTION ORAL ONCE
Qty: 0 | Refills: 0 | Status: COMPLETED | OUTPATIENT
Start: 2017-09-16 | End: 2017-09-16

## 2017-09-16 RX ORDER — SODIUM CHLORIDE 9 MG/ML
1000 INJECTION INTRAMUSCULAR; INTRAVENOUS; SUBCUTANEOUS
Qty: 0 | Refills: 0 | Status: DISCONTINUED | OUTPATIENT
Start: 2017-09-16 | End: 2017-09-17

## 2017-09-16 RX ADMIN — Medication 400 MILLIGRAM(S): at 05:56

## 2017-09-16 RX ADMIN — Medication 400 MILLIGRAM(S): at 13:43

## 2017-09-16 RX ADMIN — ATORVASTATIN CALCIUM 40 MILLIGRAM(S): 80 TABLET, FILM COATED ORAL at 22:24

## 2017-09-16 RX ADMIN — Medication 400 MILLIGRAM(S): at 22:24

## 2017-09-16 RX ADMIN — Medication 100 MILLIGRAM(S): at 05:56

## 2017-09-16 RX ADMIN — Medication 100 MILLIGRAM(S): at 17:42

## 2017-09-16 RX ADMIN — Medication 100 MILLIGRAM(S): at 11:37

## 2017-09-16 RX ADMIN — AZITHROMYCIN 250 MILLIGRAM(S): 500 TABLET, FILM COATED ORAL at 17:42

## 2017-09-16 RX ADMIN — Medication 100 MILLIGRAM(S): at 22:26

## 2017-09-16 RX ADMIN — PANTOPRAZOLE SODIUM 40 MILLIGRAM(S): 20 TABLET, DELAYED RELEASE ORAL at 05:57

## 2017-09-16 RX ADMIN — SENNA PLUS 2 TABLET(S): 8.6 TABLET ORAL at 22:24

## 2017-09-16 RX ADMIN — POLYETHYLENE GLYCOL 3350 17 GRAM(S): 17 POWDER, FOR SOLUTION ORAL at 11:36

## 2017-09-16 RX ADMIN — SODIUM CHLORIDE 75 MILLILITER(S): 9 INJECTION INTRAMUSCULAR; INTRAVENOUS; SUBCUTANEOUS at 13:41

## 2017-09-16 RX ADMIN — CEFTRIAXONE 100 GRAM(S): 500 INJECTION, POWDER, FOR SOLUTION INTRAMUSCULAR; INTRAVENOUS at 17:46

## 2017-09-16 RX ADMIN — Medication 100 MILLIGRAM(S): at 13:43

## 2017-09-16 RX ADMIN — Medication 100 MILLIGRAM(S): at 22:24

## 2017-09-16 NOTE — PROGRESS NOTE ADULT - PROBLEM SELECTOR PLAN 6
Atrial Fibrillation on Coumadin. Holding coumadin for now given recent episode of hemoptysis likely 2/2 to pulmonary hemorrhage.   Dr. Currie (outpt PCP) aware of pt hospitalization- recommends d/c'ing on ASA on outpt basis as she could not afford Xarelto previously. Holding in setting of active bleed   SCD's

## 2017-09-16 NOTE — PROGRESS NOTE ADULT - PROBLEM SELECTOR PLAN 2
CT Chest showing Right upper lobe ground-glass opacity which may represent   pulmonary hemorrhage. DDx: Lung malignancy given pt extensive smoking hx vs. bronchitis.  - Trend CBC, PT/INR  - Transfuse Hb < 7 - positive FOBT on admission; possibly due to PUD vs. malignancy vs. angiodysplasia/ectasia  - cont Protonix 40 daily; patient declining further evaluation via endoscopy Will give 24 hour of IVF and reassess tmw

## 2017-09-16 NOTE — PROGRESS NOTE ADULT - PROBLEM SELECTOR PLAN 1
Elevated INR 6.7 on admission. S/p 5 units vit K and FFP  DDx: Diet changes, medication non adherence, recent Cefuroxime at PMD for presumed bronchitis  - Holding coumadin  - GI recommending non emergent oupt EGD - now improve with suppression of cough; cont Tessalon Pearle   - CT Chest showing RUL ground-glass opacity which may represent   pulmonary hemorrhage in setting of supratherapeutic INR on admission   - possibly secondary to occult lung malignancy given extensive smoking history (100-pack years) vs. bronchitis vs. pneumonia  - INR now within normal range s/p 5 units vit K and FFP  - patient declines further evaluation by bronchoscopy or endoscopy to evaluate for possible upper GI causes  - given risk of continued bleeding on Coumadin, decision made to with hold further AC with Coumadin and discharge on ASA 81, patient and PMD in agreement  - Hb stable, trend daily CBC, transfuse Hb < 7.0

## 2017-09-16 NOTE — PROGRESS NOTE ADULT - PROBLEM SELECTOR PLAN 3
Given findings of pt CT Chest in setting of hemoptysis, recent episode is likely from pulmonary source however cannot r/o GI causes w/ Positive FOBT in ED. DDx: PUD, malignancy, angiodysplasia/ectasia, varices  - GI recs: Protonix 40mg QD, Pt aware of possibility of GI bleed+ risks/benefits of intervention however, she would not like any invasive procedure at this time  - Transfuse Hb <7  - Retic Index 1.65 indicative of hypoproliferation possibly due to nutritional deficiencies/marrow failure EP consulted in ED to evaluate PPM. PPM showed +RV lead under-sensing with over pacing and excellent threshold capture. PPM reprogrammed to avoid under sensing. - positive FOBT on admission; possibly due to PUD vs. malignancy vs. angiodysplasia/ectasia  - cont Protonix 40 daily; patient declining further evaluation via endoscopy

## 2017-09-16 NOTE — PROGRESS NOTE ADULT - PROBLEM SELECTOR PLAN 5
Home meds Norvasc 10mg QD and Losartan 50mg qd  Will hold home BP meds for now given recent episode of hemoptysis and drop in Hb.  - Pt currently hemodynamically stable, will cont to monitor. Atrial Fibrillation on Coumadin. Holding coumadin for now given recent episode of hemoptysis likely 2/2 to pulmonary hemorrhage.   Dr. Currie (outpt PCP) aware of pt hospitalization- recommends d/c'ing on ASA on outpt basis as she could not afford Xarelto previously. - BP within acceptable range  - holding home Norvasc 10mg QD and Losartan 50mg qd

## 2017-09-16 NOTE — PROGRESS NOTE ADULT - PROBLEM SELECTOR PLAN 4
EP consulted in ED to evaluate PPM. PPM showed +RV lead under-sensing with over pacing and excellent threshold capture. PPM reprogrammed to avoid under sensing. - BP within acceptable range  - holding home Norvasc 10mg QD and Losartan 50mg qd

## 2017-09-16 NOTE — PROGRESS NOTE ADULT - ATTENDING COMMENTS
Patient was seen and examined personally by me. I have discussed the plan and reviewed the resident's note and agree with the above physical exam findings including assessment and plan except as indicated below.    Pt's hemoptysis improved, cough also improved.     Plan to continue IV abx through Monday and d/c home Monday AM.  Will give 24 hr of IVF hydration and reassess creatinine in AM for BATSHEVA today. Patient was seen and examined personally by me. I have discussed the plan and reviewed the resident's note and agree with the above physical exam findings including assessment and plan except as indicated below.    Pt's hemoptysis improved, cough also improved. H/H stable    Plan to continue IV abx through Monday and d/c home Monday AM.  Will give 24 hr of IVF hydration and reassess creatinine in AM for BATSHEVA today.

## 2017-09-16 NOTE — PROGRESS NOTE ADULT - SUBJECTIVE AND OBJECTIVE BOX
Patient is a 89y old  Female who presents with a chief complaint of Elevated INR (12 Sep 2017 17:14)      SUBJECTIVE / OVERNIGHT EVENTS:    MEDICATIONS  (STANDING):  atorvastatin 40 milliGRAM(s) Oral at bedtime  pentoxifylline 400 milliGRAM(s) Oral three times a day  influenza   Vaccine 0.5 milliLiter(s) IntraMuscular once  azithromycin  IVPB 500 milliGRAM(s) IV Intermittent every 24 hours  cefTRIAXone   IVPB 1 Gram(s) IV Intermittent every 24 hours  pantoprazole    Tablet 40 milliGRAM(s) Oral before breakfast  benzonatate 100 milliGRAM(s) Oral every 6 hours  docusate sodium 100 milliGRAM(s) Oral three times a day  senna 2 Tablet(s) Oral at bedtime  sodium chloride 0.9%. 1000 milliLiter(s) (75 mL/Hr) IV Continuous <Continuous>    MEDICATIONS  (PRN):  ALBUTerol/ipratropium for Nebulization 3 milliLiter(s) Nebulizer every 6 hours PRN Shortness of Breath and/or Wheezing  simethicone 80 milliGRAM(s) Chew every 2 hours PRN Gas    Vital Signs Last 24 Hrs  T(C): 36.6 (16 Sep 2017 05:31), Max: 36.9 (15 Sep 2017 22:30)  T(F): 97.8 (16 Sep 2017 05:31), Max: 98.5 (15 Sep 2017 22:30)  HR: 96 (16 Sep 2017 05:31) (68 - 99)  BP: 135/70 (16 Sep 2017 05:31) (128/77 - 155/73)  BP(mean): --  RR: 20 (16 Sep 2017 05:31) (18 - 20)  SpO2: 98% (16 Sep 2017 05:31) (97% - 100%)    PHYSICAL EXAM:  GENERAL: NAD, well-developed  HEAD:  Atraumatic, Normocephalic  EYES: EOMI, PERRLA, conjunctiva and sclera clear  NECK: Supple, No JVD  CHEST/LUNG: Clear to auscultation bilaterally; No wheeze  HEART: Regular rate and rhythm; No murmurs, rubs, or gallops  ABDOMEN: Soft, Nontender, Nondistended; Bowel sounds present  EXTREMITIES:  2+ Peripheral Pulses, No clubbing, cyanosis, or edema  PSYCH: AAOx3  NEUROLOGY: non-focal  SKIN: No rashes or lesions    LABS:                        8.5    8.62  )-----------( 276      ( 15 Sep 2017 05:19 )             26.6     09-15    143  |  111<H>  |  23  ----------------------------<  95  4.9   |  20<L>  |  1.45<H>    Ca    8.8      15 Sep 2017 05:19  Phos  3.4     09-15  Mg     1.9     09-15        RADIOLOGY & ADDITIONAL TESTS:    Imaging Personally Reviewed:    Consultant(s) Notes Reviewed:      Care Discussed with Consultants/Other Providers: Patient is a 89y old  Female who presents with a chief complaint of Elevated INR (12 Sep 2017 17:14)      SUBJECTIVE / OVERNIGHT EVENTS:    Cough much improved on Tessalon Pearle, patient reports only scant hemoptysis x1 this morning. No chest pain, SOB, dizziness or lightheadedness.     MEDICATIONS  (STANDING):  atorvastatin 40 milliGRAM(s) Oral at bedtime  pentoxifylline 400 milliGRAM(s) Oral three times a day  influenza   Vaccine 0.5 milliLiter(s) IntraMuscular once  azithromycin  IVPB 500 milliGRAM(s) IV Intermittent every 24 hours  cefTRIAXone   IVPB 1 Gram(s) IV Intermittent every 24 hours  pantoprazole    Tablet 40 milliGRAM(s) Oral before breakfast  benzonatate 100 milliGRAM(s) Oral every 6 hours  docusate sodium 100 milliGRAM(s) Oral three times a day  senna 2 Tablet(s) Oral at bedtime  sodium chloride 0.9%. 1000 milliLiter(s) (75 mL/Hr) IV Continuous <Continuous>    MEDICATIONS  (PRN):  ALBUTerol/ipratropium for Nebulization 3 milliLiter(s) Nebulizer every 6 hours PRN Shortness of Breath and/or Wheezing  simethicone 80 milliGRAM(s) Chew every 2 hours PRN Gas    Vital Signs Last 24 Hrs  T(C): 36.6 (16 Sep 2017 05:31), Max: 36.9 (15 Sep 2017 22:30)  T(F): 97.8 (16 Sep 2017 05:31), Max: 98.5 (15 Sep 2017 22:30)  HR: 96 (16 Sep 2017 05:31) (68 - 99)  BP: 135/70 (16 Sep 2017 05:31) (128/77 - 155/73)  BP(mean): --  RR: 20 (16 Sep 2017 05:31) (18 - 20)  SpO2: 98% (16 Sep 2017 05:31) (97% - 100%)    PHYSICAL EXAM:  GENERAL: NAD, well-developed, well-groomed, pleasant  HEAD:  Atraumatic, Normocephalic  EYES: EOMI, PERRLA, conjunctiva and sclera clear  NECK: Supple, No JVD  CHEST/LUNG: Clear to auscultation bilaterally +breath sounds diminished in RUL  HEART: Regular rate and rhythm; No murmurs, rubs, or gallops  ABDOMEN: Soft, Nontender, Nondistended; Bowel sounds present  EXTREMITIES:  2+ peripheral Pulses, no clubbing, cyanosis, or edema  PSYCH: AAOx3  NEUROLOGY: non-focal  SKIN: No rashes or lesions    LABS:                        8.5    8.62  )-----------( 276      ( 15 Sep 2017 05:19 )             26.6     09-15    143  |  111<H>  |  23  ----------------------------<  95  4.9   |  20<L>  |  1.45<H>    Ca    8.8      15 Sep 2017 05:19  Phos  3.4     09-15  Mg     1.9     09-15        RADIOLOGY & ADDITIONAL TESTS:    Imaging Personally Reviewed:    Consultant(s) Notes Reviewed:  Gasteroenterology    Care Discussed with Consultants/Other Providers:

## 2017-09-17 ENCOUNTER — TRANSCRIPTION ENCOUNTER (OUTPATIENT)
Age: 82
End: 2017-09-17

## 2017-09-17 LAB
BUN SERPL-MCNC: 18 MG/DL — SIGNIFICANT CHANGE UP (ref 7–23)
CALCIUM SERPL-MCNC: 8.9 MG/DL — SIGNIFICANT CHANGE UP (ref 8.4–10.5)
CHLORIDE SERPL-SCNC: 109 MMOL/L — HIGH (ref 98–107)
CO2 SERPL-SCNC: 20 MMOL/L — LOW (ref 22–31)
CREAT SERPL-MCNC: 1.1 MG/DL — SIGNIFICANT CHANGE UP (ref 0.5–1.3)
GLUCOSE SERPL-MCNC: 89 MG/DL — SIGNIFICANT CHANGE UP (ref 70–99)
HCT VFR BLD CALC: 28.1 % — LOW (ref 34.5–45)
HGB BLD-MCNC: 9.2 G/DL — LOW (ref 11.5–15.5)
MAGNESIUM SERPL-MCNC: 1.9 MG/DL — SIGNIFICANT CHANGE UP (ref 1.6–2.6)
MCHC RBC-ENTMCNC: 29.6 PG — SIGNIFICANT CHANGE UP (ref 27–34)
MCHC RBC-ENTMCNC: 32.7 % — SIGNIFICANT CHANGE UP (ref 32–36)
MCV RBC AUTO: 90.4 FL — SIGNIFICANT CHANGE UP (ref 80–100)
NRBC # FLD: 0 — SIGNIFICANT CHANGE UP
PHOSPHATE SERPL-MCNC: 2.3 MG/DL — LOW (ref 2.5–4.5)
PLATELET # BLD AUTO: 316 K/UL — SIGNIFICANT CHANGE UP (ref 150–400)
PMV BLD: 11.3 FL — SIGNIFICANT CHANGE UP (ref 7–13)
POTASSIUM SERPL-MCNC: 3.6 MMOL/L — SIGNIFICANT CHANGE UP (ref 3.5–5.3)
POTASSIUM SERPL-SCNC: 3.6 MMOL/L — SIGNIFICANT CHANGE UP (ref 3.5–5.3)
RBC # BLD: 3.11 M/UL — LOW (ref 3.8–5.2)
RBC # FLD: 16.5 % — HIGH (ref 10.3–14.5)
SODIUM SERPL-SCNC: 143 MMOL/L — SIGNIFICANT CHANGE UP (ref 135–145)
WBC # BLD: 8.48 K/UL — SIGNIFICANT CHANGE UP (ref 3.8–10.5)
WBC # FLD AUTO: 8.48 K/UL — SIGNIFICANT CHANGE UP (ref 3.8–10.5)

## 2017-09-17 PROCEDURE — 99233 SBSQ HOSP IP/OBS HIGH 50: CPT | Mod: GC

## 2017-09-17 RX ORDER — POTASSIUM PHOSPHATE, MONOBASIC POTASSIUM PHOSPHATE, DIBASIC 236; 224 MG/ML; MG/ML
15 INJECTION, SOLUTION INTRAVENOUS ONCE
Qty: 0 | Refills: 0 | Status: COMPLETED | OUTPATIENT
Start: 2017-09-17 | End: 2017-09-17

## 2017-09-17 RX ORDER — WARFARIN SODIUM 2.5 MG/1
1 TABLET ORAL
Qty: 0 | Refills: 0 | COMMUNITY

## 2017-09-17 RX ADMIN — PANTOPRAZOLE SODIUM 40 MILLIGRAM(S): 20 TABLET, DELAYED RELEASE ORAL at 06:16

## 2017-09-17 RX ADMIN — Medication 400 MILLIGRAM(S): at 21:01

## 2017-09-17 RX ADMIN — Medication 100 MILLIGRAM(S): at 21:01

## 2017-09-17 RX ADMIN — Medication 100 MILLIGRAM(S): at 06:16

## 2017-09-17 RX ADMIN — Medication 100 MILLIGRAM(S): at 13:13

## 2017-09-17 RX ADMIN — ATORVASTATIN CALCIUM 40 MILLIGRAM(S): 80 TABLET, FILM COATED ORAL at 21:00

## 2017-09-17 RX ADMIN — SENNA PLUS 2 TABLET(S): 8.6 TABLET ORAL at 21:00

## 2017-09-17 RX ADMIN — CEFTRIAXONE 100 GRAM(S): 500 INJECTION, POWDER, FOR SOLUTION INTRAMUSCULAR; INTRAVENOUS at 17:50

## 2017-09-17 RX ADMIN — POTASSIUM PHOSPHATE, MONOBASIC POTASSIUM PHOSPHATE, DIBASIC 62.5 MILLIMOLE(S): 236; 224 INJECTION, SOLUTION INTRAVENOUS at 13:13

## 2017-09-17 RX ADMIN — Medication 400 MILLIGRAM(S): at 06:15

## 2017-09-17 RX ADMIN — AZITHROMYCIN 250 MILLIGRAM(S): 500 TABLET, FILM COATED ORAL at 17:53

## 2017-09-17 RX ADMIN — Medication 100 MILLIGRAM(S): at 06:15

## 2017-09-17 RX ADMIN — Medication 400 MILLIGRAM(S): at 13:13

## 2017-09-17 RX ADMIN — Medication 100 MILLIGRAM(S): at 17:49

## 2017-09-17 NOTE — PROGRESS NOTE ADULT - ATTENDING COMMENTS
Patient was seen and examined personally by me. I have discussed the plan and reviewed the resident's note and agree with the above physical exam findings including assessment and plan except as indicated below.    No new complaints. No further hemoptysis. H/H stable.  Plan to continue IV abx through Monday and d/c home Monday AM.  BATSHEVA resolved on IVF, can DC IVF today, encourage PO hydration

## 2017-09-17 NOTE — DISCHARGE NOTE ADULT - PLAN OF CARE
Management of condition You came to the hospital per referral from your primary care doctor for an elevated INR. The INR is a number that indicates how well your body is able to form clots. Your number was very elevated so you were given treatments to help lower this number. You will not be discharged on coumadin but you will be discharged on Aspirin 81 mg to be taken every day. Please follow up with Dr. Currie in one week following discharge for further care. Your INR was very high when you came to the hospital. We believe that this could be one of the main reasons why you were coughing up blood. To treat this, we gave you treatments to help reduce the INR number and help you clot better. Your numbers normalized with these treatments. Please follow up with Dr. Currie in one week following discharge for further care.

## 2017-09-17 NOTE — DISCHARGE NOTE ADULT - PROVIDER TOKENS
FREE:[LAST:[Travon],FIRST:[Tim DÍAZ],PHONE:[phone]],FAX:[(   [phone]    -],ADDRESS:[[address] [place] [address]]]

## 2017-09-17 NOTE — DISCHARGE NOTE ADULT - PATIENT PORTAL LINK FT
“You can access the [employer] Patient Portal, offered by [facility], by registering with the following website: [email or web address]”

## 2017-09-17 NOTE — DISCHARGE NOTE ADULT - HOSPITAL COURSE
88 yo F w/ PMHx of PPM (2013), A-fib on coumadin, HTN, colon cancer s/p hemicolectomy (1993), breast cancer s/p mastectomy, LLE arterial bypass who presented to the ED per referral from her PMD for elevated INR. Pt was in her usual state of health until last week when she began experiencing weakness, lethargy, SOB, and productive cough. Pt notes her symptoms progressively worsened throughout last week prompting her to visit her PMD. Pt was given Cefuroxime for presumed bronchitis- pt stopped after 3 doses. Pt was called AM of presentation for elevated INR of 6.9 and Hb 11 (baseline 13-14 based on lab records on [facility]). Repeat in ED 6.7. Pt endorses week long hx of productive cough of brown/black sputum which she says she has never experienced before. Morning of presentation, pt reports coughing up bright red blood. She reports she has been adherent and strict with her coumadin and takes her medications appropriately.  Pt endorses smoking 2-3ppd for 40+ years- quit in 1993. No FH of colon CA or coagulopathies. In the ED, CXR showed clear lungs with trace right and probably left effusion. CT Chest showed right upper lobe groundglass opacity which may represent pulmonary hemorrhage. Pt admitted to medicine floors for further management of supratherapeutic INR and hemoptysis. Pt given Vitamin K and FFP. Pulm was consulted to evaluate hemoptysis- recommended duonebs, abx for CAP, and follow up Imaging after resolution of hemoptysis to evaluate for underlying etiology. GI was also consulted to evaluate possibility of UGIB- recommended Protonix 40mg qd. Pt adamantly refused any invasive procedure such as EGD, colonoscopy, or bronchoscopy. She was explained  the risks/benefits of these procedures in determining an underlying of her presenting symptoms however pt refused and requested she be treated symptomatically only. Pt PMD Dr. Cool made aware of hospitalization and requested pt be d/c on ASA 81 mg. Pt hemoptysis and cough improved throughout hospital course. She was hemodynamically stable upon discharge. She was advised to follow up with Dr. Cool in one week following discharge for further management and care.

## 2017-09-17 NOTE — DISCHARGE NOTE ADULT - ADDITIONAL INSTRUCTIONS
Please follow up with Dr. Currie in one week following discharge for further treatment and care.   Please take Aspirin 81 mg once per day.

## 2017-09-17 NOTE — DISCHARGE NOTE ADULT - MEDICATION SUMMARY - MEDICATIONS TO TAKE
I will START or STAY ON the medications listed below when I get home from the hospital:    Cozaar  -- 50 milligram(s) by mouth once a day  -- Indication: For Essential hypertension    Lipitor  -- 40 milligram(s) by mouth once a day  -- Indication: For Hyperlipidemia    Norvasc  -- 10 milligram(s) by mouth once a day  -- Indication: For Essential hypertension    TRENtal 400 mg oral tablet, extended release  -- 1 tab(s) by mouth 3 times a day  -- Indication: For Peripheral artery disease

## 2017-09-17 NOTE — DISCHARGE NOTE ADULT - CARE PLAN
Principal Discharge DX:	Elevated INR  Goal:	Management of condition  Instructions for follow-up, activity and diet:	You came to the hospital per referral from your primary care doctor for an elevated INR. The INR is a number that indicates how well your body is able to form clots. Your number was very elevated so you were given treatments to help lower this number. You will not be discharged on coumadin but you will be discharged on Aspirin 81 mg to be taken every day. Please follow up with Dr. Currie in one week following discharge for further care.  Secondary Diagnosis:	Hemoptysis  Goal:	Management of condition  Instructions for follow-up, activity and diet:	Your INR was very high when you came to the hospital. We believe that this could be one of the main reasons why you were coughing up blood. To treat this, we gave you treatments to help reduce the INR number and help you clot better. Your numbers normalized with these treatments. Please follow up with Dr. Currie in one week following discharge for further care.

## 2017-09-17 NOTE — PROGRESS NOTE ADULT - PROBLEM SELECTOR PLAN 1
- now improve with suppression of cough; cont Tessalon Pearle   - CT Chest showing RUL ground-glass opacity which may represent   pulmonary hemorrhage in setting of supratherapeutic INR on admission   - possibly secondary to occult lung malignancy given extensive smoking history (100-pack years) vs. bronchitis vs. pneumonia  - INR now within normal range s/p 5 units vit K and FFP  - patient declines further evaluation by bronchoscopy or endoscopy to evaluate for possible upper GI causes  - given risk of continued bleeding on Coumadin, decision made to with hold further AC with Coumadin and discharge on ASA 81, patient and PMD in agreement  - Hb stable, transfuse Hb < 7.0

## 2017-09-17 NOTE — DISCHARGE NOTE ADULT - CARE PROVIDER_API CALL
Tim Cool  [id number] [place] [address]  Phone: [phone]  Fax: (   )    -

## 2017-09-17 NOTE — PROGRESS NOTE ADULT - PROBLEM SELECTOR PLAN 3
- positive FOBT on admission; possibly due to PUD vs. malignancy vs. angiodysplasia/ectasia  - cont Protonix 40 daily; patient declining further evaluation via endoscopy

## 2017-09-17 NOTE — DISCHARGE NOTE ADULT - CONDITIONS AT DISCHARGE
This Patient is stable , alert , oriented x 4, is self sufficient with her care; Skin remains intact, vital signs are stable; INR is 1.23 Seconds and the Coughing and Hemoptysis are resolving .  Hycodan as needed for the Cough is taken.  She will be transported by the [facility].  Instructions are discussed and provided

## 2017-09-17 NOTE — PROGRESS NOTE ADULT - SUBJECTIVE AND OBJECTIVE BOX
Patient is a 89y old  Female who presents with a chief complaint of Elevated INR (12 Sep 2017 17:14)      SUBJECTIVE / OVERNIGHT EVENTS:    Cough much improved, pt denies any scant hemoptysis over night. Reports feeling well. No chest pain, SOB, dizziness or lightheadedness.     MEDICATIONS  (STANDING):  atorvastatin 40 milliGRAM(s) Oral at bedtime  pentoxifylline 400 milliGRAM(s) Oral three times a day  influenza   Vaccine 0.5 milliLiter(s) IntraMuscular once  azithromycin  IVPB 500 milliGRAM(s) IV Intermittent every 24 hours  cefTRIAXone   IVPB 1 Gram(s) IV Intermittent every 24 hours  pantoprazole    Tablet 40 milliGRAM(s) Oral before breakfast  benzonatate 100 milliGRAM(s) Oral every 6 hours  docusate sodium 100 milliGRAM(s) Oral three times a day  senna 2 Tablet(s) Oral at bedtime  sodium chloride 0.9%. 1000 milliLiter(s) (75 mL/Hr) IV Continuous <Continuous>    MEDICATIONS  (PRN):  ALBUTerol/ipratropium for Nebulization 3 milliLiter(s) Nebulizer every 6 hours PRN Shortness of Breath and/or Wheezing  simethicone 80 milliGRAM(s) Chew every 2 hours PRN Gas    Vital Signs Last 24 Hrs  T(C): 36.5 (17 Sep 2017 05:32), Max: 36.8 (16 Sep 2017 22:20)  T(F): 97.7 (17 Sep 2017 05:32), Max: 98.2 (16 Sep 2017 22:20)  HR: 92 (17 Sep 2017 05:32) (70 - 92)  BP: 146/82 (17 Sep 2017 05:32) (146/82 - 156/78)  BP(mean): --  RR: 18 (17 Sep 2017 05:32) (18 - 18)  SpO2: 98% (17 Sep 2017 05:32) (96% - 98%)    PHYSICAL EXAM:  GENERAL: NAD, well-developed, well-groomed, pleasant  HEAD:  Atraumatic, Normocephalic  EYES: EOMI, PERRLA, conjunctiva and sclera clear  NECK: Supple, No JVD  CHEST/LUNG: Clear to auscultation bilaterally +breath sounds diminished in RUL  HEART: Regular rate and rhythm; No murmurs, rubs, or gallops  ABDOMEN: Soft, Nontender, Nondistended; Bowel sounds present  EXTREMITIES:  2+ peripheral Pulses, no clubbing, cyanosis, or edema  PSYCH: AAOx3  NEUROLOGY: non-focal  SKIN: No rashes or lesions    LABS:                        8.5    8.62  )-----------( 276      ( 15 Sep 2017 05:19 )             26.6     09-15    143  |  111<H>  |  23  ----------------------------<  95  4.9   |  20<L>  |  1.45<H>    Ca    8.8      15 Sep 2017 05:19  Phos  3.4     09-15  Mg     1.9     09-15        RADIOLOGY & ADDITIONAL TESTS:    Imaging Personally Reviewed:    Consultant(s) Notes Reviewed:  Gasteroenterology    Care Discussed with Consultants/Other Providers: Patient is a 89y old  Female who presents with a chief complaint of Elevated INR (12 Sep 2017 17:14)      SUBJECTIVE / OVERNIGHT EVENTS: Cough much improved, pt denies any scant hemoptysis over night. Reports feeling well. No chest pain, SOB, dizziness or lightheadedness.     MEDICATIONS  (STANDING):  atorvastatin 40 milliGRAM(s) Oral at bedtime  pentoxifylline 400 milliGRAM(s) Oral three times a day  influenza   Vaccine 0.5 milliLiter(s) IntraMuscular once  azithromycin  IVPB 500 milliGRAM(s) IV Intermittent every 24 hours  cefTRIAXone   IVPB 1 Gram(s) IV Intermittent every 24 hours  pantoprazole    Tablet 40 milliGRAM(s) Oral before breakfast  benzonatate 100 milliGRAM(s) Oral every 6 hours  docusate sodium 100 milliGRAM(s) Oral three times a day  senna 2 Tablet(s) Oral at bedtime  sodium chloride 0.9%. 1000 milliLiter(s) (75 mL/Hr) IV Continuous <Continuous>    MEDICATIONS  (PRN):  ALBUTerol/ipratropium for Nebulization 3 milliLiter(s) Nebulizer every 6 hours PRN Shortness of Breath and/or Wheezing  simethicone 80 milliGRAM(s) Chew every 2 hours PRN Gas    Vital Signs Last 24 Hrs  T(C): 36.5 (17 Sep 2017 05:32), Max: 36.8 (16 Sep 2017 22:20)  T(F): 97.7 (17 Sep 2017 05:32), Max: 98.2 (16 Sep 2017 22:20)  HR: 92 (17 Sep 2017 05:32) (70 - 92)  BP: 146/82 (17 Sep 2017 05:32) (146/82 - 156/78)  BP(mean): --  RR: 18 (17 Sep 2017 05:32) (18 - 18)  SpO2: 98% (17 Sep 2017 05:32) (96% - 98%)    PHYSICAL EXAM:  GENERAL: NAD, well-developed, well-groomed, pleasant  HEAD:  Atraumatic, Normocephalic  EYES: EOMI, PERRLA, conjunctiva and sclera clear  NECK: Supple, No JVD  CHEST/LUNG: Clear to auscultation bilaterally +breath sounds diminished in RUL  HEART: Regular rate and rhythm; No murmurs, rubs, or gallops  ABDOMEN: Soft, Nontender, Nondistended; Bowel sounds present  EXTREMITIES:  2+ peripheral Pulses, no clubbing, cyanosis, or edema  PSYCH: AAOx3  NEUROLOGY: non-focal  SKIN: No rashes or lesions    LABS:                        8.5    8.62  )-----------( 276      ( 15 Sep 2017 05:19 )             26.6     09-15    143  |  111<H>  |  23  ----------------------------<  95  4.9   |  20<L>  |  1.45<H>    Ca    8.8      15 Sep 2017 05:19  Phos  3.4     09-15  Mg     1.9     09-15        RADIOLOGY & ADDITIONAL TESTS:    Imaging Personally Reviewed:    Consultant(s) Notes Reviewed:  Gasteroenterology    Care Discussed with Consultants/Other Providers:

## 2017-09-17 NOTE — DISCHARGE NOTE ADULT - MEDICATION SUMMARY - MEDICATIONS TO STOP TAKING
I will STOP taking the medications listed below when I get home from the hospital:    Coumadin 2 mg oral tablet  -- 1 tab(s) by mouth every other day (at bedtime)    Coumadin 1 mg oral tablet  -- 1 tab(s) by mouth every other day (at bedtime)

## 2017-09-18 VITALS
TEMPERATURE: 98 F | HEART RATE: 87 BPM | DIASTOLIC BLOOD PRESSURE: 73 MMHG | OXYGEN SATURATION: 96 % | SYSTOLIC BLOOD PRESSURE: 143 MMHG | RESPIRATION RATE: 18 BRPM

## 2017-09-18 PROCEDURE — 99239 HOSP IP/OBS DSCHRG MGMT >30: CPT

## 2017-09-18 RX ORDER — ASPIRIN/CALCIUM CARB/MAGNESIUM 324 MG
1 TABLET ORAL
Qty: 14 | Refills: 0
Start: 2017-09-18 | End: 2017-10-02

## 2017-09-18 RX ADMIN — Medication 100 MILLIGRAM(S): at 05:56

## 2017-09-18 RX ADMIN — Medication 100 MILLIGRAM(S): at 13:09

## 2017-09-18 RX ADMIN — Medication 400 MILLIGRAM(S): at 13:09

## 2017-09-18 RX ADMIN — PANTOPRAZOLE SODIUM 40 MILLIGRAM(S): 20 TABLET, DELAYED RELEASE ORAL at 05:57

## 2017-09-18 RX ADMIN — Medication 100 MILLIGRAM(S): at 05:57

## 2017-09-18 RX ADMIN — Medication 400 MILLIGRAM(S): at 05:56

## 2017-09-18 NOTE — PROGRESS NOTE ADULT - PROVIDER SPECIALTY LIST ADULT
Gastroenterology
Gastroenterology
Internal Medicine
Gastroenterology
Internal Medicine

## 2017-09-18 NOTE — PROGRESS NOTE ADULT - PROBLEM SELECTOR PLAN 1
- now improve with suppression of cough; cont Tessalon Pearle   - CT Chest showing RUL ground-glass opacity which may represent   pulmonary hemorrhage in setting of supratherapeutic INR on admission   - possibly secondary to occult lung malignancy given extensive smoking history (100-pack years) vs. bronchitis vs. pneumonia  - INR now within normal range s/p 5 units vit K and FFP  - patient declines further evaluation by bronchoscopy or endoscopy to evaluate for possible upper GI causes  - given risk of continued bleeding on Coumadin, decision made to with hold further AC with Coumadin and discharge on ASA 81, patient and PMD in agreement. Likely d/c today.   - Hb stable, transfuse Hb < 7.0

## 2017-09-18 NOTE — PROGRESS NOTE ADULT - SUBJECTIVE AND OBJECTIVE BOX
Patient is a 89y old  Female who presents with a chief complaint of Elevated INR (12 Sep 2017 17:14)      SUBJECTIVE / OVERNIGHT EVENTS: Pt denies any episodes of hemoptysis over night. Reports feeling well. No chest pain, SOB, dizziness or lightheadedness.     MEDICATIONS  (STANDING):  atorvastatin 40 milliGRAM(s) Oral at bedtime  pentoxifylline 400 milliGRAM(s) Oral three times a day  influenza   Vaccine 0.5 milliLiter(s) IntraMuscular once  azithromycin  IVPB 500 milliGRAM(s) IV Intermittent every 24 hours  cefTRIAXone   IVPB 1 Gram(s) IV Intermittent every 24 hours  pantoprazole    Tablet 40 milliGRAM(s) Oral before breakfast  benzonatate 100 milliGRAM(s) Oral every 6 hours  docusate sodium 100 milliGRAM(s) Oral three times a day  senna 2 Tablet(s) Oral at bedtime  sodium chloride 0.9%. 1000 milliLiter(s) (75 mL/Hr) IV Continuous <Continuous>    MEDICATIONS  (PRN):  ALBUTerol/ipratropium for Nebulization 3 milliLiter(s) Nebulizer every 6 hours PRN Shortness of Breath and/or Wheezing  simethicone 80 milliGRAM(s) Chew every 2 hours PRN Gas    Vital Signs Last 24 Hrs  T(C): 36.6 (18 Sep 2017 05:36), Max: 36.7 (17 Sep 2017 21:06)  T(F): 97.9 (18 Sep 2017 05:36), Max: 98.1 (17 Sep 2017 21:06)  HR: 65 (18 Sep 2017 05:36) (65 - 87)  BP: 150/82 (18 Sep 2017 05:36) (150/82 - 153/87)  BP(mean): --  RR: 18 (18 Sep 2017 05:36) (16 - 18)  SpO2: 96% (18 Sep 2017 05:36) (95% - 99%)    PHYSICAL EXAM:  GENERAL: NAD, well-developed, well-groomed, pleasant  HEAD:  Atraumatic, Normocephalic  EYES: EOMI, PERRLA, conjunctiva and sclera clear  NECK: Supple, No JVD  CHEST/LUNG: Clear to auscultation bilaterally +breath sounds diminished in RUL  HEART: Regular rate and rhythm; No murmurs, rubs, or gallops  ABDOMEN: Soft, Nontender, Nondistended; Bowel sounds present  EXTREMITIES:  2+ peripheral Pulses, no clubbing, cyanosis, or edema  PSYCH: AAOx3  NEUROLOGY: non-focal  SKIN: No rashes or lesions                                9.2    8.48  )-----------( 316      ( 17 Sep 2017 06:30 )             28.1       09-17    143  |  109<H>  |  18  ----------------------------<  89  3.6   |  20<L>  |  1.10    Ca    8.9      17 Sep 2017 06:30  Phos  2.3     09-17  Mg     1.9     09-17                        Lactate Trend            CAPILLARY BLOOD GLUCOSE                RADIOLOGY & ADDITIONAL TESTS:    Imaging Personally Reviewed:    Consultant(s) Notes Reviewed:  Gasteroenterology    Care Discussed with Consultants/Other Providers:

## 2017-09-18 NOTE — PROGRESS NOTE ADULT - ATTENDING COMMENTS
pt stable for dc.  not further hemoptysis.  o/p follow-up with PMD, no further coumadin.  ASA for stroke prevention.  DC time 45 minutes

## 2017-11-27 ENCOUNTER — APPOINTMENT (OUTPATIENT)
Dept: ELECTROPHYSIOLOGY | Facility: CLINIC | Age: 82
End: 2017-11-27
Payer: MEDICARE

## 2017-11-27 VITALS — HEART RATE: 66 BPM | SYSTOLIC BLOOD PRESSURE: 142 MMHG | DIASTOLIC BLOOD PRESSURE: 74 MMHG

## 2017-11-27 PROCEDURE — 93279 PRGRMG DEV EVAL PM/LDLS PM: CPT

## 2018-03-01 ENCOUNTER — APPOINTMENT (OUTPATIENT)
Dept: ELECTROPHYSIOLOGY | Facility: CLINIC | Age: 83
End: 2018-03-01
Payer: MEDICARE

## 2018-03-01 PROCEDURE — 93294 REM INTERROG EVL PM/LDLS PM: CPT

## 2018-03-01 PROCEDURE — 93296 REM INTERROG EVL PM/IDS: CPT

## 2018-06-11 ENCOUNTER — APPOINTMENT (OUTPATIENT)
Dept: ELECTROPHYSIOLOGY | Facility: CLINIC | Age: 83
End: 2018-06-11
Payer: MEDICARE

## 2018-06-11 PROCEDURE — 93296 REM INTERROG EVL PM/IDS: CPT

## 2018-06-11 PROCEDURE — 93294 REM INTERROG EVL PM/LDLS PM: CPT

## 2018-09-18 ENCOUNTER — APPOINTMENT (OUTPATIENT)
Dept: ELECTROPHYSIOLOGY | Facility: CLINIC | Age: 83
End: 2018-09-18
Payer: MEDICARE

## 2018-09-18 PROCEDURE — 93294 REM INTERROG EVL PM/LDLS PM: CPT

## 2018-09-18 PROCEDURE — 93296 REM INTERROG EVL PM/IDS: CPT

## 2018-12-20 ENCOUNTER — APPOINTMENT (OUTPATIENT)
Dept: ELECTROPHYSIOLOGY | Facility: CLINIC | Age: 83
End: 2018-12-20

## 2019-03-25 ENCOUNTER — APPOINTMENT (OUTPATIENT)
Dept: ELECTROPHYSIOLOGY | Facility: CLINIC | Age: 84
End: 2019-03-25
Payer: MEDICARE

## 2019-03-25 PROBLEM — C18.9 MALIGNANT NEOPLASM OF COLON, UNSPECIFIED: Chronic | Status: ACTIVE | Noted: 2017-09-12

## 2019-03-25 PROBLEM — Z95.0 PRESENCE OF CARDIAC PACEMAKER: Chronic | Status: ACTIVE | Noted: 2017-09-12

## 2019-03-25 PROBLEM — C50.919 MALIGNANT NEOPLASM OF UNSPECIFIED SITE OF UNSPECIFIED FEMALE BREAST: Chronic | Status: ACTIVE | Noted: 2017-09-12

## 2019-03-25 PROCEDURE — 93279 PRGRMG DEV EVAL PM/LDLS PM: CPT

## 2019-05-15 ENCOUNTER — INPATIENT (INPATIENT)
Facility: HOSPITAL | Age: 84
LOS: 0 days | Discharge: HOME HEALTH | DRG: 184 | End: 2019-05-16
Attending: INTERNAL MEDICINE | Admitting: INTERNAL MEDICINE
Payer: MEDICARE

## 2019-05-15 VITALS
HEIGHT: 61 IN | TEMPERATURE: 98 F | RESPIRATION RATE: 18 BRPM | WEIGHT: 126.1 LBS | OXYGEN SATURATION: 95 % | SYSTOLIC BLOOD PRESSURE: 168 MMHG | DIASTOLIC BLOOD PRESSURE: 74 MMHG | HEART RATE: 68 BPM

## 2019-05-15 DIAGNOSIS — S22.39XA FRACTURE OF ONE RIB, UNSPECIFIED SIDE, INITIAL ENCOUNTER FOR CLOSED FRACTURE: ICD-10-CM

## 2019-05-15 LAB
ALBUMIN SERPL ELPH-MCNC: 3.6 G/DL — SIGNIFICANT CHANGE UP (ref 3.5–5)
ALP SERPL-CCNC: 64 U/L — SIGNIFICANT CHANGE UP (ref 40–120)
ALT FLD-CCNC: 22 U/L DA — SIGNIFICANT CHANGE UP (ref 10–60)
ANION GAP SERPL CALC-SCNC: 6 MMOL/L — SIGNIFICANT CHANGE UP (ref 5–17)
AST SERPL-CCNC: 25 U/L — SIGNIFICANT CHANGE UP (ref 10–40)
BASOPHILS # BLD AUTO: 0.07 K/UL — SIGNIFICANT CHANGE UP (ref 0–0.2)
BASOPHILS NFR BLD AUTO: 0.7 % — SIGNIFICANT CHANGE UP (ref 0–2)
BILIRUB SERPL-MCNC: 0.8 MG/DL — SIGNIFICANT CHANGE UP (ref 0.2–1.2)
BUN SERPL-MCNC: 31 MG/DL — HIGH (ref 7–18)
CALCIUM SERPL-MCNC: 8.9 MG/DL — SIGNIFICANT CHANGE UP (ref 8.4–10.5)
CHLORIDE SERPL-SCNC: 114 MMOL/L — HIGH (ref 96–108)
CK SERPL-CCNC: 144 U/L — SIGNIFICANT CHANGE UP (ref 21–215)
CO2 SERPL-SCNC: 23 MMOL/L — SIGNIFICANT CHANGE UP (ref 22–31)
CREAT SERPL-MCNC: 1.49 MG/DL — HIGH (ref 0.5–1.3)
EOSINOPHIL # BLD AUTO: 0.14 K/UL — SIGNIFICANT CHANGE UP (ref 0–0.5)
EOSINOPHIL NFR BLD AUTO: 1.4 % — SIGNIFICANT CHANGE UP (ref 0–6)
GLUCOSE SERPL-MCNC: 94 MG/DL — SIGNIFICANT CHANGE UP (ref 70–99)
HCT VFR BLD CALC: 38.6 % — SIGNIFICANT CHANGE UP (ref 34.5–45)
HGB BLD-MCNC: 13 G/DL — SIGNIFICANT CHANGE UP (ref 11.5–15.5)
IMM GRANULOCYTES NFR BLD AUTO: 0.3 % — SIGNIFICANT CHANGE UP (ref 0–1.5)
LYMPHOCYTES # BLD AUTO: 0.58 K/UL — LOW (ref 1–3.3)
LYMPHOCYTES # BLD AUTO: 5.9 % — LOW (ref 13–44)
MAGNESIUM SERPL-MCNC: 2.3 MG/DL — SIGNIFICANT CHANGE UP (ref 1.6–2.6)
MCHC RBC-ENTMCNC: 30.4 PG — SIGNIFICANT CHANGE UP (ref 27–34)
MCHC RBC-ENTMCNC: 33.7 GM/DL — SIGNIFICANT CHANGE UP (ref 32–36)
MCV RBC AUTO: 90.2 FL — SIGNIFICANT CHANGE UP (ref 80–100)
MONOCYTES # BLD AUTO: 0.87 K/UL — SIGNIFICANT CHANGE UP (ref 0–0.9)
MONOCYTES NFR BLD AUTO: 8.8 % — SIGNIFICANT CHANGE UP (ref 2–14)
NEUTROPHILS # BLD AUTO: 8.21 K/UL — HIGH (ref 1.8–7.4)
NEUTROPHILS NFR BLD AUTO: 82.9 % — HIGH (ref 43–77)
NRBC # BLD: 0 /100 WBCS — SIGNIFICANT CHANGE UP (ref 0–0)
PLATELET # BLD AUTO: 211 K/UL — SIGNIFICANT CHANGE UP (ref 150–400)
POTASSIUM SERPL-MCNC: 4.6 MMOL/L — SIGNIFICANT CHANGE UP (ref 3.5–5.3)
POTASSIUM SERPL-SCNC: 4.6 MMOL/L — SIGNIFICANT CHANGE UP (ref 3.5–5.3)
PROT SERPL-MCNC: 7.1 G/DL — SIGNIFICANT CHANGE UP (ref 6–8.3)
RBC # BLD: 4.28 M/UL — SIGNIFICANT CHANGE UP (ref 3.8–5.2)
RBC # FLD: 14.6 % — HIGH (ref 10.3–14.5)
SODIUM SERPL-SCNC: 143 MMOL/L — SIGNIFICANT CHANGE UP (ref 135–145)
WBC # BLD: 9.9 K/UL — SIGNIFICANT CHANGE UP (ref 3.8–10.5)
WBC # FLD AUTO: 9.9 K/UL — SIGNIFICANT CHANGE UP (ref 3.8–10.5)

## 2019-05-15 PROCEDURE — 99285 EMERGENCY DEPT VISIT HI MDM: CPT

## 2019-05-15 PROCEDURE — 71250 CT THORAX DX C-: CPT | Mod: 26

## 2019-05-15 RX ORDER — MORPHINE SULFATE 50 MG/1
2 CAPSULE, EXTENDED RELEASE ORAL ONCE
Refills: 0 | Status: DISCONTINUED | OUTPATIENT
Start: 2019-05-15 | End: 2019-05-15

## 2019-05-15 RX ORDER — ONDANSETRON 8 MG/1
4 TABLET, FILM COATED ORAL ONCE
Refills: 0 | Status: COMPLETED | OUTPATIENT
Start: 2019-05-15 | End: 2019-05-15

## 2019-05-15 RX ORDER — LIDOCAINE 4 G/100G
1 CREAM TOPICAL DAILY
Refills: 0 | Status: DISCONTINUED | OUTPATIENT
Start: 2019-05-15 | End: 2019-05-16

## 2019-05-15 RX ADMIN — ONDANSETRON 4 MILLIGRAM(S): 8 TABLET, FILM COATED ORAL at 23:51

## 2019-05-15 RX ADMIN — MORPHINE SULFATE 2 MILLIGRAM(S): 50 CAPSULE, EXTENDED RELEASE ORAL at 23:51

## 2019-05-15 RX ADMIN — LIDOCAINE 1 PATCH: 4 CREAM TOPICAL at 23:51

## 2019-05-15 RX ADMIN — ONDANSETRON 4 MILLIGRAM(S): 8 TABLET, FILM COATED ORAL at 15:31

## 2019-05-15 RX ADMIN — MORPHINE SULFATE 2 MILLIGRAM(S): 50 CAPSULE, EXTENDED RELEASE ORAL at 22:15

## 2019-05-15 RX ADMIN — MORPHINE SULFATE 2 MILLIGRAM(S): 50 CAPSULE, EXTENDED RELEASE ORAL at 15:31

## 2019-05-15 NOTE — ED PROVIDER NOTE - OBJECTIVE STATEMENT
89 y.o. female BIBA, pt lives alone, pt stepped on her step ladder on sun to clean her windows on Sun., pt lost balance & fell on her Lt side, pt broke her coffee table.  Pt was able to get up immediately.  pt developed Lt sided back pain, worse since last night, coughing, sob,  Pt went to see PMD, today, sent to ED for evaluation, no head injury.  Pt took tylenol with no relief

## 2019-05-15 NOTE — H&P ADULT - HISTORY OF PRESENT ILLNESS
89 years old female from home, who lives alone, walks with a stick,  with PMHx of atrial fibrillation (Not on NOAC for GI bleed), HTN, breat cancer, Pacemaker presented to ED with back pain s/p fall 3 days ago. patient stepped on her step ladder  to clean her windows on Sunday. pt lost balance & fell on her Lt side, pt broke her coffee table. Patient denies any room spinning sensation, lightheadedness or dizziness.  Pt was able to get up immediately.  pt developed Lt sided back pain, worse since last night,it was 10/10 in intensity, localized to left upper chest, aggravated by movement or deep breathing.   Pt went to see PMD, today, sent to ED for evaluation, no head injury.  Pt took Tylenol with no relief.  Pt denies any fever, chest pain, dyspnea, abdominal pain, vomiting, diarrhea , change in urinary or bowel habits.

## 2019-05-15 NOTE — ED ADULT NURSE NOTE - OBJECTIVE STATEMENT
pt is here for s/p fall.  pt stated that cleaning window and fell down, c/o left side back pain, denied headache or dizziness, denied chest pain or sob, pt calm at this time.

## 2019-05-15 NOTE — H&P ADULT - PROBLEM SELECTOR PLAN 4
Pt has history of HTN for which she takes amlodipine 10 mg at home.  Will continue that.  DASH TLC diet.

## 2019-05-15 NOTE — H&P ADULT - PROBLEM SELECTOR PLAN 1
pt presented with mechanical fall by slipping on sunday.  Pain was initially bearable but yesterday it become 10/10 and aggravated by breathing.  Patient has history of A fib but is not on any blood thinner.  Pain is localized to left upper back.  CT scan showed minimally displaced fracture of posterior 9 and 10th ribs.  Pain control with percocet and Tylenol.  Incentive spirometry.  PT evaluation.

## 2019-05-15 NOTE — ED ADULT NURSE NOTE - ED STAT RN HANDOFF DETAILS
pt.remained  stable. denies pain. transfer to [facility] report given to parag gerber.not in distress

## 2019-05-15 NOTE — ED PROVIDER NOTE - MUSCULOSKELETAL, MLM
Spine appears kyphotic, range of motion is not limited, no muscle or joint tenderness, RLE>LLE chronic

## 2019-05-15 NOTE — H&P ADULT - PROBLEM SELECTOR PLAN 2
pt presented with mechanical fall by slipping on sunday.  Patient has history of A fib but is not on any blood thinner.  fall is purely mechanical and pt denies any dizziness, lightheadedness or any room spinning sensation.  PT evaluation.  Case management. pt lives alone.

## 2019-05-15 NOTE — H&P ADULT - PROBLEM SELECTOR PLAN 5
pt has history of atrial fibrillation but NOAC was stopped with concern for GI bleed.  Rate is controlled.

## 2019-05-15 NOTE — H&P ADULT - NSHPPHYSICALEXAM_GEN_ALL_CORE
Vital Signs Last 24 Hrs  T(C): 36.3 (16 May 2019 05:35), Max: 37.2 (15 May 2019 16:03)  T(F): 97.3 (16 May 2019 05:35), Max: 99 (15 May 2019 16:03)  HR: 61 (16 May 2019 05:35) (61 - 77)  BP: 145/69 (16 May 2019 05:35) (145/69 - 168/74)  BP(mean): --  RR: 17 (16 May 2019 05:35) (17 - 18)  SpO2: 96% (16 May 2019 05:35) (93% - 98%)

## 2019-05-15 NOTE — H&P ADULT - PROBLEM SELECTOR PLAN 3
pt presented with BATSHEVA with elevated creatinine to 1.49. baseline 2 years ago is 1.20.  Will give gentle hydration.  f/u BMP

## 2019-05-15 NOTE — H&P ADULT - NSICDXPASTSURGICALHX_GEN_ALL_CORE_FT
PAST SURGICAL HISTORY:  S/P colon resection 1993 for colon ca.    S/P hysterectomy     S/P mastectomy 1990    S/P peripheral artery bypass in 1994

## 2019-05-15 NOTE — ED ADULT NURSE NOTE - NSIMPLEMENTINTERV_GEN_ALL_ED
Implemented All Fall with Harm Risk Interventions:  [place] to call system. Call bell, personal items and telephone within reach. Instruct patient to call for assistance. Room bathroom lighting operational. Non-slip footwear when patient is off stretcher. Physically safe environment: no spills, clutter or unnecessary equipment. Stretcher in lowest position, wheels locked, appropriate side rails in place. Provide visual cue, wrist band, yellow gown, etc. Monitor gait and stability. Monitor for mental status changes and reorient to person, place, and time. Review medications for side effects contributing to fall risk. Reinforce activity limits and safety measures with patient and family. Provide visual clues: red socks.

## 2019-05-16 ENCOUNTER — TRANSCRIPTION ENCOUNTER (OUTPATIENT)
Age: 84
End: 2019-05-16

## 2019-05-16 VITALS
RESPIRATION RATE: 18 BRPM | DIASTOLIC BLOOD PRESSURE: 57 MMHG | SYSTOLIC BLOOD PRESSURE: 144 MMHG | TEMPERATURE: 98 F | HEART RATE: 79 BPM | OXYGEN SATURATION: 95 %

## 2019-05-16 DIAGNOSIS — I48.91 UNSPECIFIED ATRIAL FIBRILLATION: ICD-10-CM

## 2019-05-16 DIAGNOSIS — Z29.9 ENCOUNTER FOR PROPHYLACTIC MEASURES, UNSPECIFIED: ICD-10-CM
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
## 2019-05-16 DIAGNOSIS — N17.9 ACUTE KIDNEY FAILURE, UNSPECIFIED: ICD-10-CM

## 2019-05-16 DIAGNOSIS — S22.39XA FRACTURE OF ONE RIB, UNSPECIFIED SIDE, INITIAL ENCOUNTER FOR CLOSED FRACTURE: ICD-10-CM

## 2019-05-16 DIAGNOSIS — I10 ESSENTIAL (PRIMARY) HYPERTENSION: ICD-10-CM

## 2019-05-16 LAB
24R-OH-CALCIDIOL SERPL-MCNC: 59.4 NG/ML — SIGNIFICANT CHANGE UP (ref 30–80)
ANION GAP SERPL CALC-SCNC: 5 MMOL/L — SIGNIFICANT CHANGE UP (ref 5–17)
BASOPHILS # BLD AUTO: 0.07 K/UL — SIGNIFICANT CHANGE UP (ref 0–0.2)
BASOPHILS NFR BLD AUTO: 0.8 % — SIGNIFICANT CHANGE UP (ref 0–2)
BUN SERPL-MCNC: 26 MG/DL — HIGH (ref 7–18)
CALCIUM SERPL-MCNC: 8.1 MG/DL — LOW (ref 8.4–10.5)
CHLORIDE SERPL-SCNC: 119 MMOL/L — HIGH (ref 96–108)
CHOLEST SERPL-MCNC: 92 MG/DL — SIGNIFICANT CHANGE UP (ref 10–199)
CO2 SERPL-SCNC: 25 MMOL/L — SIGNIFICANT CHANGE UP (ref 22–31)
CREAT SERPL-MCNC: 1.41 MG/DL — HIGH (ref 0.5–1.3)
EOSINOPHIL # BLD AUTO: 0.32 K/UL — SIGNIFICANT CHANGE UP (ref 0–0.5)
EOSINOPHIL NFR BLD AUTO: 3.6 % — SIGNIFICANT CHANGE UP (ref 0–6)
GLUCOSE SERPL-MCNC: 89 MG/DL — SIGNIFICANT CHANGE UP (ref 70–99)
HBA1C BLD-MCNC: 5.3 % — SIGNIFICANT CHANGE UP (ref 4–5.6)
HCT VFR BLD CALC: 38.1 % — SIGNIFICANT CHANGE UP (ref 34.5–45)
HDLC SERPL-MCNC: 69 MG/DL — SIGNIFICANT CHANGE UP
HGB BLD-MCNC: 12.3 G/DL — SIGNIFICANT CHANGE UP (ref 11.5–15.5)
IMM GRANULOCYTES NFR BLD AUTO: 0.2 % — SIGNIFICANT CHANGE UP (ref 0–1.5)
LIPID PNL WITH DIRECT LDL SERPL: 12 MG/DL — SIGNIFICANT CHANGE UP
LYMPHOCYTES # BLD AUTO: 0.86 K/UL — LOW (ref 1–3.3)
LYMPHOCYTES # BLD AUTO: 9.5 % — LOW (ref 13–44)
MAGNESIUM SERPL-MCNC: 2.1 MG/DL — SIGNIFICANT CHANGE UP (ref 1.6–2.6)
MCHC RBC-ENTMCNC: 29.8 PG — SIGNIFICANT CHANGE UP (ref 27–34)
MCHC RBC-ENTMCNC: 32.3 GM/DL — SIGNIFICANT CHANGE UP (ref 32–36)
MCV RBC AUTO: 92.3 FL — SIGNIFICANT CHANGE UP (ref 80–100)
MONOCYTES # BLD AUTO: 1.04 K/UL — HIGH (ref 0–0.9)
MONOCYTES NFR BLD AUTO: 11.5 % — SIGNIFICANT CHANGE UP (ref 2–14)
NEUTROPHILS # BLD AUTO: 6.7 K/UL — SIGNIFICANT CHANGE UP (ref 1.8–7.4)
NEUTROPHILS NFR BLD AUTO: 74.4 % — SIGNIFICANT CHANGE UP (ref 43–77)
NRBC # BLD: 0 /100 WBCS — SIGNIFICANT CHANGE UP (ref 0–0)
PHOSPHATE SERPL-MCNC: 3.1 MG/DL — SIGNIFICANT CHANGE UP (ref 2.5–4.5)
PLATELET # BLD AUTO: 187 K/UL — SIGNIFICANT CHANGE UP (ref 150–400)
POTASSIUM SERPL-MCNC: 4.8 MMOL/L — SIGNIFICANT CHANGE UP (ref 3.5–5.3)
POTASSIUM SERPL-SCNC: 4.8 MMOL/L — SIGNIFICANT CHANGE UP (ref 3.5–5.3)
RBC # BLD: 4.13 M/UL — SIGNIFICANT CHANGE UP (ref 3.8–5.2)
RBC # FLD: 14.9 % — HIGH (ref 10.3–14.5)
SODIUM SERPL-SCNC: 149 MMOL/L — HIGH (ref 135–145)
TOTAL CHOLESTEROL/HDL RATIO MEASUREMENT: 1.3 RATIO — LOW (ref 3.3–7.1)
TRIGL SERPL-MCNC: 53 MG/DL — SIGNIFICANT CHANGE UP (ref 10–149)
TSH SERPL-MCNC: 1.02 UU/ML — SIGNIFICANT CHANGE UP (ref 0.34–4.82)
VIT B12 SERPL-MCNC: 342 PG/ML — SIGNIFICANT CHANGE UP (ref 232–1245)
WBC # BLD: 9.01 K/UL — SIGNIFICANT CHANGE UP (ref 3.8–10.5)
WBC # FLD AUTO: 9.01 K/UL — SIGNIFICANT CHANGE UP (ref 3.8–10.5)

## 2019-05-16 PROCEDURE — 96375 TX/PRO/DX INJ NEW DRUG ADDON: CPT

## 2019-05-16 PROCEDURE — 36415 COLL VENOUS BLD VENIPUNCTURE: CPT

## 2019-05-16 PROCEDURE — 85027 COMPLETE CBC AUTOMATED: CPT

## 2019-05-16 PROCEDURE — 97161 PT EVAL LOW COMPLEX 20 MIN: CPT

## 2019-05-16 PROCEDURE — 96374 THER/PROPH/DIAG INJ IV PUSH: CPT

## 2019-05-16 PROCEDURE — 84100 ASSAY OF PHOSPHORUS: CPT

## 2019-05-16 PROCEDURE — 80061 LIPID PANEL: CPT

## 2019-05-16 PROCEDURE — G0378: CPT

## 2019-05-16 PROCEDURE — 83735 ASSAY OF MAGNESIUM: CPT

## 2019-05-16 PROCEDURE — 82550 ASSAY OF CK (CPK): CPT

## 2019-05-16 PROCEDURE — 83036 HEMOGLOBIN GLYCOSYLATED A1C: CPT

## 2019-05-16 PROCEDURE — 93005 ELECTROCARDIOGRAM TRACING: CPT

## 2019-05-16 PROCEDURE — 99285 EMERGENCY DEPT VISIT HI MDM: CPT | Mod: 25

## 2019-05-16 PROCEDURE — 80053 COMPREHEN METABOLIC PANEL: CPT

## 2019-05-16 PROCEDURE — 99283 EMERGENCY DEPT VISIT LOW MDM: CPT

## 2019-05-16 PROCEDURE — 84443 ASSAY THYROID STIM HORMONE: CPT

## 2019-05-16 PROCEDURE — 71250 CT THORAX DX C-: CPT

## 2019-05-16 PROCEDURE — 80048 BASIC METABOLIC PNL TOTAL CA: CPT

## 2019-05-16 PROCEDURE — 82306 VITAMIN D 25 HYDROXY: CPT

## 2019-05-16 PROCEDURE — 82607 VITAMIN B-12: CPT

## 2019-05-16 RX ORDER — OXYCODONE AND ACETAMINOPHEN 5; 325 MG/1; MG/1
1 TABLET ORAL EVERY 6 HOURS
Refills: 0 | Status: DISCONTINUED | OUTPATIENT
Start: 2019-05-16 | End: 2019-05-16

## 2019-05-16 RX ORDER — SODIUM CHLORIDE 9 MG/ML
1000 INJECTION, SOLUTION INTRAVENOUS
Refills: 0 | Status: DISCONTINUED | OUTPATIENT
Start: 2019-05-16 | End: 2019-05-16

## 2019-05-16 RX ORDER — SODIUM CHLORIDE 9 MG/ML
1000 INJECTION INTRAMUSCULAR; INTRAVENOUS; SUBCUTANEOUS
Refills: 0 | Status: DISCONTINUED | OUTPATIENT
Start: 2019-05-16 | End: 2019-05-16

## 2019-05-16 RX ORDER — ATORVASTATIN CALCIUM 80 MG/1
40 TABLET, FILM COATED ORAL AT BEDTIME
Refills: 0 | Status: DISCONTINUED | OUTPATIENT
Start: 2019-05-16 | End: 2019-05-16

## 2019-05-16 RX ORDER — HEPARIN SODIUM 5000 [USP'U]/ML
5000 INJECTION INTRAVENOUS; SUBCUTANEOUS EVERY 8 HOURS
Refills: 0 | Status: DISCONTINUED | OUTPATIENT
Start: 2019-05-16 | End: 2019-05-16

## 2019-05-16 RX ORDER — ASPIRIN/CALCIUM CARB/MAGNESIUM 324 MG
81 TABLET ORAL DAILY
Refills: 0 | Status: DISCONTINUED | OUTPATIENT
Start: 2019-05-16 | End: 2019-05-16

## 2019-05-16 RX ORDER — AMLODIPINE BESYLATE 2.5 MG/1
10 TABLET ORAL DAILY
Refills: 0 | Status: DISCONTINUED | OUTPATIENT
Start: 2019-05-16 | End: 2019-05-16

## 2019-05-16 RX ORDER — LOSARTAN POTASSIUM 100 MG/1
50 TABLET, FILM COATED ORAL
Qty: 0 | Refills: 0 | DISCHARGE

## 2019-05-16 RX ORDER — ACETAMINOPHEN 500 MG
2 TABLET ORAL
Qty: 0 | Refills: 0 | DISCHARGE
Start: 2019-05-16

## 2019-05-16 RX ORDER — LIDOCAINE 4 G/100G
1 CREAM TOPICAL
Qty: 14 | Refills: 0
Start: 2019-05-16 | End: 2019-05-29

## 2019-05-16 RX ORDER — PENTOXIFYLLINE 400 MG
400 TABLET, EXTENDED RELEASE ORAL THREE TIMES A DAY
Refills: 0 | Status: DISCONTINUED | OUTPATIENT
Start: 2019-05-16 | End: 2019-05-16

## 2019-05-16 RX ORDER — ACETAMINOPHEN 500 MG
650 TABLET ORAL EVERY 6 HOURS
Refills: 0 | Status: DISCONTINUED | OUTPATIENT
Start: 2019-05-16 | End: 2019-05-16

## 2019-05-16 RX ADMIN — LIDOCAINE 1 PATCH: 4 CREAM TOPICAL at 11:57

## 2019-05-16 RX ADMIN — HEPARIN SODIUM 5000 UNIT(S): 5000 INJECTION INTRAVENOUS; SUBCUTANEOUS at 06:55

## 2019-05-16 RX ADMIN — LIDOCAINE 1 PATCH: 4 CREAM TOPICAL at 11:59

## 2019-05-16 RX ADMIN — OXYCODONE AND ACETAMINOPHEN 1 TABLET(S): 5; 325 TABLET ORAL at 08:18

## 2019-05-16 RX ADMIN — OXYCODONE AND ACETAMINOPHEN 1 TABLET(S): 5; 325 TABLET ORAL at 07:00

## 2019-05-16 RX ADMIN — LIDOCAINE 1 PATCH: 4 CREAM TOPICAL at 19:05

## 2019-05-16 RX ADMIN — Medication 400 MILLIGRAM(S): at 17:05

## 2019-05-16 RX ADMIN — MORPHINE SULFATE 2 MILLIGRAM(S): 50 CAPSULE, EXTENDED RELEASE ORAL at 01:00

## 2019-05-16 RX ADMIN — SODIUM CHLORIDE 70 MILLILITER(S): 9 INJECTION, SOLUTION INTRAVENOUS at 11:55

## 2019-05-16 RX ADMIN — Medication 400 MILLIGRAM(S): at 08:42

## 2019-05-16 RX ADMIN — LIDOCAINE 1 PATCH: 4 CREAM TOPICAL at 07:11

## 2019-05-16 RX ADMIN — Medication 81 MILLIGRAM(S): at 11:56

## 2019-05-16 RX ADMIN — HEPARIN SODIUM 5000 UNIT(S): 5000 INJECTION INTRAVENOUS; SUBCUTANEOUS at 17:05

## 2019-05-16 RX ADMIN — AMLODIPINE BESYLATE 10 MILLIGRAM(S): 2.5 TABLET ORAL at 06:55

## 2019-05-16 NOTE — DISCHARGE NOTE NURSING/CASE MANAGEMENT/SOCIAL WORK - NSDCDPATPORTLINK_GEN_ALL_CORE
You can access the [facility] Patient Portal, offered by [facility], by registering with the following website: [email or web address]

## 2019-05-16 NOTE — PHYSICAL THERAPY INITIAL EVALUATION ADULT - ACTIVE RANGE OF MOTION EXAMINATION, REHAB EVAL
Right UE Active ROM was WFL (within functional limits)/bilateral  lower extremity Active ROM was WFL (within functional limits)

## 2019-05-16 NOTE — PROGRESS NOTE ADULT - ASSESSMENT
89 years old female from home, who lives alone, walks with a stick,  with PMHx of atrial fibrillation (Not on NOAC for GI bleed), HTN, breat cancer, Pacemaker presented to ED with back pain s/p fall 3 days ago. patient stepped on her step ladder  to clean her windows on Sunday. pt lost balance & fell on her Lt side, pt broke her coffee table. Patient denies any room spinning sensation, lightheadedness or dizziness.  Pt was able to get up immediately.  pt developed Lt sided back pain, worse since last night,it was 10/10 in intensity, localized to left upper chest, aggravated by movement or deep breathing.   Pt went to see PMD, today, sent to ED for evaluation, no head injury.  Pt took Tylenol with no relief.  Pt denies any fever, chest pain, dyspnea, abdominal pain, vomiting, diarrhea , change in urinary or bowel habits.     Problem/Plan - 1:  ·  Problem: Rib fracture.  Plan: pt presented with mechanical fall by slipping on sunday.  Pain was initially bearable but yesterday it become 10/10 and aggravated by breathing.  Patient has history of A fib but is not on any blood thinner.  Pain is localized to left upper back.  CT scan showed minimally displaced fracture of posterior 9 and 10th ribs.  Pain control with percocet and Tylenol.  Incentive spirometry.  PT evaluation.   thoracic surg eval noted  no sx intervention      Problem/Plan - 2:  ·  Problem: Fall.  Plan: pt presented with mechanical fall by slipping on sunday.  Patient has history of A fib but is not on any blood thinner.  fall is purely mechanical and pt denies any dizziness, lightheadedness or any room spinning sensation.  PT evaluation.  Case management. pt lives alone.      Problem/Plan - 3:  ·  Problem: Acute kidney injury.  Plan: pt presented with BATSHEVA with elevated creatinine to 1.49. baseline 2 years ago is 1.20.  Will give gentle hydration.  f/u BMP.      Problem/Plan - 4:  ·  Problem: HTN (hypertension).  Plan: Pt has history of HTN for which she takes amlodipine 10 mg at home.  Will continue that.  DASH TLC diet.      Problem/Plan - 5:  ·  Problem: Atrial fibrillation.  Plan: pt has history of atrial fibrillation but NOAC was stopped with concern for GI bleed.  Rate is controlled.     dc planning pending PT eval

## 2019-05-16 NOTE — PHYSICAL THERAPY INITIAL EVALUATION ADULT - IMPAIRMENTS FOUND, PT EVAL
muscle strength/gait, locomotion, and balance/aerobic capacity/endurance/gross motor/ROM/joint integrity and mobility

## 2019-05-16 NOTE — CONSULT NOTE ADULT - ASSESSMENT
89 y/o female w/ Left-sided rib fractures involving the eighth, ninth and tenth ribs, no pneumothorax.    -No acute thoracic surgery intervention at this time    -Pain management eval   -Incentive spirometry   -Care as per medical team  -D/w Dr Blum and agrees

## 2019-05-16 NOTE — DISCHARGE NOTE PROVIDER - CARE PROVIDER_API CALL
Tim Cool (DO)  Family Medicine  [address] [place] [address]  Phone: [phone]  Fax: [phone]  Follow Up Time: 1 week

## 2019-05-16 NOTE — CONSULT NOTE ADULT - ATTENDING COMMENTS
patient s/p fall with minimally displaced fracture on left posterior ribs. no surgical indication for plating. recommend pain control no hemothorax/effusion noted.

## 2019-05-16 NOTE — CONSULT NOTE ADULT - SUBJECTIVE AND OBJECTIVE BOX
Attending: Dr Blum     HPI:  89 years old female from home, who lives alone, walks with a stick, with PMHx of atrial fibrillation (Not on NOAC for GI bleed), HTN, breat cancer, Pacemaker presented to ED with back pain s/p fall 3 days ago. patient stepped on her step ladder  to clean her windows on Sunday. pt lost balance & fell on her Lt side, pt broke her coffee table. Patient denies any room spinning sensation, lightheadedness or dizziness.  Pt was able to get up immediately.  pt developed Lt sided back pain, worse since last night,it was 10/10 in intensity, localized to left upper chest, aggravated by movement or deep breathing.   Pt went to see PMD, today, sent to ED for evaluation, no head injury.  Pt took Tylenol with no relief.  Pt denies any fever, chest pain, dyspnea, abdominal pain, vomiting, diarrhea , change in urinary or bowel habits. (15 May 2019 22:55)    Thoracic surgery consulted re rib fractures as seen on CT chest. Pt seen and examined at bedside, admits to min left sided chest pain, denies any difficulty breathing, no SOB, offers no other complaints.     PAST MEDICAL & SURGICAL HISTORY:  Pacemaker  Colon cancer  Breast cancer  HTN (hypertension)  Renal insufficiency  Atrial fibrillation  S/P hysterectomy  S/P colon resection: 1993 for colon ca.  S/P mastectomy: 1990  S/P peripheral artery bypass: in 1994      MEDICATIONS  (STANDING):  amLODIPine   Tablet 10 milliGRAM(s) Oral daily  aspirin enteric coated 81 milliGRAM(s) Oral daily  atorvastatin 40 milliGRAM(s) Oral at bedtime  heparin  Injectable 5000 Unit(s) SubCutaneous every 8 hours  lidocaine   Patch 1 Patch Transdermal daily  pentoxifylline 400 milliGRAM(s) Oral three times a day  sodium chloride 0.45%. 1000 milliLiter(s) (70 mL/Hr) IV Continuous <Continuous>    MEDICATIONS  (PRN):  acetaminophen   Tablet .. 650 milliGRAM(s) Oral every 6 hours PRN Temp greater or equal to 38C (100.4F), Mild Pain (1 - 3), Moderate Pain (4 - 6)  oxyCODONE    5 mG/acetaminophen 325 mG 1 Tablet(s) Oral every 6 hours PRN Severe Pain (7 - 10)      Allergies    No Known Allergies    Intolerances        SOCIAL HISTORY:  Unknown   FAMILY HISTORY:  No pertinent family history in first degree relatives      Vital Signs Last 24 Hrs  T(C): 36.4 (16 May 2019 11:24), Max: 37.2 (15 May 2019 16:03)  T(F): 97.5 (16 May 2019 11:24), Max: 99 (15 May 2019 16:03)  HR: 61 (16 May 2019 11:24) (57 - 77)  BP: 128/59 (16 May 2019 11:24) (128/59 - 168/74)  BP(mean): --  RR: 17 (16 May 2019 11:24) (17 - 18)  SpO2: 94% (16 May 2019 11:24) (93% - 98%)    I&O's Summary      LABS:                        12.3   9.01  )-----------( 187      ( 16 May 2019 06:38 )             38.1     05-16    149<H>  |  119<H>  |  26<H>  ----------------------------<  89  4.8   |  25  |  1.41<H>    Ca    8.1<L>      16 May 2019 06:38  Phos  3.1     05-16  Mg     2.1     05-16    TPro  7.1  /  Alb  3.6  /  TBili  0.8  /  DBili  x   /  AST  25  /  ALT  22  /  AlkPhos  64  05-15        CAPILLARY BLOOD GLUCOSE        LIVER FUNCTIONS - ( 15 May 2019 15:18 )  Alb: 3.6 g/dL / Pro: 7.1 g/dL / ALK PHOS: 64 U/L / ALT: 22 U/L DA / AST: 25 U/L / GGT: x             RADIOLOGY & ADDITIONAL STUDIES:  < from: CT Chest No Cont (05.15.19 @ 15:48) >  FINDINGS:    CHEST:     LUNGS AND LARGE AIRWAYS: Patent central airways.  Bibasilar atelectasis.   No pneumothorax. A stable 6 mm nodular density is noted in the right   middle lobe (2-80).  PLEURA: No pleural effusion.  VESSELS: Coronary artery atherosclerotic calcification.  HEART: Cardiomegaly.. No pericardial effusion. Pacemaker lead in the   right ventricle.  MEDIASTINUM AND MACY: No lymphadenopathy.  CHEST WALL AND LOWER NECK: Within normal limits.  VISUALIZED UPPER ABDOMEN: Partially imaged cholelithiasis.   Atherosclerotic calcification involving the aorta and its branch vessels.   Nonobstructing 3 mm calculus in the left renal pelvis.  BONES: Minimally displaced fracture involving the posterior left 10th   rib. Minimally displaced fracture involving the posterior left ninth rib   with a questionable nondisplaced/buckle fracture involving the lateral   ninth rib. Minimally displaced acute versus subacute fracture involving   the left anterolateral eighth rib.    IMPRESSION:   Left-sided rib fractures involving the eighth, ninth and tenth ribs, as   above.  No pneumothorax.    < end of copied text >

## 2019-05-16 NOTE — DISCHARGE NOTE PROVIDER - HOSPITAL COURSE
HPI_-89 years old female from home, who lives alone, walks with a stick,  with PMHx of atrial fibrillation (Not on NOAC for GI bleed), HTN, breat cancer, Pacemaker presented to ED with back pain s/p fall 3 days ago. patient stepped on her step ladder  to clean her windows on Sunday. pt lost balance & fell on her Lt side, pt broke her coffee table. Patient denies any room spinning sensation, lightheadedness or dizziness.  Pt was able to get up immediately.  pt developed Lt sided back pain, worse since last night,it was 10/10 in intensity, localized to left upper chest, aggravated by movement or deep breathing.   Pt went to see PMD, today, sent to ED for evaluation, no head injury.  Pt took Tylenol with no relief.    Pt denies any fever, chest pain, dyspnea, abdominal pain, vomiting, diarrhea , change in urinary or bowel habits     Rib fracture- Plan: pt presented with mechanical fall by slipping on sunday, Pain was initially bearable but yesterday it become 10/10 and aggravated by breathing. Patient has history of A fib but is not on any blood thinner. Pain is localized to left upper back.    -CT scan showed minimally displaced fracture of posterior 9 and 10th ribs.    Pain control with Tylenol and Lidoderm patch, seen by thoracic surgery, no interventions indicated. encourage Incentive spirometry. seen by PT, pt to be discharged home with home PT

## 2019-05-16 NOTE — DISCHARGE NOTE PROVIDER - NSDCCPCAREPLAN_GEN_ALL_CORE_FT
PRINCIPAL DISCHARGE DIAGNOSIS  Diagnosis: Rib fracture  Assessment and Plan of Treatment: you were admitted for after having fall at home, CT of your chest showed that you have 8th, 9th and 10th rib fracture.  your pain was under control with tylenol and Lidocaine patch. you were seen by thoracic surgeon, no surgery is needed at this time.  Do not do any heavy lifting or streneous household work.   -follow up with PCP within 1 week      SECONDARY DISCHARGE DIAGNOSES  Diagnosis: Renal insufficiency  Assessment and Plan of Treatment:

## 2019-05-16 NOTE — PROGRESS NOTE ADULT - SUBJECTIVE AND OBJECTIVE BOX
ROGELIO DURBIN  89y Female  MRN:[id number]    Patient is a 89y old  Female who presents with a chief complaint of s/p fall and back pain (16 May 2019 12:46)    HPI:  89 years old female from home, who lives alone, walks with a stick,  with PMHx of atrial fibrillation (Not on NOAC for GI bleed), HTN, breat cancer, Pacemaker presented to ED with back pain s/p fall 3 days ago. patient stepped on her step ladder  to clean her windows on Sunday. pt lost balance & fell on her Lt side, pt broke her coffee table. Patient denies any room spinning sensation, lightheadedness or dizziness.  Pt was able to get up immediately.  pt developed Lt sided back pain, worse since last night,it was 10/10 in intensity, localized to left upper chest, aggravated by movement or deep breathing.   Pt went to see PMD, today, sent to ED for evaluation, no head injury.  Pt took Tylenol with no relief.  Pt denies any fever, chest pain, dyspnea, abdominal pain, vomiting, diarrhea , change in urinary or bowel habits. (15 May 2019 22:55)      Patient seen and evaluated at bedside. No acute events overnight except as noted.    Interval HPI: feels well. no c/o    PAST MEDICAL & SURGICAL HISTORY:  Pacemaker  Colon cancer  Breast cancer  HTN (hypertension)  Renal insufficiency  Atrial fibrillation  S/P hysterectomy  S/P colon resection: 1993 for colon ca.  S/P mastectomy: 1990  S/P peripheral artery bypass: in 1994      REVIEW OF SYSTEMS:  as per hpi    VITALS:  Vital Signs Last 24 Hrs  T(C): 36.4 (16 May 2019 11:24), Max: 37.2 (15 May 2019 16:03)  T(F): 97.5 (16 May 2019 11:24), Max: 99 (15 May 2019 16:03)  HR: 61 (16 May 2019 11:24) (57 - 77)  BP: 128/59 (16 May 2019 11:24) (128/59 - 168/74)  BP(mean): --  RR: 17 (16 May 2019 11:24) (17 - 18)  SpO2: 94% (16 May 2019 11:24) (93% - 98%)  CAPILLARY BLOOD GLUCOSE        I&O's Summary      PHYSICAL EXAM:  GENERAL: NAD, well-developed  HEAD:  Atraumatic, Normocephalic  EYES: EOMI, PERRLA, conjunctiva and sclera clear  NECK: Supple, No JVD  CHEST/LUNG: Clear to auscultation bilaterally; No wheeze  HEART: S1, S2; No murmurs, rubs, or gallops  ABDOMEN: Soft, Nontender, Nondistended; Bowel sounds present  EXTREMITIES:  2+ Peripheral Pulses, No clubbing, cyanosis, or edema  PSYCH: Normal affect  NEUROLOGY: AAOX3; non-focal  SKIN: No rashes or lesions    Consultant(s) Notes Reviewed:  [x ] YES  [ ] NO  Care Discussed with Consultants/Other Providers [ x] YES  [ ] NO    MEDS:  MEDICATIONS  (STANDING):  amLODIPine   Tablet 10 milliGRAM(s) Oral daily  aspirin enteric coated 81 milliGRAM(s) Oral daily  atorvastatin 40 milliGRAM(s) Oral at bedtime  heparin  Injectable 5000 Unit(s) SubCutaneous every 8 hours  lidocaine   Patch 1 Patch Transdermal daily  pentoxifylline 400 milliGRAM(s) Oral three times a day  sodium chloride 0.45%. 1000 milliLiter(s) (70 mL/Hr) IV Continuous <Continuous>    MEDICATIONS  (PRN):  acetaminophen   Tablet .. 650 milliGRAM(s) Oral every 6 hours PRN Temp greater or equal to 38C (100.4F), Mild Pain (1 - 3), Moderate Pain (4 - 6)  oxyCODONE    5 mG/acetaminophen 325 mG 1 Tablet(s) Oral every 6 hours PRN Severe Pain (7 - 10)    ALLERGIES:  No Known Allergies      LABS:                        12.3   9.01  )-----------( 187      ( 16 May 2019 06:38 )             38.1     05-16    149<H>  |  119<H>  |  26<H>  ----------------------------<  89  4.8   |  25  |  1.41<H>    Ca    8.1<L>      16 May 2019 06:38  Phos  3.1     05-16  Mg     2.1     05-16    TPro  7.1  /  Alb  3.6  /  TBili  0.8  /  DBili  x   /  AST  25  /  ALT  22  /  AlkPhos  64  05-15      CARDIAC MARKERS ( 15 May 2019 15:18 )  x     / x     / 144 U/L / x     / x          LIVER FUNCTIONS - ( 15 May 2019 15:18 )  Alb: 3.6 g/dL / Pro: 7.1 g/dL / ALK PHOS: 64 U/L / ALT: 22 U/L DA / AST: 25 U/L / GGT: x             TSH: Thyroid Stimulating Hormone, Serum: 1.02 uU/mL (05-16 @ 06:38)    A1c:Hemoglobin A1C, Whole Blood: 5.3 % (05-16 @ 10:17)     < from: CT Chest No Cont (05.15.19 @ 15:48) >    IMPRESSION:   Left-sided rib fractures involving the eighth, ninth and tenth ribs, as   above.  No pneumothorax.    < end of copied text >

## 2019-05-16 NOTE — PHYSICAL THERAPY INITIAL EVALUATION ADULT - GENERAL OBSERVATIONS, REHAB EVAL
89 y F, ED hold referral, s/p fall rib fx(s), aox4 pleasant, good social support from neighboring friends, fairly safe independent transfers short distant locomotion and long distant rw ambulation

## 2019-07-08 ENCOUNTER — APPOINTMENT (OUTPATIENT)
Dept: ELECTROPHYSIOLOGY | Facility: CLINIC | Age: 84
End: 2019-07-08
Payer: MEDICARE

## 2019-07-08 PROCEDURE — 93296 REM INTERROG EVL PM/IDS: CPT

## 2019-07-08 PROCEDURE — 93294 REM INTERROG EVL PM/LDLS PM: CPT

## 2019-08-05 ENCOUNTER — EMERGENCY (EMERGENCY)
Facility: HOSPITAL | Age: 84
LOS: 1 days | Discharge: HOME | End: 2019-08-05
Attending: EMERGENCY MEDICINE | Admitting: EMERGENCY MEDICINE
Payer: MEDICARE

## 2019-08-05 VITALS
HEART RATE: 92 BPM | OXYGEN SATURATION: 97 % | RESPIRATION RATE: 16 BRPM | DIASTOLIC BLOOD PRESSURE: 73 MMHG | SYSTOLIC BLOOD PRESSURE: 127 MMHG | TEMPERATURE: 98 F

## 2019-08-05 LAB
ALBUMIN SERPL ELPH-MCNC: 3.7 G/DL — SIGNIFICANT CHANGE UP (ref 3.3–5)
ALP SERPL-CCNC: 53 U/L — SIGNIFICANT CHANGE UP (ref 40–120)
ALT FLD-CCNC: 8 U/L — SIGNIFICANT CHANGE UP (ref 4–33)
ANION GAP SERPL CALC-SCNC: 14 MMO/L — SIGNIFICANT CHANGE UP (ref 7–14)
APPEARANCE UR: CLEAR — SIGNIFICANT CHANGE UP
APTT BLD: 28.2 SEC — SIGNIFICANT CHANGE UP (ref 27.5–36.3)
AST SERPL-CCNC: 9 U/L — SIGNIFICANT CHANGE UP (ref 4–32)
BACTERIA # UR AUTO: SIGNIFICANT CHANGE UP
BASOPHILS # BLD AUTO: 0.08 K/UL — SIGNIFICANT CHANGE UP (ref 0–0.2)
BASOPHILS NFR BLD AUTO: 0.7 % — SIGNIFICANT CHANGE UP (ref 0–2)
BILIRUB SERPL-MCNC: 0.4 MG/DL — SIGNIFICANT CHANGE UP (ref 0.2–1.2)
BILIRUB UR-MCNC: NEGATIVE — SIGNIFICANT CHANGE UP
BLD GP AB SCN SERPL QL: NEGATIVE — SIGNIFICANT CHANGE UP
BLOOD UR QL VISUAL: NEGATIVE — SIGNIFICANT CHANGE UP
BUN SERPL-MCNC: 44 MG/DL — HIGH (ref 7–23)
CALCIUM SERPL-MCNC: 9.7 MG/DL — SIGNIFICANT CHANGE UP (ref 8.4–10.5)
CHLORIDE SERPL-SCNC: 107 MMOL/L — SIGNIFICANT CHANGE UP (ref 98–107)
CO2 SERPL-SCNC: 25 MMOL/L — SIGNIFICANT CHANGE UP (ref 22–31)
COLOR SPEC: YELLOW — SIGNIFICANT CHANGE UP
CREAT SERPL-MCNC: 1.28 MG/DL — SIGNIFICANT CHANGE UP (ref 0.5–1.3)
EOSINOPHIL # BLD AUTO: 0.08 K/UL — SIGNIFICANT CHANGE UP (ref 0–0.5)
EOSINOPHIL NFR BLD AUTO: 0.7 % — SIGNIFICANT CHANGE UP (ref 0–6)
EPI CELLS # UR: SIGNIFICANT CHANGE UP
GLUCOSE SERPL-MCNC: 134 MG/DL — HIGH (ref 70–99)
GLUCOSE UR-MCNC: NEGATIVE — SIGNIFICANT CHANGE UP
HCT VFR BLD CALC: 30.9 % — LOW (ref 34.5–45)
HGB BLD-MCNC: 10.3 G/DL — LOW (ref 11.5–15.5)
IMM GRANULOCYTES NFR BLD AUTO: 0.6 % — SIGNIFICANT CHANGE UP (ref 0–1.5)
INR BLD: 1.16 — SIGNIFICANT CHANGE UP (ref 0.88–1.17)
KETONES UR-MCNC: NEGATIVE — SIGNIFICANT CHANGE UP
LEUKOCYTE ESTERASE UR-ACNC: SIGNIFICANT CHANGE UP
LIDOCAIN IGE QN: 66.2 U/L — HIGH (ref 7–60)
LYMPHOCYTES # BLD AUTO: 1.12 K/UL — SIGNIFICANT CHANGE UP (ref 1–3.3)
LYMPHOCYTES # BLD AUTO: 10.1 % — LOW (ref 13–44)
MCHC RBC-ENTMCNC: 29.6 PG — SIGNIFICANT CHANGE UP (ref 27–34)
MCHC RBC-ENTMCNC: 33.3 % — SIGNIFICANT CHANGE UP (ref 32–36)
MCV RBC AUTO: 88.8 FL — SIGNIFICANT CHANGE UP (ref 80–100)
MONOCYTES # BLD AUTO: 0.84 K/UL — SIGNIFICANT CHANGE UP (ref 0–0.9)
MONOCYTES NFR BLD AUTO: 7.6 % — SIGNIFICANT CHANGE UP (ref 2–14)
NEUTROPHILS # BLD AUTO: 8.92 K/UL — HIGH (ref 1.8–7.4)
NEUTROPHILS NFR BLD AUTO: 80.3 % — HIGH (ref 43–77)
NITRITE UR-MCNC: NEGATIVE — SIGNIFICANT CHANGE UP
NRBC # FLD: 0 K/UL — SIGNIFICANT CHANGE UP (ref 0–0)
PH UR: 6 — SIGNIFICANT CHANGE UP (ref 5–8)
PLATELET # BLD AUTO: 355 K/UL — SIGNIFICANT CHANGE UP (ref 150–400)
PMV BLD: 10.6 FL — SIGNIFICANT CHANGE UP (ref 7–13)
POTASSIUM SERPL-MCNC: 3.8 MMOL/L — SIGNIFICANT CHANGE UP (ref 3.5–5.3)
POTASSIUM SERPL-SCNC: 3.8 MMOL/L — SIGNIFICANT CHANGE UP (ref 3.5–5.3)
PROT SERPL-MCNC: 6.6 G/DL — SIGNIFICANT CHANGE UP (ref 6–8.3)
PROT UR-MCNC: 20 — SIGNIFICANT CHANGE UP
PROTHROM AB SERPL-ACNC: 13.3 SEC — HIGH (ref 9.8–13.1)
RBC # BLD: 3.48 M/UL — LOW (ref 3.8–5.2)
RBC # FLD: 13.5 % — SIGNIFICANT CHANGE UP (ref 10.3–14.5)
RBC CASTS # UR COMP ASSIST: SIGNIFICANT CHANGE UP (ref 0–?)
RH IG SCN BLD-IMP: POSITIVE — SIGNIFICANT CHANGE UP
SODIUM SERPL-SCNC: 146 MMOL/L — HIGH (ref 135–145)
SP GR SPEC: 1.02 — SIGNIFICANT CHANGE UP (ref 1–1.04)
UROBILINOGEN FLD QL: NORMAL — SIGNIFICANT CHANGE UP
WBC # BLD: 11.11 K/UL — HIGH (ref 3.8–10.5)
WBC # FLD AUTO: 11.11 K/UL — HIGH (ref 3.8–10.5)
WBC UR QL: HIGH (ref 0–?)

## 2019-08-05 PROCEDURE — 99284 EMERGENCY DEPT VISIT MOD MDM: CPT

## 2019-08-05 PROCEDURE — 76705 ECHO EXAM OF ABDOMEN: CPT | Mod: 26

## 2019-08-05 PROCEDURE — 74177 CT ABD & PELVIS W/CONTRAST: CPT | Mod: 26

## 2019-08-05 RX ORDER — FAMOTIDINE 10 MG/ML
20 INJECTION INTRAVENOUS ONCE
Refills: 0 | Status: COMPLETED | OUTPATIENT
Start: 2019-08-05 | End: 2019-08-05

## 2019-08-05 RX ADMIN — FAMOTIDINE 20 MILLIGRAM(S): 10 INJECTION INTRAVENOUS at 19:50

## 2019-08-05 NOTE — ED PROVIDER NOTE - ABDOMINAL EXAM
soft/no pulsating masses/MCBURNEY'S POINT TENDERNESS/no guarding, no rigidity/tender.../nondistended/no organomegaly

## 2019-08-05 NOTE — ED ADULT NURSE REASSESSMENT NOTE - NS ED NURSE REASSESS COMMENT FT1
Pt resting comfortably on stretcher, accompanied by family, a&ox4, cheerful, pleasant, calm and cooperative. Pt denies chest pain, sob, n/v/d. respirations even and unlabored, NAD at this time. will continue to monitor

## 2019-08-05 NOTE — ED PROVIDER NOTE - CLINICAL SUMMARY MEDICAL DECISION MAKING FREE TEXT BOX
Pt is a 89 y/o F PMHx afib (no anticoagulation), HTN, breast cancer, colon cancer w/ h/o colon resection p/w abdominal pain x 2 weeks -- possible cholecystitis, possible biliary colic, possible pancreatitis -- labs, lipase, ua, ucx, RUQ sono

## 2019-08-05 NOTE — ED PROVIDER NOTE - ATTENDING CONTRIBUTION TO CARE
I performed a face to face bedside interview with patient regarding history of present illness, review of symptoms and past medical history. I completed an independent physical exam.  I have discussed patient's plan of care.   I agree with note as stated above, having amended the EMR as needed to reflect my findings. I have discussed the assessment and plan of care.  This includes during the time I functioned as the attending physician for this patient.  Attending Contribution to Care: agree with plan of Pa. ptp/w r flank pain, n/v, with inability to tolerate PO. writhing in pain. +epigastric pain. will r/o thong, vs other intraabdominal pathology.

## 2019-08-05 NOTE — ED PROVIDER NOTE - NSFOLLOWUPCLINICS_GEN_ALL_ED_FT
[facility]  Gastroenterology  [address] [place] [id number]  Phone: [phone]  Fax:   Follow Up Time:

## 2019-08-05 NOTE — ED PROVIDER NOTE - NSFOLLOWUPINSTRUCTIONS_ED_ALL_ED_FT
You were seen at [facility] ER for abdominal pain. You had lab work which a very small amount of anemia (hemoglobin of 10.3), you had an ultrasound of your gallbladder showing gallstones, and a CT scan of the abdomen showing possible inflammation of the small bowel.     You were given Antibiotics and are to continue these antibiotics as sent to your pharmacy.     You are also to follow-up with a Gastroenterologist in the next 1-2 weeks to discuss the need for an Endoscopy or Colonoscopy.     You should also continue to maintain your hydration as your sodium level was slightly high (146) and you should see you primary doctor in the next week.     Please return for any worsening abdominal pain, vomiting especially with blood, blood in your stools, inability to maintain your hydration due to persistent vomiting, fevers of more than 100.4F, or any other concerns.

## 2019-08-05 NOTE — ED ADULT NURSE NOTE - OBJECTIVE STATEMENT
Pt is a 89 y/o F PMHx afib HTN, breast cancer, colon cancer w/ h/o colon resection p/w abdominal pain x 2 weeks.  Pt notes intermittent RUQ pain, sharp at times triggered with PO intake and associated with nausea and vomiting.  Pt notes sometimes radiates to right flank and right shoulder.  Pt notes pain worsening and more constant for past 5 days with which pt has not been able to keep down any solid food with which pt has not had any bowel movements for past 4-5 days, but notes continues to pass gas Alert and oriented x3   PIV placed labs drawn and sent pt to U/S @ 1900

## 2019-08-05 NOTE — ED PROVIDER NOTE - OBJECTIVE STATEMENT
Pt is a 89 y/o F PMHx afib (no anticoagulation), HTN, breast cancer, colon cancer w/ h/o colon resection p/w abdominal pain x 2 weeks.  Pt notes intermittent RUQ pain, sharp at times triggered with PO intake and associated with nausea and vomiting.  Pt notes sometimes radiates to right flank and right shoulder.  Pt notes pain worsening and more constant for past 5 days with which pt has not been able to keep down any solid food with which pt has not had any bowel movements for past 4-5 days, but notes continues to pass gas.  At this time pain is 3/10 and only located at RUQ.  Pt denies any fevers, chills, chest pain, SOB, numbness, weakness, dysuria, cloudy urine, diarrhea, constipation, melena, brbpr.

## 2019-08-05 NOTE — ED PROVIDER NOTE - PROGRESS NOTE DETAILS
Pt signed out to Dr. dvoe. Pt stated she had abdominal pain today now resolved. RUQ US with Cholelithiasis and CT scan with some evidence of duodenitis. Patient tolerated PO and is feeling well. Will give PO Abx for 5 days and discharge with GI follow-up.   -Zan Dove PGY4 [facility]#[id number]

## 2019-08-06 ENCOUNTER — INPATIENT (INPATIENT)
Facility: HOSPITAL | Age: 84
LOS: 5 days | Discharge: HOME | DRG: 378 | End: 2019-08-12
Attending: STUDENT IN AN ORGANIZED HEALTH CARE EDUCATION/TRAINING PROGRAM | Admitting: INTERNAL MEDICINE
Payer: MEDICARE

## 2019-08-06 VITALS
RESPIRATION RATE: 19 BRPM | HEIGHT: 61 IN | SYSTOLIC BLOOD PRESSURE: 124 MMHG | OXYGEN SATURATION: 99 % | TEMPERATURE: 98 F | DIASTOLIC BLOOD PRESSURE: 66 MMHG | HEART RATE: 76 BPM | WEIGHT: 130.07 LBS

## 2019-08-06 VITALS
HEART RATE: 100 BPM | DIASTOLIC BLOOD PRESSURE: 79 MMHG | RESPIRATION RATE: 18 BRPM | OXYGEN SATURATION: 100 % | TEMPERATURE: 98 F | SYSTOLIC BLOOD PRESSURE: 179 MMHG

## 2019-08-06 DIAGNOSIS — R53.1 WEAKNESS: ICD-10-CM

## 2019-08-06 LAB
ALBUMIN SERPL ELPH-MCNC: 3.7 G/DL — SIGNIFICANT CHANGE UP (ref 3.3–5)
ALP SERPL-CCNC: 51 U/L — SIGNIFICANT CHANGE UP (ref 40–120)
ALT FLD-CCNC: 7 U/L — LOW (ref 10–45)
ANION GAP SERPL CALC-SCNC: 14 MMOL/L — SIGNIFICANT CHANGE UP (ref 5–17)
APPEARANCE UR: CLEAR — SIGNIFICANT CHANGE UP
AST SERPL-CCNC: 10 U/L — SIGNIFICANT CHANGE UP (ref 10–40)
BACTERIA # UR AUTO: NEGATIVE — SIGNIFICANT CHANGE UP
BASOPHILS # BLD AUTO: 0 K/UL — SIGNIFICANT CHANGE UP (ref 0–0.2)
BASOPHILS NFR BLD AUTO: 0.3 % — SIGNIFICANT CHANGE UP (ref 0–2)
BILIRUB SERPL-MCNC: 0.2 MG/DL — SIGNIFICANT CHANGE UP (ref 0.2–1.2)
BILIRUB UR-MCNC: NEGATIVE — SIGNIFICANT CHANGE UP
BUN SERPL-MCNC: 56 MG/DL — HIGH (ref 7–23)
CALCIUM SERPL-MCNC: 9.2 MG/DL — SIGNIFICANT CHANGE UP (ref 8.4–10.5)
CHLORIDE SERPL-SCNC: 110 MMOL/L — HIGH (ref 96–108)
CO2 SERPL-SCNC: 21 MMOL/L — LOW (ref 22–31)
COLOR SPEC: YELLOW — SIGNIFICANT CHANGE UP
CREAT SERPL-MCNC: 1.32 MG/DL — HIGH (ref 0.5–1.3)
DIFF PNL FLD: NEGATIVE — SIGNIFICANT CHANGE UP
EOSINOPHIL # BLD AUTO: 0.1 K/UL — SIGNIFICANT CHANGE UP (ref 0–0.5)
EOSINOPHIL NFR BLD AUTO: 1.1 % — SIGNIFICANT CHANGE UP (ref 0–6)
EPI CELLS # UR: 1 /HPF — SIGNIFICANT CHANGE UP
GLUCOSE SERPL-MCNC: 103 MG/DL — HIGH (ref 70–99)
GLUCOSE UR QL: NEGATIVE — SIGNIFICANT CHANGE UP
HCT VFR BLD CALC: 27.9 % — LOW (ref 34.5–45)
HGB BLD-MCNC: 9.4 G/DL — LOW (ref 11.5–15.5)
HYALINE CASTS # UR AUTO: 0 /LPF — SIGNIFICANT CHANGE UP (ref 0–2)
KETONES UR-MCNC: NEGATIVE — SIGNIFICANT CHANGE UP
LEUKOCYTE ESTERASE UR-ACNC: NEGATIVE — SIGNIFICANT CHANGE UP
LIDOCAIN IGE QN: 36 U/L — SIGNIFICANT CHANGE UP (ref 7–60)
LYMPHOCYTES # BLD AUTO: 1 K/UL — SIGNIFICANT CHANGE UP (ref 1–3.3)
LYMPHOCYTES # BLD AUTO: 10.4 % — LOW (ref 13–44)
MCHC RBC-ENTMCNC: 30.5 PG — SIGNIFICANT CHANGE UP (ref 27–34)
MCHC RBC-ENTMCNC: 33.7 GM/DL — SIGNIFICANT CHANGE UP (ref 32–36)
MCV RBC AUTO: 90.5 FL — SIGNIFICANT CHANGE UP (ref 80–100)
MONOCYTES # BLD AUTO: 0.9 K/UL — SIGNIFICANT CHANGE UP (ref 0–0.9)
MONOCYTES NFR BLD AUTO: 9 % — SIGNIFICANT CHANGE UP (ref 2–14)
NEUTROPHILS # BLD AUTO: 8 K/UL — HIGH (ref 1.8–7.4)
NEUTROPHILS NFR BLD AUTO: 79.3 % — HIGH (ref 43–77)
NITRITE UR-MCNC: NEGATIVE — SIGNIFICANT CHANGE UP
PH UR: 6 — SIGNIFICANT CHANGE UP (ref 5–8)
PLATELET # BLD AUTO: 301 K/UL — SIGNIFICANT CHANGE UP (ref 150–400)
POTASSIUM SERPL-MCNC: 3.3 MMOL/L — LOW (ref 3.5–5.3)
POTASSIUM SERPL-SCNC: 3.3 MMOL/L — LOW (ref 3.5–5.3)
PROT SERPL-MCNC: 6.1 G/DL — SIGNIFICANT CHANGE UP (ref 6–8.3)
PROT UR-MCNC: ABNORMAL
RBC # BLD: 3.08 M/UL — LOW (ref 3.8–5.2)
RBC # FLD: 12.4 % — SIGNIFICANT CHANGE UP (ref 10.3–14.5)
RBC CASTS # UR COMP ASSIST: 1 /HPF — SIGNIFICANT CHANGE UP (ref 0–4)
SODIUM SERPL-SCNC: 145 MMOL/L — SIGNIFICANT CHANGE UP (ref 135–145)
SP GR SPEC: 1.04 — HIGH (ref 1.01–1.02)
UROBILINOGEN FLD QL: NEGATIVE — SIGNIFICANT CHANGE UP
WBC # BLD: 10.1 K/UL — SIGNIFICANT CHANGE UP (ref 3.8–10.5)
WBC # FLD AUTO: 10.1 K/UL — SIGNIFICANT CHANGE UP (ref 3.8–10.5)
WBC UR QL: 1 /HPF — SIGNIFICANT CHANGE UP (ref 0–5)

## 2019-08-06 PROCEDURE — 99285 EMERGENCY DEPT VISIT HI MDM: CPT

## 2019-08-06 PROCEDURE — 71045 X-RAY EXAM CHEST 1 VIEW: CPT | Mod: 26

## 2019-08-06 PROCEDURE — 70450 CT HEAD/BRAIN W/O DYE: CPT | Mod: 26

## 2019-08-06 RX ORDER — SODIUM CHLORIDE 9 MG/ML
1000 INJECTION INTRAMUSCULAR; INTRAVENOUS; SUBCUTANEOUS ONCE
Refills: 0 | Status: COMPLETED | OUTPATIENT
Start: 2019-08-06 | End: 2019-08-06

## 2019-08-06 RX ORDER — METRONIDAZOLE 500 MG
500 TABLET ORAL ONCE
Refills: 0 | Status: COMPLETED | OUTPATIENT
Start: 2019-08-06 | End: 2019-08-06

## 2019-08-06 RX ORDER — POTASSIUM CHLORIDE 20 MEQ
40 PACKET (EA) ORAL ONCE
Refills: 0 | Status: COMPLETED | OUTPATIENT
Start: 2019-08-06 | End: 2019-08-06

## 2019-08-06 RX ORDER — PANTOPRAZOLE SODIUM 20 MG/1
80 TABLET, DELAYED RELEASE ORAL ONCE
Refills: 0 | Status: COMPLETED | OUTPATIENT
Start: 2019-08-06 | End: 2019-08-07

## 2019-08-06 RX ORDER — SODIUM CHLORIDE 9 MG/ML
500 INJECTION INTRAMUSCULAR; INTRAVENOUS; SUBCUTANEOUS ONCE
Refills: 0 | Status: COMPLETED | OUTPATIENT
Start: 2019-08-06 | End: 2019-08-06

## 2019-08-06 RX ORDER — AMLODIPINE BESYLATE 2.5 MG/1
10 TABLET ORAL ONCE
Refills: 0 | Status: COMPLETED | OUTPATIENT
Start: 2019-08-06 | End: 2019-08-06

## 2019-08-06 RX ORDER — CIPROFLOXACIN LACTATE 400MG/40ML
500 VIAL (ML) INTRAVENOUS ONCE
Refills: 0 | Status: COMPLETED | OUTPATIENT
Start: 2019-08-06 | End: 2019-08-06

## 2019-08-06 RX ORDER — MOXIFLOXACIN HYDROCHLORIDE TABLETS, 400 MG 400 MG/1
1 TABLET, FILM COATED ORAL
Qty: 10 | Refills: 0
Start: 2019-08-06 | End: 2019-08-10

## 2019-08-06 RX ORDER — METRONIDAZOLE 500 MG
1 TABLET ORAL
Qty: 15 | Refills: 0
Start: 2019-08-06 | End: 2019-08-10

## 2019-08-06 RX ADMIN — Medication 500 MILLIGRAM(S): at 18:11

## 2019-08-06 RX ADMIN — SODIUM CHLORIDE 500 MILLILITER(S): 9 INJECTION INTRAMUSCULAR; INTRAVENOUS; SUBCUTANEOUS at 20:40

## 2019-08-06 RX ADMIN — Medication 500 MILLIGRAM(S): at 03:12

## 2019-08-06 RX ADMIN — Medication 40 MILLIEQUIVALENT(S): at 20:40

## 2019-08-06 RX ADMIN — SODIUM CHLORIDE 500 MILLILITER(S): 9 INJECTION INTRAMUSCULAR; INTRAVENOUS; SUBCUTANEOUS at 17:31

## 2019-08-06 RX ADMIN — AMLODIPINE BESYLATE 10 MILLIGRAM(S): 2.5 TABLET ORAL at 00:47

## 2019-08-06 RX ADMIN — Medication 500 MILLIGRAM(S): at 03:13

## 2019-08-06 NOTE — ED ADULT NURSE REASSESSMENT NOTE - NS ED NURSE REASSESS COMMENT FT1
Pt. tolerated PO intake well, sitting up in stretcher in no apparent distress. Breathing unlabored on RA. Skin warm pink and dry. Safety measures reinforced-bed in lowest position, side rails up.

## 2019-08-06 NOTE — ED ADULT NURSE NOTE - OBJECTIVE STATEMENT
89 yo F presents to ED A+Ox3 by EMS from home c/o back pain and abdominal pain. Pt. states she was seen and d/c from [facility] this morning. Pt. states denies pain at this time. Pt. reports having intermittent back and abdominal pain x 2 weeks. Breathing unlabored on RA. Skin warm pink and dry. Abdomen soft, nondistended, nontender. Pt. is well appearing, sitting up in stretcher. Bed in lowest position, side rails up.

## 2019-08-06 NOTE — ED ADULT NURSE REASSESSMENT NOTE - NS ED NURSE REASSESS COMMENT FT1
Pt resting on stretcher, denies chest pain, sob, n/v/d, abdominal pain, lower back at the moment. As provider order, pt to be PO challenged. Respirations even and unlabored, NAD at the moment. will continue to monitor

## 2019-08-06 NOTE — CONSULT NOTE ADULT - ASSESSMENT
ASSESSMENT: Patient is a 89F with epigastric abd pain x2 weeks, with CT findings most consistent with duodenitis vs possible peptic ulcer.    PLAN:   - No surgical interventions indicated at this time  - Recommend GI follow up    Discussed with Dr. Remy Oliveira, PGY-3  A Team Surgery [id number]

## 2019-08-06 NOTE — ED PROVIDER NOTE - OBJECTIVE STATEMENT
89yo F pmhx of HTN, HLD, A.fib presents to ER for episode of generalized weakness after being discharged from [facility] this morning.  Patient states she was having severe RUQ pain  associated  with nausea x1week, saw her PCP Dr. Cool who sent her to [facility].  At [facility] has CT, labs, UA, sono which 89yo F pmhx of HTN, HLD, A.fib presents to ER for episode of generalized weakness after being discharged from [facility] this morning.  Patient states she was having severe RUQ pain  associated  with nausea x1week, saw her PCP Dr. Cool who sent her to [facility] to r/o gall bladder disease.  Patient was seen and evaluated, had CT, labs, UA, sono which showed cholelithiasis, and doudentitis started on Cipro Flagyl. Patient was discharged home this morning, when she got home reports he neighbor came to check on her and she was feeling generalized weakness, so her neighbor called her PCP and was advised to take her back t the hospital.   Denies fever, chills, n/v/d, worsening pain, dizziness, numbness/tingling, cough, CP, SOB, urinary symptoms

## 2019-08-06 NOTE — ED PROVIDER NOTE - PROGRESS NOTE DETAILS
Spoke to Dr. Cool on phone, states when patient returned home from hospital was complaining of abdominal pain.  So told her to come back.  Agrees to plan to repeat labs, PO trail and if patinet is pain free, dc home with outpatinet surgery follow up labs and imaging reviewed.  patient tolerating PO well.  However still feeling weak when up ambulating.  Will admit to Medicine.  Noted HBG slowly dropping.  Patinet continued to deny blood stool.  Will start protonix and get Type and screen, NPO

## 2019-08-06 NOTE — CONSULT NOTE ADULT - SUBJECTIVE AND OBJECTIVE BOX
GENERAL SURGERY CONSULT NOTE  --------------------------------------------------------------------------------------------    HPI:  Pt is a 89 y/o F PMHx afib (no anticoagulation), HTN, breast cancer, colon cancer w/ h/o colon resection p/w abdominal pain x 2 weeks.  Pt notes intermittent RUQ pain, sharp at times triggered with PO intake and associated with nausea and vomiting.  Pt notes sometimes radiates to right flank and right shoulder.  Pt notes pain worsening and more constant for past 5 days with which pt has not been able to keep down any solid food with which pt has not had any bowel movements for past 4-5 days, but notes continues to pass gas.  At this time pain is 3/10 and only located at RUQ.  Pt denies any fevers, chills, chest pain, SOB, numbness, weakness, dysuria, cloudy urine, diarrhea, constipation, melena, brbpr.      PAST MEDICAL & SURGICAL HISTORY:  Pacemaker  Colon cancer  Breast cancer  HTN (hypertension)  Renal insufficiency  Atrial fibrillation  S/P hysterectomy  S/P colon resection:  for colon ca.  S/P mastectomy:   S/P peripheral artery bypass: in       FAMILY HISTORY:  No pertinent family history in first degree relatives  Family history not pertinent as reviewed with the patient and family      ALLERGIES: No Known Allergies      CURRENT MEDICATIONS  MEDICATIONS (STANDING):   MEDICATIONS (PRN):  --------------------------------------------------------------------------------------------    Vitals:   T(C): 36.7 (19 @ 00:40), Max: 36.7 (19 @ 00:40)  HR: 100 (19 @ 00:40) (66 - 100)  BP: 179/79 (19 @ 00:40) (127/73 - 179/79)  RR: 18 (19 @ 00:40) (16 - 18)  SpO2: 100% (19 @ 00:40) (97% - 100%)  CAPILLARY BLOOD GLUCOSE      PHYSICAL EXAM:   General: NAD, Lying in bed comfortably  Neuro: A+Ox3  HEENT: NC/AT, EOMI  Neck: Soft, supple  Cardio: RRR, nml S1/S2  Resp: Good effort, CTA b/l  GI/Abd: Soft, NT/ND, no rebound/guarding, no masses palpated  Vascular: All 4 extremities warm.  Skin: Intact, no breakdown  Lymphatic/Nodes: No palpable lymphadenopathy  Musculoskeletal: All 4 extremities moving spontaneously, no limitations  --------------------------------------------------------------------------------------------    LABS  CBC (:06)                              10.3<L>                         11.11<H>  )----------------(  355        80.3<H>% Neutrophils, 10.1<L>% Lymphocytes, ANC: 8.92<H>                              30.9<L>    BMP (:)             146<H>  |  107     |  44<H> 		Ca++ --      Ca 9.7                ---------------------------------( 134<H>		Mg --                 3.8     |  25      |  1.28  			Ph --        LFTs (:06)      TPro 6.6 / Alb 3.7 / TBili 0.4 / DBili -- / AST 9 / ALT 8 / AlkPhos 53    Coags ( 19:06)  aPTT 28.2 / INR 1.16 / PT 13.3<H>          --------------------------------------------------------------------------------------------    MICROBIOLOGY  Urinalysis ( @ 20:17):     Color: YELLOW / Appearance: CLEAR / S.021 / pH: 6.0 / Gluc: NEGATIVE / Ketones: NEGATIVE / Bili: NEGATIVE / Urobili: NORMAL / Protein :20 / Nitrites: NEGATIVE / Leuk.Est: MODERATE / RBC: 0-2 / WBC: 11-25<H> / Sq Epi:  / Non Sq Epi: FEW / Bacteria FEW         --------------------------------------------------------------------------------------------    IMAGING  < from: CT Abdomen and Pelvis w/ IV Cont (19 @ 22:13) >  FINDINGS:    LOWER CHEST: Bibasilar linear atelectasis. Partially imaged cardiac leads.    LIVER: Within normal limits.  BILE DUCTS: Normal caliber.  GALLBLADDER: Nondistended gallbladder. There is vicarious excretion of   contrast. Cholelithiasis.  SPLEEN: Within normal limits.  PANCREAS: Within normal limits.  ADRENALS: Redemonstration of a right adrenal nodule measuring   approximately 15 mm. Unremarkable left adrenal gland.  KIDNEYS/URETERS: Bilateral renal cysts and low-attenuation lesions too   small to accurately characterize. Nonobstructing left upper pole renal   calculus. No hydronephrosis. Symmetric parenchymal enhancement.    BLADDER: Within normal limits.  REPRODUCTIVE ORGANS: Status post hysterectomy.    BOWEL: Focal inflammatory change, which appears to be centered on the   first portion of the duodenum. No bowel obstruction. Status post sigmoid   colonic anastomosis. Colonic diverticulosis.  PERITONEUM: No discrete extraluminal gas or drainable collection.  VESSELS: Extensive atherosclerosis of the abdominal aorta and its branch   vessels. Occlusion of the left iliac artery. Status post femorofemoral   bypass with limited evaluation of the bypass graft  RETROPERITONEUM/LYMPH NODES: No lymphadenopathy.    ABDOMINAL WALL: Within normal limits.  BONES: Old left lower rib fracture deformities. Dextroscoliosis of the   lumbar spine with multilevel degenerative change.    IMPRESSION:     Inflammatory change centered on the proximal duodenum. Findings may   reflect underlying duodenitis/peptic ulcer disease. No discrete   extraluminal gas or drainable collection. Further evaluation with   endoscopy and/or upper GI series may be obtained as clinically warranted.    < from: US Abdomen Limited (19 @ 19:25) >  FINDINGS:    Liver: Within normal limits.    Bile ducts: Normal caliber. Common bile duct measures 4 mm.     Gallbladder: Cholelithiasis in a contracted gallbladder. Negative   sonographic Espinal sign.    Pancreas: Limited visualization.    Right kidney: 8.5 cm. No hydronephrosis. Interpolar cyst is present   measuring 1.1 cm in greatest dimension.    Ascites: None.    Aorta and IVC: Visualized portions are within normal limits.    IMPRESSION:     Cholelithiasis, in a contracted gallbladder. No wall thickening,   surrounding fluid or Espinal sign to suggest acute cholecystitis.    < end of copied text >  Cholelithiasis.    < end of copied text >

## 2019-08-06 NOTE — ED ADULT NURSE REASSESSMENT NOTE - NS ED NURSE REASSESS COMMENT FT1
Pt resting comfortably on stretcher, a&ox4, pleasant, calm and cooperative. Pt denies chest pain, abdominal pain, sob, n/v/d.  Pt discharge, waiting for daylight to go home in cab. respirations even and unlabored, NAD. will continue to monitor

## 2019-08-06 NOTE — ED PROVIDER NOTE - ATTENDING CONTRIBUTION TO CARE
Attending MD Green:   I personally have seen and examined this patient.  Physician assistant note reviewed and agree on plan of care and except where noted.

## 2019-08-06 NOTE — ED ADULT NURSE NOTE - INTERVENTIONS DEFINITIONS
[employer] to call system/Monitor gait and stability/Instruct patient to call for assistance/Call bell, personal items and telephone within reach/Stretcher in lowest position, wheels locked, appropriate side rails in place/Reinforce activity limits and safety measures with patient and family/Physically safe environment: no spills, clutter or unnecessary equipment/Monitor for mental status changes and reorient to person, place, and time

## 2019-08-06 NOTE — ED PROVIDER NOTE - CLINICAL SUMMARY MEDICAL DECISION MAKING FREE TEXT BOX
Attending MD Green:  89yo female with PMH for HTN, HLD, afib, RUQ pain x 8 days, patient seen at [facility] ED yesterday.  RUQ sono and CT done.  Showed gallstones, but no cholecystitis. Discharged home with antibiotics for duodenitis.  Instructed to follow up with GI.  Presents for weakness (generalized) denies worsening abd pain or nausea, no fever. States she is hungry.  Exam: A & O x 3, NAD, HEENT WNL and no facial asymmetry; lungs CTAB, heart with reg rhythm without murmur; abdomen soft NTND; extremities with no edema; skin with no rashes, neuro exam non focal with no motor or sensory deficits. Plan:  repeat labs, UA, discuss with primary. Attending MD Green:  89yo female with PMH for HTN, HLD, afib, RUQ pain x 8 days, patient seen at [facility] ED yesterday.  RUQ sono and CT done.  Showed gallstones, but no cholecystitis. Discharged home with antibiotics for duodenitis.  Instructed to follow up with GI.  Presents for weakness (generalized) denies worsening abd pain or nausea, no fever. States she is hungry.  Exam: A & O x 3, NAD, HEENT WNL and no facial asymmetry; lungs CTAB, heart with reg rhythm without murmur; abdomen soft NTND; extremities with no edema; skin with no rashes, neuro exam non focal with no motor or sensory deficits. Plan:  repeat labs, UA, discuss with primary.      Attending MD Green: case discussed with Dr. Donald at 4:45p will re-check labs, feed patient, if pain returns we will consult surgery.  If patient remains asymptomatic we will discharge home for outpatient follow up.

## 2019-08-07 ENCOUNTER — RESULT REVIEW (OUTPATIENT)
Age: 84
End: 2019-08-07

## 2019-08-07 ENCOUNTER — TRANSCRIPTION ENCOUNTER (OUTPATIENT)
Age: 84
End: 2019-08-07

## 2019-08-07 DIAGNOSIS — Z95.828 PRESENCE OF OTHER VASCULAR IMPLANTS AND GRAFTS: Chronic | ICD-10-CM

## 2019-08-07 DIAGNOSIS — R55 SYNCOPE AND COLLAPSE: ICD-10-CM

## 2019-08-07 DIAGNOSIS — E78.5 HYPERLIPIDEMIA, UNSPECIFIED: ICD-10-CM

## 2019-08-07 DIAGNOSIS — I10 ESSENTIAL (PRIMARY) HYPERTENSION: ICD-10-CM

## 2019-08-07 DIAGNOSIS — D64.9 ANEMIA, UNSPECIFIED: ICD-10-CM

## 2019-08-07 DIAGNOSIS — R10.11 RIGHT UPPER QUADRANT PAIN: ICD-10-CM

## 2019-08-07 DIAGNOSIS — Z29.9 ENCOUNTER FOR PROPHYLACTIC MEASURES, UNSPECIFIED: ICD-10-CM

## 2019-08-07 DIAGNOSIS — Z90.710 ACQUIRED ABSENCE OF BOTH CERVIX AND UTERUS: Chronic | ICD-10-CM

## 2019-08-07 DIAGNOSIS — I73.9 PERIPHERAL VASCULAR DISEASE, UNSPECIFIED: ICD-10-CM

## 2019-08-07 DIAGNOSIS — Z90.49 ACQUIRED ABSENCE OF OTHER SPECIFIED PARTS OF DIGESTIVE TRACT: Chronic | ICD-10-CM

## 2019-08-07 DIAGNOSIS — Z98.49 CATARACT EXTRACTION STATUS, UNSPECIFIED EYE: Chronic | ICD-10-CM

## 2019-08-07 DIAGNOSIS — Z90.10 ACQUIRED ABSENCE OF UNSPECIFIED BREAST AND NIPPLE: Chronic | ICD-10-CM

## 2019-08-07 LAB
ANION GAP SERPL CALC-SCNC: 12 MMOL/L — SIGNIFICANT CHANGE UP (ref 5–17)
BLD GP AB SCN SERPL QL: NEGATIVE — SIGNIFICANT CHANGE UP
BUN SERPL-MCNC: 38 MG/DL — HIGH (ref 7–23)
CALCIUM SERPL-MCNC: 8.3 MG/DL — LOW (ref 8.4–10.5)
CHLORIDE SERPL-SCNC: 116 MMOL/L — HIGH (ref 96–108)
CO2 SERPL-SCNC: 17 MMOL/L — LOW (ref 22–31)
CREAT SERPL-MCNC: 1.19 MG/DL — SIGNIFICANT CHANGE UP (ref 0.5–1.3)
CULTURE RESULTS: NO GROWTH — SIGNIFICANT CHANGE UP
FERRITIN SERPL-MCNC: 75 NG/ML — SIGNIFICANT CHANGE UP (ref 15–150)
GLUCOSE SERPL-MCNC: 84 MG/DL — SIGNIFICANT CHANGE UP (ref 70–99)
HCT VFR BLD CALC: 22.8 % — LOW (ref 34.5–45)
HCT VFR BLD CALC: 23 % — LOW (ref 34.5–45)
HCT VFR BLD CALC: 27.6 % — LOW (ref 34.5–45)
HGB BLD-MCNC: 7.4 G/DL — LOW (ref 11.5–15.5)
HGB BLD-MCNC: 8.4 G/DL — LOW (ref 11.5–15.5)
HGB BLD-MCNC: 9.4 G/DL — LOW (ref 11.5–15.5)
IRON SATN MFR SERPL: 20 % — SIGNIFICANT CHANGE UP (ref 14–50)
IRON SATN MFR SERPL: 40 UG/DL — SIGNIFICANT CHANGE UP (ref 30–160)
MCHC RBC-ENTMCNC: 29.6 PG — SIGNIFICANT CHANGE UP (ref 27–34)
MCHC RBC-ENTMCNC: 30.9 PG — SIGNIFICANT CHANGE UP (ref 27–34)
MCHC RBC-ENTMCNC: 32.2 GM/DL — SIGNIFICANT CHANGE UP (ref 32–36)
MCHC RBC-ENTMCNC: 33.4 PG — SIGNIFICANT CHANGE UP (ref 27–34)
MCHC RBC-ENTMCNC: 34 GM/DL — SIGNIFICANT CHANGE UP (ref 32–36)
MCHC RBC-ENTMCNC: 37 GM/DL — HIGH (ref 32–36)
MCV RBC AUTO: 90.5 FL — SIGNIFICANT CHANGE UP (ref 80–100)
MCV RBC AUTO: 91.1 FL — SIGNIFICANT CHANGE UP (ref 80–100)
MCV RBC AUTO: 92 FL — SIGNIFICANT CHANGE UP (ref 80–100)
PLATELET # BLD AUTO: 261 K/UL — SIGNIFICANT CHANGE UP (ref 150–400)
PLATELET # BLD AUTO: 270 K/UL — SIGNIFICANT CHANGE UP (ref 150–400)
PLATELET # BLD AUTO: 321 K/UL — SIGNIFICANT CHANGE UP (ref 150–400)
POTASSIUM SERPL-MCNC: 3.5 MMOL/L — SIGNIFICANT CHANGE UP (ref 3.5–5.3)
POTASSIUM SERPL-SCNC: 3.5 MMOL/L — SIGNIFICANT CHANGE UP (ref 3.5–5.3)
RBC # BLD: 2.5 M/UL — LOW (ref 3.8–5.2)
RBC # BLD: 2.53 M/UL — LOW (ref 3.8–5.2)
RBC # BLD: 3.03 M/UL — LOW (ref 3.8–5.2)
RBC # FLD: 12.4 % — SIGNIFICANT CHANGE UP (ref 10.3–14.5)
RBC # FLD: 12.5 % — SIGNIFICANT CHANGE UP (ref 10.3–14.5)
RBC # FLD: 14 % — SIGNIFICANT CHANGE UP (ref 10.3–14.5)
RH IG SCN BLD-IMP: POSITIVE — SIGNIFICANT CHANGE UP
SODIUM SERPL-SCNC: 145 MMOL/L — SIGNIFICANT CHANGE UP (ref 135–145)
SPECIMEN SOURCE: SIGNIFICANT CHANGE UP
TIBC SERPL-MCNC: 205 UG/DL — LOW (ref 220–430)
UIBC SERPL-MCNC: 165 UG/DL — SIGNIFICANT CHANGE UP (ref 110–370)
WBC # BLD: 6.4 K/UL — SIGNIFICANT CHANGE UP (ref 3.8–10.5)
WBC # BLD: 7.39 K/UL — SIGNIFICANT CHANGE UP (ref 3.8–10.5)
WBC # BLD: 7.6 K/UL — SIGNIFICANT CHANGE UP (ref 3.8–10.5)
WBC # FLD AUTO: 6.4 K/UL — SIGNIFICANT CHANGE UP (ref 3.8–10.5)
WBC # FLD AUTO: 7.39 K/UL — SIGNIFICANT CHANGE UP (ref 3.8–10.5)
WBC # FLD AUTO: 7.6 K/UL — SIGNIFICANT CHANGE UP (ref 3.8–10.5)

## 2019-08-07 PROCEDURE — 88305 TISSUE EXAM BY PATHOLOGIST: CPT | Mod: 26

## 2019-08-07 PROCEDURE — 99223 1ST HOSP IP/OBS HIGH 75: CPT

## 2019-08-07 PROCEDURE — 99222 1ST HOSP IP/OBS MODERATE 55: CPT | Mod: GC,25

## 2019-08-07 PROCEDURE — 88312 SPECIAL STAINS GROUP 1: CPT | Mod: 26

## 2019-08-07 PROCEDURE — 43239 EGD BIOPSY SINGLE/MULTIPLE: CPT | Mod: GC

## 2019-08-07 RX ORDER — PENTOXIFYLLINE 400 MG
400 TABLET, EXTENDED RELEASE ORAL THREE TIMES A DAY
Refills: 0 | Status: DISCONTINUED | OUTPATIENT
Start: 2019-08-07 | End: 2019-08-12

## 2019-08-07 RX ORDER — ATORVASTATIN CALCIUM 80 MG/1
40 TABLET, FILM COATED ORAL AT BEDTIME
Refills: 0 | Status: DISCONTINUED | OUTPATIENT
Start: 2019-08-07 | End: 2019-08-12

## 2019-08-07 RX ORDER — SODIUM CHLORIDE 9 MG/ML
1000 INJECTION INTRAMUSCULAR; INTRAVENOUS; SUBCUTANEOUS
Refills: 0 | Status: DISCONTINUED | OUTPATIENT
Start: 2019-08-07 | End: 2019-08-08

## 2019-08-07 RX ORDER — ASPIRIN/CALCIUM CARB/MAGNESIUM 324 MG
81 TABLET ORAL DAILY
Refills: 0 | Status: DISCONTINUED | OUTPATIENT
Start: 2019-08-07 | End: 2019-08-07

## 2019-08-07 RX ORDER — AMLODIPINE BESYLATE 2.5 MG/1
10 TABLET ORAL DAILY
Refills: 0 | Status: DISCONTINUED | OUTPATIENT
Start: 2019-08-07 | End: 2019-08-12

## 2019-08-07 RX ORDER — PANTOPRAZOLE SODIUM 20 MG/1
8 TABLET, DELAYED RELEASE ORAL
Qty: 80 | Refills: 0 | Status: DISCONTINUED | OUTPATIENT
Start: 2019-08-07 | End: 2019-08-10

## 2019-08-07 RX ADMIN — PANTOPRAZOLE SODIUM 10 MG/HR: 20 TABLET, DELAYED RELEASE ORAL at 02:06

## 2019-08-07 RX ADMIN — Medication 400 MILLIGRAM(S): at 14:50

## 2019-08-07 RX ADMIN — AMLODIPINE BESYLATE 10 MILLIGRAM(S): 2.5 TABLET ORAL at 07:05

## 2019-08-07 RX ADMIN — SODIUM CHLORIDE 75 MILLILITER(S): 9 INJECTION INTRAMUSCULAR; INTRAVENOUS; SUBCUTANEOUS at 11:39

## 2019-08-07 RX ADMIN — ATORVASTATIN CALCIUM 40 MILLIGRAM(S): 80 TABLET, FILM COATED ORAL at 21:11

## 2019-08-07 RX ADMIN — PANTOPRAZOLE SODIUM 10 MG/HR: 20 TABLET, DELAYED RELEASE ORAL at 11:39

## 2019-08-07 RX ADMIN — PANTOPRAZOLE SODIUM 80 MILLIGRAM(S): 20 TABLET, DELAYED RELEASE ORAL at 02:00

## 2019-08-07 RX ADMIN — Medication 400 MILLIGRAM(S): at 21:11

## 2019-08-07 NOTE — H&P ADULT - PROBLEM SELECTOR PLAN 6
VTE ppx: venodyne boots for now  Activity: ambulate with assistance  Diet: NPO for now Patient is on trental for history of peripheral vascular disease s/p stent of lower extremity. Will hold trental for now due to suspected occult bleeding.  Can continue baby aspirin for now. Patient is on trental for history of peripheral vascular disease s/p stent of lower extremity. Will continue trental for now  Can continue baby aspirin for now.

## 2019-08-07 NOTE — DISCHARGE NOTE NURSING/CASE MANAGEMENT/SOCIAL WORK - NSDCDPATPORTLINK_GEN_ALL_CORE
You can access the [facility] Portal, offered by [facility], by registering with the following website: [email or web address]

## 2019-08-07 NOTE — DISCHARGE NOTE NURSING/CASE MANAGEMENT/SOCIAL WORK - NSDCPEWEB_GEN_ALL_CORE
[facility] for Tobacco Control website --- [email or web address] --- [email or web address]

## 2019-08-07 NOTE — CONSULT NOTE ADULT - SUBJECTIVE AND OBJECTIVE BOX
Chief Complaint:  Patient is a 89y old  Female who presents with a chief complaint of abdominal pain and generalized weakness (07 Aug 2019 02:57)      HPI: 89F with history of breast CA, colon CA s/p resection in , htn, hLd, afib s/p ppm - not on AC per chart  GIB who presented to the ED with complaint of abdominal pain and generalized weakness. Abdominal pain is RUQ, constant, severe and radiates to back. + nausea and decreased appetite, no vomiting. She reports she is unsure when her last BM was but possibly approx 1 week ago. She states she does not look to know the color however reports she doesn't recall ever seeing black stools or blood in her stool. She also reports severe weakness over the past few weeks having to ambulate with a walker which she normally does not use. She also reports feeling extremely unsteady on her feet with lightheadedness and needing assistance now to ambulate. She went to [facility] on  for the pain and had US which showed cholelithiasis - no evidence of cholecystitis and CT a/p with duodenitis in 1st part of duodenum. She was d/c on protonix, cipro and flagyl. While there she had an episode of presyncope while using the restroom. She denies any CP, palpitations, SOB.      In the ED T 97.7F, HR 76, /66, RR 19, SpO2 99%RA   Pt was given protonix 80mg IVP, flagyl 500mg PO, KCl 40mEq, protonix gtt, IVNS 1500cc.    Currently she reports abdominal pain has resolved however lightheadedness and weakness persist. She still has not had a BM however has passed gas. She reports she takes aspirin only. Does not take NSAIDs, only tylenol for pain when needed. Does not take any other blood thinners. She reports she has never had an EGD. She has had colonoscopies however states the last one was many years ago.     Allergies:  No Known Allergies      Home Medications:    Hospital Medications:  amLODIPine   Tablet 10 milliGRAM(s) Oral daily  atorvastatin 40 milliGRAM(s) Oral at bedtime  pantoprazole Infusion 8 mG/Hr IV Continuous <Continuous>  pentoxifylline 400 milliGRAM(s) Oral three times a day  sodium chloride 0.9%. 1000 milliLiter(s) IV Continuous <Continuous>      PMHX/PSHX:  Colon cancer  Breast cancer  HLD (hyperlipidemia)  HTN (hypertension)  Pacemaker  Colon cancer  Breast cancer  HTN (hypertension)  Renal insufficiency  Atrial fibrillation  S/P cataract surgery  S/P vascular bypass  H/O mastectomy  S/P hysterectomy  S/P colon resection  S/P hysterectomy  S/P colon resection  S/P mastectomy  S/P peripheral artery bypass      Family history:  FH: smoking  No pertinent family history in first degree relatives      Social History:     ROS:     General:  see HPI  Eyes:  Good vision, no reported pain  ENT:  No sore throat, pain, runny nose, dysphagia  CV:  No pain, palpitations, hypo/hypertension  Resp:  No dyspnea, cough, tachypnea, wheezing  GI:  see HPI  :  No pain, bleeding, incontinence, nocturia  Muscle: No pain  Neuro: no tingling, memory problems  Psych:  No insomnia, mood problems, depression  Endocrine:  No polyuria, polydipsia, cold/heat intolerance  Heme:  No petechiae, ecchymosis, easy bruisability  Skin:  No rash, edema      PHYSICAL EXAM:     GENERAL:  Appears stated age, well-groomed, well-nourished, no distress  HEENT:  NC/AT,  conjunctivae clear and pink, no thyromegaly, nodules, adenopathy, no JVD, sclera -anicteric  CHEST:  Full & symmetric excursion, no increased effort, breath sounds clear  HEART:  Regular rhythm, S1, S2, no murmur/rub/S3/S4, no abdominal bruit, no edema  ABDOMEN:  Soft, non-tender, non-distended, normoactive bowel sounds,  no masses ,no hepato-splenomegaly, no signs of chronic liver disease, faint midline scar  EXTEREMITIES:  no cyanosis,clubbing or edema  SKIN:  No rash/erythema/ecchymoses/petechiae/wounds/abscess/warm/dry  NEURO:  Alert, oriented, no asterixis, no tremor, no encephalopathy    Vital Signs:  Vital Signs Last 24 Hrs  T(C): 37 (07 Aug 2019 04:42), Max: 37 (07 Aug 2019 04:42)  T(F): 98.6 (07 Aug 2019 04:42), Max: 98.6 (07 Aug 2019 04:42)  HR: 73 (07 Aug 2019 04:42) (73 - 94)  BP: 136/67 (07 Aug 2019 04:42) (124/66 - 167/78)  BP(mean): --  RR: 18 (07 Aug 2019 04:42) (18 - 19)  SpO2: 97% (07 Aug 2019 04:42) (96% - 99%)  Daily Height in cm: 157.48 (07 Aug 2019 00:52)    Daily     LABS:                        7.4    7.39  )-----------( 261      ( 07 Aug 2019 08:36 )             23.0     Mean Cell Volume: 92.0 fl (19 @ 08:36)        145  |  116<H>  |  38<H>  ----------------------------<  84  3.5   |  17<L>  |  1.19    Ca    8.3<L>      07 Aug 2019 06:03    TPro  6.1  /  Alb  3.7  /  TBili  0.2  /  DBili  x   /  AST  10  /  ALT  7<L>  /  AlkPhos  51  08-06    LIVER FUNCTIONS - ( 06 Aug 2019 17:39 )  Alb: 3.7 g/dL / Pro: 6.1 g/dL / ALK PHOS: 51 U/L / ALT: 7 U/L / AST: 10 U/L / GGT: x           PT/INR - ( 05 Aug 2019 19:06 )   PT: 13.3 SEC;   INR: 1.16          PTT - ( 05 Aug 2019 19:06 )  PTT:28.2 SEC  Urinalysis Basic - ( 06 Aug 2019 19:49 )    Color: Yellow / Appearance: Clear / S.039 / pH: x  Gluc: x / Ketone: Negative  / Bili: Negative / Urobili: Negative   Blood: x / Protein: Trace / Nitrite: Negative   Leuk Esterase: Negative / RBC: 1 /hpf / WBC 1 /HPF   Sq Epi: x / Non Sq Epi: 1 /hpf / Bacteria: Negative      Amylase Serum--      Lipase serum36       Ammonia--                          7.4    7.39  )-----------( 261      ( 07 Aug 2019 08:36 )             23.0                         9.4    10.1  )-----------( 301      ( 06 Aug 2019 17:39 )             27.9                         10.3   11.11 )-----------( 355      ( 05 Aug 2019 19:06 )             30.9     Imaging: Chief Complaint:  Patient is a 89y old  Female who presents with a chief complaint of abdominal pain and generalized weakness (07 Aug 2019 02:57)      HPI: 89F with history of breast CA, colon CA s/p resection in , htn, hLd, afib s/p ppm - not on AC per chart / GIB who presented to the ED with complaint of abdominal pain and generalized weakness. Abdominal pain is RUQ, constant, severe and radiates to back. + nausea and decreased appetite, no vomiting. She reports she is unsure when her last BM was but possibly approx 1 week ago. She states she does not look to know the color however reports she doesn't recall ever seeing black stools or blood in her stool. She also reports severe weakness over the past few weeks having to ambulate with a walker which she normally does not use. She also reports feeling extremely unsteady on her feet with lightheadedness and needing assistance now to ambulate. She went to [facility] on  for the pain and had US which showed cholelithiasis - no evidence of cholecystitis and CT a/p with duodenitis in 1st part of duodenum. She was d/c on protonix, cipro and flagyl. While there she had an episode of presyncope while using the restroom. She denies any CP, palpitations, SOB.      In the ED T 97.7F, HR 76, /66, RR 19, SpO2 99%RA   Pt was given protonix 80mg IVP, flagyl 500mg PO, KCl 40mEq, protonix gtt, IVNS 1500cc.    Currently she reports abdominal pain has resolved however lightheadedness and weakness persist. She still has not had a BM however has passed gas. She reports she takes aspirin only. Does not take NSAIDs, only tylenol for pain when needed. Does not take any other blood thinners. She reports she has never had an EGD. She has had colonoscopies however states the last one was many years ago.     Allergies:  No Known Allergies      Home Medications:    Hospital Medications:  amLODIPine   Tablet 10 milliGRAM(s) Oral daily  atorvastatin 40 milliGRAM(s) Oral at bedtime  pantoprazole Infusion 8 mG/Hr IV Continuous <Continuous>  pentoxifylline 400 milliGRAM(s) Oral three times a day  sodium chloride 0.9%. 1000 milliLiter(s) IV Continuous <Continuous>      PMHX/PSHX:  Colon cancer  Breast cancer  HLD (hyperlipidemia)  HTN (hypertension)  Pacemaker  Colon cancer  Breast cancer  HTN (hypertension)  Renal insufficiency  Atrial fibrillation  S/P cataract surgery  S/P vascular bypass  H/O mastectomy  S/P hysterectomy  S/P colon resection  S/P hysterectomy  S/P colon resection  S/P mastectomy  S/P peripheral artery bypass      Family history:  FH: smoking  No pertinent family history in first degree relatives      Social History:     ROS:     General:  see HPI  Eyes:  Good vision, no reported pain  ENT:  No sore throat, pain, runny nose, dysphagia  CV:  No pain, palpitations, hypo/hypertension  Resp:  No dyspnea, cough, tachypnea, wheezing  GI:  see HPI  :  No pain, bleeding, incontinence, nocturia  Muscle: No pain  Neuro: no tingling, memory problems  Psych:  No insomnia, mood problems, depression  Endocrine:  No polyuria, polydipsia, cold/heat intolerance  Heme:  No petechiae, ecchymosis, easy bruisability  Skin:  No rash, edema      PHYSICAL EXAM:     GENERAL:  Appears stated age, well-groomed, well-nourished, no distress  HEENT:  NC/AT,  conjunctivae clear and pink, no thyromegaly, nodules, adenopathy, no JVD, sclera -anicteric  CHEST:  Full & symmetric excursion, no increased effort, breath sounds clear  HEART:  Regular rhythm, S1, S2, no murmur/rub/S3/S4, no abdominal bruit, no edema  ABDOMEN:  Soft, non-tender, non-distended, normoactive bowel sounds,  no masses ,no hepato-splenomegaly, no signs of chronic liver disease, faint midline scar  EXTEREMITIES:  no cyanosis,clubbing or edema  SKIN:  No rash/erythema/ecchymoses/petechiae/wounds/abscess/warm/dry  NEURO:  Alert, oriented, no asterixis, no tremor, no encephalopathy    Vital Signs:  Vital Signs Last 24 Hrs  T(C): 37 (07 Aug 2019 04:42), Max: 37 (07 Aug 2019 04:42)  T(F): 98.6 (07 Aug 2019 04:42), Max: 98.6 (07 Aug 2019 04:42)  HR: 73 (07 Aug 2019 04:42) (73 - 94)  BP: 136/67 (07 Aug 2019 04:42) (124/66 - 167/78)  BP(mean): --  RR: 18 (07 Aug 2019 04:42) (18 - 19)  SpO2: 97% (07 Aug 2019 04:42) (96% - 99%)  Daily Height in cm: 157.48 (07 Aug 2019 00:52)    Daily     LABS:                        7.4    7.39  )-----------( 261      ( 07 Aug 2019 08:36 )             23.0     Mean Cell Volume: 92.0 fl (19 @ 08:36)        145  |  116<H>  |  38<H>  ----------------------------<  84  3.5   |  17<L>  |  1.19    Ca    8.3<L>      07 Aug 2019 06:03    TPro  6.1  /  Alb  3.7  /  TBili  0.2  /  DBili  x   /  AST  10  /  ALT  7<L>  /  AlkPhos  51  08-06    LIVER FUNCTIONS - ( 06 Aug 2019 17:39 )  Alb: 3.7 g/dL / Pro: 6.1 g/dL / ALK PHOS: 51 U/L / ALT: 7 U/L / AST: 10 U/L / GGT: x           PT/INR - ( 05 Aug 2019 19:06 )   PT: 13.3 SEC;   INR: 1.16          PTT - ( 05 Aug 2019 19:06 )  PTT:28.2 SEC  Urinalysis Basic - ( 06 Aug 2019 19:49 )    Color: Yellow / Appearance: Clear / S.039 / pH: x  Gluc: x / Ketone: Negative  / Bili: Negative / Urobili: Negative   Blood: x / Protein: Trace / Nitrite: Negative   Leuk Esterase: Negative / RBC: 1 /hpf / WBC 1 /HPF   Sq Epi: x / Non Sq Epi: 1 /hpf / Bacteria: Negative      Amylase Serum--      Lipase serum36       Ammonia--                          7.4    7.39  )-----------( 261      ( 07 Aug 2019 08:36 )             23.0                         9.4    10.1  )-----------( 301      ( 06 Aug 2019 17:39 )             27.9                         10.3   11.11 )-----------( 355      ( 05 Aug 2019 19:06 )             30.9     Imaging:

## 2019-08-07 NOTE — H&P ADULT - NSHPSOCIALHISTORY_GEN_ALL_CORE
The patient lives alone.  She uses a walker while outside her home for grocery shopping.  She is a former smoker; she quit after 47 years of smoking.   She drinks wine with dinner.

## 2019-08-07 NOTE — H&P ADULT - ASSESSMENT
This patient is a 89yoF with PMH of breast CA, colon CA, htn, hLd, ?afib s/p ppm,   who presents to the ED with complaint of abdominal pain and generalized weakness, found on recent CT to have abnormal duodenal findings suggestive of duodenitis vs ulcer, and downtrending Hgb concerning for occult GIB.

## 2019-08-07 NOTE — H&P ADULT - NSICDXPASTMEDICALHX_GEN_ALL_CORE_FT
PAST MEDICAL HISTORY:  Breast cancer     Colon cancer     HLD (hyperlipidemia)     HTN (hypertension)

## 2019-08-07 NOTE — PHARMACOTHERAPY INTERVENTION NOTE - COMMENTS
Medication reconciliation completed per pharmacy. Please refer to outpatient medication review for the updated list.    Yaakov Cantu, Pharmacy Intern  Grecia Jenkins, PharmD  PGY-1 Pharmacy Resident  [phone]

## 2019-08-07 NOTE — H&P ADULT - NSHPLABSRESULTS_GEN_ALL_CORE
Labs, imaging and EKG personally reviewed by me.   CBC found anemia with Hgb of 9.4. Hemoglobin was 13 in May 2019.     Serum K low at 3.3. Serum chloride elevated at 110. BUN and creatinine elevated at 56 and 1.32 respectively.    UA found trace protein.   Lipase was elevated at 66.3 at [facility].     LABS:                        9.4    10.1  )-----------( 301      ( 06 Aug 2019 17:39 )             27.9     Hgb Trend: 9.4<--  08-    145  |  110<H>  |  56<H>  ----------------------------<  103<H>  3.3<L>   |  21<L>  |  1.32<H>    Ca    9.2      06 Aug 2019 17:39    TPro  6.1  /  Alb  3.7  /  TBili  0.2  /  DBili  x   /  AST  10  /  ALT  7<L>  /  AlkPhos  51      Creatinine Trend: 1.32<--    Urinalysis Basic - ( 06 Aug 2019 19:49 )    Color: Yellow / Appearance: Clear / S.039 / pH: x  Gluc: x / Ketone: Negative  / Bili: Negative / Urobili: Negative   Blood: x / Protein: Trace / Nitrite: Negative   Leuk Esterase: Negative / RBC: 1 /hpf / WBC 1 /HPF   Sq Epi: x / Non Sq Epi: 1 /hpf / Bacteria: Negative    OBTAINED FROM [facility] ADMISSION  FINDINGS:    LOWER CHEST: Bibasilar linear atelectasis. Partially imaged cardiac leads.    LIVER: Within normal limits.  BILE DUCTS: Normal caliber.  GALLBLADDER: Nondistended gallbladder. There is vicarious excretion of contrast. Cholelithiasis.  SPLEEN: Within normal limits.  PANCREAS: Within normal limits.  ADRENALS: Redemonstration of a right adrenal nodule measuring approximately 15 mm. Unremarkable left adrenal gland.  KIDNEYS/URETERS: Bilateral renal cysts and low-attenuation lesions too small to accurately characterize. Nonobstructing left upper pole renal calculus. No hydronephrosis. Symmetric parenchymal enhancement.    BLADDER: Within normal limits.  REPRODUCTIVE ORGANS: Status post hysterectomy.    BOWEL: Focal inflammatory change, which appears to be centered on the first portion of the duodenum. No bowel obstruction. Status post sigmoid colonic anastomosis. Colonic diverticulosis.  PERITONEUM: No discrete extraluminal gas or drainable collection.  VESSELS: Extensive atherosclerosis of the abdominal aorta and its branch vessels. Occlusion of the left iliac artery. Status post femorofemoral bypass with limited evaluation of the bypass graft  RETROPERITONEUM/LYMPH NODES: No lymphadenopathy.    ABDOMINAL WALL: Within normal limits.  BONES: Old left lower rib fracture deformities. Dextroscoliosis of the lumbar spine with multilevel degenerative change.    IMPRESSION:     Inflammatory change centered on the proximal duodenum. Findings may reflect underlying duodenitis/ peptic ulcer disease. No discrete extraluminal gas or drainable collection. Further evaluation with  endoscopy and/or upper GI series may be obtained as clinically warranted.    Cholelithiasis.    RUQ Ultrasound  INTERPRETATION:  CLINICAL INFORMATION: Right upper quadrant pain nausea   and vomiting.    COMPARISON: CT of the chest abdomen pelvis acquired 2017.    TECHNIQUE: Sonography of the abdomen.     FINDINGS:    Liver: Within normal limits.    Bile ducts: Normal caliber. Common bile duct measures 4 mm.     Gallbladder: Cholelithiasis in a contracted gallbladder. Negative sonographic Espinal sign.    Pancreas: Limited visualization.    Right kidney: 8.5 cm. No hydronephrosis. Interpolar cyst is present measuring 1.1 cm in greatest dimension.    Ascites: None.    Aorta and IVC: Visualized portions are within normal limits.    IMPRESSION:   Cholelithiasis, in a contracted gallbladder. No wall thickening, surrounding fluid or Espinal sign to suggest acute cholecystitis.    < from: CT Head No Cont (19 @ 22:02) >    IMPRESSION:   No CT evidence of acute intracranial hemorrhage, mass effect or acute territorial infarct.  If neurologic symptoms persist, repeat head CT in twenty-four hours or a follow-up MRI should be obtained.     < end of copied text >

## 2019-08-07 NOTE — CONSULT NOTE ADULT - ASSESSMENT
89F with history of breast CA, colon CA s/p resection in 1993, htn, hLd, afib s/p ppm - not on AC per chart 2/2 GIB, on baby aspirin who presented to the ED with complaint of abdominal pain and generalized weakness found to have duodenitis on CT with symptomatic anemia.    Impression:  - normocytic anemia  - duodenitis  - given hx and above findings ddx includes PUD, esophagitis, gastritis, AVM, malignancy    Recommendation:  - keep patient NPO, hold asa  - c/w PPI gtt  - maintain 2 peripheral IVs, active type and screen  - discussed with patient need for EGD to evaluate for source of bleeding - patient agreeable  - plan for EGD today 8/7

## 2019-08-07 NOTE — H&P ADULT - HISTORY OF PRESENT ILLNESS
In the ED T 97.7F, HR 76, /66, RR 19, SpO2 99%RA   Pt was given protonix 80mg IVP, flagyl 500mg PO, KCl 40mEq, protonix gtt, IVNS 1500cc.    CBC found anemia with Hgb of 9.4. Hemoglobin was 13 in May 2019.     Serum K low at 3.3. Serum chloride elevated at 110. BUN and creatinine elevated at 56 and 1.32 respectively.    UA found trace protein.   Lipase was elevated at 66.3 at [facility].     CT- n oinfarct, This patient is a 89yoF with PMH of breast CA, colon CA, htn, hLd, ?afib s/p ppm,   who presents to the ED with complaint of abdominal pain and generalized weakness. The patient has had nearly constant RUQ abdominal pain over the last 2-3 weeks with radiation to the back. She describes the pain as constant, "cutting" in quality, with 10/10 in severity. She has had associated nausea and decreased PO intake, but denies vomiting. She also had constipation- states her last BM was about a week ago, and is unsure if she had any melena or hematochezia. Patient endorses she may have lost weight, but cannot quantify. She visited [facility] for evaluation of her abdominal pain and was discharged home earlier today with prescriptions for cipro and flagyl. While at [facility], patient had episode of presyncope while using the restroom. Pt complains she feels afraid of ambulating due to sensation of imbalance and dizziness. She denies any CP, palpitations, SOB.    Upon returning home, the patient was visited by her neighbor who found that the patient had persistent generalized weakness. Pt was advised to return to the ED by her PMD.     In the ED T 97.7F, HR 76, /66, RR 19, SpO2 99%RA   Pt was given protonix 80mg IVP, flagyl 500mg PO, KCl 40mEq, protonix gtt, IVNS 1500cc. This patient is a 89yoF with PMH of breast CA, colon CA, htn, hLd, ?afib s/p ppm, who presents to the ED with complaint of abdominal pain and generalized weakness. The patient has had nearly constant RUQ abdominal pain over the last 2-3 weeks with radiation to the back. She describes the pain as constant, "cutting" in quality, with 10/10 in severity. She has had associated nausea and decreased PO intake, but denies vomiting. She also had constipation- states her last BM was about a week ago, and is unsure if she had any melena or hematochezia. Patient endorses she may have lost weight, but cannot quantify. She visited [facility] for evaluation of her abdominal pain and was discharged home earlier today with prescriptions for cipro and flagyl. While at [facility], patient had episode of presyncope while using the restroom. Pt complains she feels afraid of ambulating due to sensation of imbalance and dizziness. She denies any CP, palpitations, SOB.    Upon returning home, the patient was visited by her neighbor who found that the patient had persistent generalized weakness. Pt was advised to return to the ED by her PMD.   OF note, patient cannot recall why she has ppm, and denies history of atrial fibrillation, although it was documented in EMR.     In the ED T 97.7F, HR 76, /66, RR 19, SpO2 99%RA   Pt was given protonix 80mg IVP, flagyl 500mg PO, KCl 40mEq, protonix gtt, IVNS 1500cc.

## 2019-08-07 NOTE — H&P ADULT - PROBLEM SELECTOR PLAN 3
Pt had episode of presyncope while using the restroom. It is unclear if this may have been due to orthostatic hypotension vs micturition syncope.  EKG ordered.  Check orthostatic BP.

## 2019-08-07 NOTE — H&P ADULT - NSICDXPASTSURGICALHX_GEN_ALL_CORE_FT
PAST SURGICAL HISTORY:  H/O mastectomy     S/P cataract surgery     S/P colon resection     S/P hysterectomy     S/P vascular bypass

## 2019-08-07 NOTE — H&P ADULT - NSHPREVIEWOFSYSTEMS_GEN_ALL_CORE
REVIEW OF SYSTEMS  CONSTITUTIONAL: No fever, no chills, +fatigue  EYES: No eye pain, no vision changes  ENMT:  No difficulty hearing, no throat pain  RESPIRATORY: No cough, no wheezing, no sputum production; No shortness of breath  CARDIOVASCULAR: No chest pain, no palpitations, no leg swelling  GASTROINTESTINAL:  + R sided abdominal pain, + nausea, no vomiting, no hematemesis, no diarrhea, no constipation  GENITOURINARY: No dysuria, no hematuria  NEUROLOGICAL: No headaches, no loss of strength, no numbness, + dizziness and lightheadedness  SKIN: No itching, no rashes, no lesions   MUSCULOSKELETAL: No joint pain, no joint swelling; No muscle pain  HEME/LYMPH: No easy bruising, bleeding

## 2019-08-07 NOTE — H&P ADULT - PROBLEM SELECTOR PLAN 1
Patient had recent ultrasound for RUQ pain which identified cholelithiasis without gallbladder wall thickening, surrounding fluid or Espinal sign.  Patient's CT revealed inflammatory change centered on the proximal duodenum; may reflect underlying duodenitis/ peptic ulcer disease.  Given absence of elevated liver enzmyes and no obstructing stones identified on imaging, choledocholithiasis or cholangitis seems unlikely.   Patient's persistent pain may be 2/2 duodenal ulcer vs duodenitis vs gastritis or dyspesia.   Start maintenance fluids. Keep NPO for now.  Will consult GI team to pursue potential upper endoscopy to assess lesion identified on imaging. Can consider HIDA of endoscopy is not diagnostic.   Will continue protonix gtt for now. Hold further antibiotics for now.

## 2019-08-07 NOTE — H&P ADULT - PROBLEM SELECTOR PLAN 2
Patient was noted to have slowly downtrending hemoglobin in labs since May 2019.  Will obtain iron levels, ferritin.   Trend CBC

## 2019-08-07 NOTE — H&P ADULT - NSHPPHYSICALEXAM_GEN_ALL_CORE
Vital Signs Last 24 Hrs  T(C): 36.7 (07 Aug 2019 00:52), Max: 36.7 (06 Aug 2019 23:20)  T(F): 98 (07 Aug 2019 00:52), Max: 98 (06 Aug 2019 23:20)  HR: 80 (07 Aug 2019 00:52) (76 - 94)  BP: 167/78 (07 Aug 2019 00:52) (124/66 - 167/78)  BP(mean): --  RR: 18 (07 Aug 2019 00:52) (18 - 19)  SpO2: 96% (07 Aug 2019 00:52) (96% - 99%)    PHYSICAL EXAM:  GENERAL: NAD, well-groomed, well-developed  HEAD:  Atraumatic, Normocephalic  EYES: EOMI, PERRLA, conjunctiva and sclera clear  ENMT: No oropharyngeal exudates, erythema or lesions,  Moist mucous membranes, good dentition  NECK: Supple, no cervical lymphadenopathy, no JVD  NERVOUS SYSTEM:  Alert & Oriented X3, CN II-XII intact, 5/5 BUE and BLE motor strength, full sensation to light touch   CHEST/LUNG: Clear to auscultation bilaterally; No rales, no  rhonchi, no wheezing  HEART: Regular rate and rhythm; No murmurs, rubs, or gallops  ABDOMEN: Soft, tender RUQ + Espinal's sign, no palpable masses,   EXTREMITIES:  2+ Peripheral Pulses, No clubbing, cyanosis, or edema  SKIN: No rashes or lesions

## 2019-08-08 DIAGNOSIS — D62 ACUTE POSTHEMORRHAGIC ANEMIA: ICD-10-CM

## 2019-08-08 DIAGNOSIS — K26.9 DUODENAL ULCER, UNSPECIFIED AS ACUTE OR CHRONIC, WITHOUT HEMORRHAGE OR PERFORATION: ICD-10-CM

## 2019-08-08 LAB
ANION GAP SERPL CALC-SCNC: 14 MMOL/L — SIGNIFICANT CHANGE UP (ref 5–17)
BUN SERPL-MCNC: 20 MG/DL — SIGNIFICANT CHANGE UP (ref 7–23)
CALCIUM SERPL-MCNC: 8.8 MG/DL — SIGNIFICANT CHANGE UP (ref 8.4–10.5)
CHLORIDE SERPL-SCNC: 112 MMOL/L — HIGH (ref 96–108)
CO2 SERPL-SCNC: 19 MMOL/L — LOW (ref 22–31)
CREAT SERPL-MCNC: 1.04 MG/DL — SIGNIFICANT CHANGE UP (ref 0.5–1.3)
GLUCOSE SERPL-MCNC: 91 MG/DL — SIGNIFICANT CHANGE UP (ref 70–99)
HCT VFR BLD CALC: 25.6 % — LOW (ref 34.5–45)
HGB BLD-MCNC: 8.5 G/DL — LOW (ref 11.5–15.5)
MCHC RBC-ENTMCNC: 29.7 PG — SIGNIFICANT CHANGE UP (ref 27–34)
MCHC RBC-ENTMCNC: 33.1 GM/DL — SIGNIFICANT CHANGE UP (ref 32–36)
MCV RBC AUTO: 89.9 FL — SIGNIFICANT CHANGE UP (ref 80–100)
PLATELET # BLD AUTO: 303 K/UL — SIGNIFICANT CHANGE UP (ref 150–400)
POTASSIUM SERPL-MCNC: 3.7 MMOL/L — SIGNIFICANT CHANGE UP (ref 3.5–5.3)
POTASSIUM SERPL-SCNC: 3.7 MMOL/L — SIGNIFICANT CHANGE UP (ref 3.5–5.3)
RBC # BLD: 2.84 M/UL — LOW (ref 3.8–5.2)
RBC # FLD: 12.3 % — SIGNIFICANT CHANGE UP (ref 10.3–14.5)
SODIUM SERPL-SCNC: 145 MMOL/L — SIGNIFICANT CHANGE UP (ref 135–145)
SURGICAL PATHOLOGY STUDY: SIGNIFICANT CHANGE UP
WBC # BLD: 7.4 K/UL — SIGNIFICANT CHANGE UP (ref 3.8–10.5)
WBC # FLD AUTO: 7.4 K/UL — SIGNIFICANT CHANGE UP (ref 3.8–10.5)

## 2019-08-08 PROCEDURE — 99232 SBSQ HOSP IP/OBS MODERATE 35: CPT | Mod: GC

## 2019-08-08 PROCEDURE — 99233 SBSQ HOSP IP/OBS HIGH 50: CPT

## 2019-08-08 RX ORDER — AMLODIPINE BESYLATE 2.5 MG/1
1 TABLET ORAL
Qty: 0 | Refills: 0 | DISCHARGE

## 2019-08-08 RX ORDER — AMLODIPINE BESYLATE 2.5 MG/1
10 TABLET ORAL
Qty: 0 | Refills: 0 | DISCHARGE

## 2019-08-08 RX ORDER — PENTOXIFYLLINE 400 MG
1 TABLET, EXTENDED RELEASE ORAL
Qty: 0 | Refills: 0 | DISCHARGE

## 2019-08-08 RX ORDER — ATORVASTATIN CALCIUM 80 MG/1
40 TABLET, FILM COATED ORAL
Qty: 0 | Refills: 0 | DISCHARGE

## 2019-08-08 RX ORDER — ATORVASTATIN CALCIUM 80 MG/1
1 TABLET, FILM COATED ORAL
Qty: 0 | Refills: 0 | DISCHARGE

## 2019-08-08 RX ADMIN — PANTOPRAZOLE SODIUM 10 MG/HR: 20 TABLET, DELAYED RELEASE ORAL at 21:15

## 2019-08-08 RX ADMIN — AMLODIPINE BESYLATE 10 MILLIGRAM(S): 2.5 TABLET ORAL at 06:36

## 2019-08-08 RX ADMIN — Medication 400 MILLIGRAM(S): at 06:36

## 2019-08-08 RX ADMIN — Medication 400 MILLIGRAM(S): at 21:15

## 2019-08-08 RX ADMIN — Medication 400 MILLIGRAM(S): at 14:05

## 2019-08-08 RX ADMIN — ATORVASTATIN CALCIUM 40 MILLIGRAM(S): 80 TABLET, FILM COATED ORAL at 21:15

## 2019-08-08 RX ADMIN — PANTOPRAZOLE SODIUM 10 MG/HR: 20 TABLET, DELAYED RELEASE ORAL at 06:36

## 2019-08-08 RX ADMIN — PANTOPRAZOLE SODIUM 10 MG/HR: 20 TABLET, DELAYED RELEASE ORAL at 14:05

## 2019-08-08 NOTE — PHYSICAL THERAPY INITIAL EVALUATION ADULT - ADDITIONAL COMMENTS
(continuation of H&P) CT revealed inflammatory change centered on the proximal duodenum. (continuation of H&P) CT revealed inflammatory change centered on the proximal duodenum.  Pt lives in an apt with no steps to enter. Pt has RW, rollator and cane but used nothing inside and Rollator for outdoors.

## 2019-08-08 NOTE — PHYSICAL THERAPY INITIAL EVALUATION ADULT - PRECAUTIONS/LIMITATIONS, REHAB EVAL
"Hospital/TCU/ED for chronic condition Discharge Protocol    \"Hi, my name is Lynne Epperson, a registered nurse, and I am calling from [facility].  I am calling to follow up and see how things are going for you after your recent emergency visit/hospital/TCU stay.\"    Tell me how you are doing now that you are home?\" just fine      Discharge Instructions    \"Let's review your discharge instructions.  What is/are the follow-up recommendations?  Pt. Response: He is to see us and have labs and then surgeon    \"Has an appointment with your primary care provider been scheduled?\"   Yes. (confirm)    \"When you see the provider, I would recommend that you bring your medications with you.\"    Medications    \"Tell me what changed about your medicines when you discharged?\"    Changes to chronic meds?    0-1    \"What questions do you have about your medications?\"    None     New diagnoses of heart failure, COPD, diabetes, or MI?    No              Medication reconciliation completed? Yes  Was MTM referral placed (*Make sure to put transitions as reason for referral)?   No    Call Summary    \"What questions or concerns do you have about your recent visit and your follow-up care?\"     none    \"If you have questions or things don't continue to improve, we encourage you contact us through the main clinic number (give number).  Even if the clinic is not open, triage nurses are available 24/7 to help you.     We would like you to know that our clinic has extended hours (provide information).  We also have urgent care (provide details on closest location and hours/contact info)\"      \"Thank you for your time and take care!\"             " +orthostatic

## 2019-08-09 LAB
ANION GAP SERPL CALC-SCNC: 12 MMOL/L — SIGNIFICANT CHANGE UP (ref 5–17)
BASOPHILS # BLD AUTO: 0.07 K/UL — SIGNIFICANT CHANGE UP (ref 0–0.2)
BASOPHILS NFR BLD AUTO: 1 % — SIGNIFICANT CHANGE UP (ref 0–2)
BUN SERPL-MCNC: 20 MG/DL — SIGNIFICANT CHANGE UP (ref 7–23)
CALCIUM SERPL-MCNC: 8.4 MG/DL — SIGNIFICANT CHANGE UP (ref 8.4–10.5)
CHLORIDE SERPL-SCNC: 113 MMOL/L — HIGH (ref 96–108)
CO2 SERPL-SCNC: 18 MMOL/L — LOW (ref 22–31)
CREAT SERPL-MCNC: 1.28 MG/DL — SIGNIFICANT CHANGE UP (ref 0.5–1.3)
EOSINOPHIL # BLD AUTO: 0.25 K/UL — SIGNIFICANT CHANGE UP (ref 0–0.5)
EOSINOPHIL NFR BLD AUTO: 3.6 % — SIGNIFICANT CHANGE UP (ref 0–6)
GLUCOSE SERPL-MCNC: 95 MG/DL — SIGNIFICANT CHANGE UP (ref 70–99)
HCT VFR BLD CALC: 24.3 % — LOW (ref 34.5–45)
HGB BLD-MCNC: 8 G/DL — LOW (ref 11.5–15.5)
IMM GRANULOCYTES NFR BLD AUTO: 0.7 % — SIGNIFICANT CHANGE UP (ref 0–1.5)
LYMPHOCYTES # BLD AUTO: 1.1 K/UL — SIGNIFICANT CHANGE UP (ref 1–3.3)
LYMPHOCYTES # BLD AUTO: 15.9 % — SIGNIFICANT CHANGE UP (ref 13–44)
MCHC RBC-ENTMCNC: 30.2 PG — SIGNIFICANT CHANGE UP (ref 27–34)
MCHC RBC-ENTMCNC: 32.9 GM/DL — SIGNIFICANT CHANGE UP (ref 32–36)
MCV RBC AUTO: 91.7 FL — SIGNIFICANT CHANGE UP (ref 80–100)
MONOCYTES # BLD AUTO: 0.59 K/UL — SIGNIFICANT CHANGE UP (ref 0–0.9)
MONOCYTES NFR BLD AUTO: 8.5 % — SIGNIFICANT CHANGE UP (ref 2–14)
NEUTROPHILS # BLD AUTO: 4.87 K/UL — SIGNIFICANT CHANGE UP (ref 1.8–7.4)
NEUTROPHILS NFR BLD AUTO: 70.3 % — SIGNIFICANT CHANGE UP (ref 43–77)
PLATELET # BLD AUTO: 281 K/UL — SIGNIFICANT CHANGE UP (ref 150–400)
POTASSIUM SERPL-MCNC: 3.4 MMOL/L — LOW (ref 3.5–5.3)
POTASSIUM SERPL-SCNC: 3.4 MMOL/L — LOW (ref 3.5–5.3)
RBC # BLD: 2.65 M/UL — LOW (ref 3.8–5.2)
RBC # FLD: 13.9 % — SIGNIFICANT CHANGE UP (ref 10.3–14.5)
SODIUM SERPL-SCNC: 143 MMOL/L — SIGNIFICANT CHANGE UP (ref 135–145)
WBC # BLD: 6.93 K/UL — SIGNIFICANT CHANGE UP (ref 3.8–10.5)
WBC # FLD AUTO: 6.93 K/UL — SIGNIFICANT CHANGE UP (ref 3.8–10.5)

## 2019-08-09 PROCEDURE — 99233 SBSQ HOSP IP/OBS HIGH 50: CPT

## 2019-08-09 PROCEDURE — 99232 SBSQ HOSP IP/OBS MODERATE 35: CPT | Mod: GC

## 2019-08-09 RX ORDER — POTASSIUM CHLORIDE 20 MEQ
20 PACKET (EA) ORAL
Refills: 0 | Status: COMPLETED | OUTPATIENT
Start: 2019-08-09 | End: 2019-08-09

## 2019-08-09 RX ORDER — SODIUM CHLORIDE 9 MG/ML
500 INJECTION INTRAMUSCULAR; INTRAVENOUS; SUBCUTANEOUS ONCE
Refills: 0 | Status: COMPLETED | OUTPATIENT
Start: 2019-08-09 | End: 2019-08-09

## 2019-08-09 RX ADMIN — Medication 400 MILLIGRAM(S): at 21:41

## 2019-08-09 RX ADMIN — SODIUM CHLORIDE 500 MILLILITER(S): 9 INJECTION INTRAMUSCULAR; INTRAVENOUS; SUBCUTANEOUS at 10:04

## 2019-08-09 RX ADMIN — Medication 400 MILLIGRAM(S): at 13:34

## 2019-08-09 RX ADMIN — AMLODIPINE BESYLATE 10 MILLIGRAM(S): 2.5 TABLET ORAL at 05:48

## 2019-08-09 RX ADMIN — ATORVASTATIN CALCIUM 40 MILLIGRAM(S): 80 TABLET, FILM COATED ORAL at 21:41

## 2019-08-09 RX ADMIN — Medication 20 MILLIEQUIVALENT(S): at 11:46

## 2019-08-09 RX ADMIN — Medication 400 MILLIGRAM(S): at 05:47

## 2019-08-09 RX ADMIN — Medication 20 MILLIEQUIVALENT(S): at 13:34

## 2019-08-10 ENCOUNTER — TRANSCRIPTION ENCOUNTER (OUTPATIENT)
Age: 84
End: 2019-08-10

## 2019-08-10 DIAGNOSIS — K26.9 DUODENAL ULCER, UNSPECIFIED AS ACUTE OR CHRONIC, WITHOUT HEMORRHAGE OR PERFORATION: ICD-10-CM

## 2019-08-10 LAB
ANION GAP SERPL CALC-SCNC: 12 MMOL/L — SIGNIFICANT CHANGE UP (ref 5–17)
BASOPHILS # BLD AUTO: 0.06 K/UL — SIGNIFICANT CHANGE UP (ref 0–0.2)
BASOPHILS NFR BLD AUTO: 0.7 % — SIGNIFICANT CHANGE UP (ref 0–2)
BUN SERPL-MCNC: 20 MG/DL — SIGNIFICANT CHANGE UP (ref 7–23)
CALCIUM SERPL-MCNC: 8.5 MG/DL — SIGNIFICANT CHANGE UP (ref 8.4–10.5)
CHLORIDE SERPL-SCNC: 113 MMOL/L — HIGH (ref 96–108)
CO2 SERPL-SCNC: 15 MMOL/L — LOW (ref 22–31)
CREAT SERPL-MCNC: 1.2 MG/DL — SIGNIFICANT CHANGE UP (ref 0.5–1.3)
EOSINOPHIL # BLD AUTO: 0.22 K/UL — SIGNIFICANT CHANGE UP (ref 0–0.5)
EOSINOPHIL NFR BLD AUTO: 2.6 % — SIGNIFICANT CHANGE UP (ref 0–6)
GLUCOSE SERPL-MCNC: 84 MG/DL — SIGNIFICANT CHANGE UP (ref 70–99)
HCT VFR BLD CALC: 25.8 % — LOW (ref 34.5–45)
HGB BLD-MCNC: 8.2 G/DL — LOW (ref 11.5–15.5)
IMM GRANULOCYTES NFR BLD AUTO: 1.2 % — SIGNIFICANT CHANGE UP (ref 0–1.5)
LYMPHOCYTES # BLD AUTO: 0.76 K/UL — LOW (ref 1–3.3)
LYMPHOCYTES # BLD AUTO: 9 % — LOW (ref 13–44)
MCHC RBC-ENTMCNC: 29.9 PG — SIGNIFICANT CHANGE UP (ref 27–34)
MCHC RBC-ENTMCNC: 31.8 GM/DL — LOW (ref 32–36)
MCV RBC AUTO: 94.2 FL — SIGNIFICANT CHANGE UP (ref 80–100)
MONOCYTES # BLD AUTO: 0.84 K/UL — SIGNIFICANT CHANGE UP (ref 0–0.9)
MONOCYTES NFR BLD AUTO: 10 % — SIGNIFICANT CHANGE UP (ref 2–14)
NEUTROPHILS # BLD AUTO: 6.43 K/UL — SIGNIFICANT CHANGE UP (ref 1.8–7.4)
NEUTROPHILS NFR BLD AUTO: 76.5 % — SIGNIFICANT CHANGE UP (ref 43–77)
PLATELET # BLD AUTO: 268 K/UL — SIGNIFICANT CHANGE UP (ref 150–400)
POTASSIUM SERPL-MCNC: 4.4 MMOL/L — SIGNIFICANT CHANGE UP (ref 3.5–5.3)
POTASSIUM SERPL-SCNC: 4.4 MMOL/L — SIGNIFICANT CHANGE UP (ref 3.5–5.3)
RBC # BLD: 2.74 M/UL — LOW (ref 3.8–5.2)
RBC # FLD: 14.4 % — SIGNIFICANT CHANGE UP (ref 10.3–14.5)
SODIUM SERPL-SCNC: 140 MMOL/L — SIGNIFICANT CHANGE UP (ref 135–145)
WBC # BLD: 8.41 K/UL — SIGNIFICANT CHANGE UP (ref 3.8–10.5)
WBC # FLD AUTO: 8.41 K/UL — SIGNIFICANT CHANGE UP (ref 3.8–10.5)

## 2019-08-10 PROCEDURE — 99232 SBSQ HOSP IP/OBS MODERATE 35: CPT

## 2019-08-10 RX ORDER — DICLOFENAC SODIUM 75 MG/1
100 TABLET, DELAYED RELEASE ORAL
Qty: 0 | Refills: 0 | DISCHARGE

## 2019-08-10 RX ORDER — POLYETHYLENE GLYCOL 3350 17 G/17G
17 POWDER, FOR SOLUTION ORAL ONCE
Refills: 0 | Status: COMPLETED | OUTPATIENT
Start: 2019-08-10 | End: 2019-08-10

## 2019-08-10 RX ORDER — PANTOPRAZOLE SODIUM 20 MG/1
1 TABLET, DELAYED RELEASE ORAL
Qty: 0 | Refills: 0 | DISCHARGE
Start: 2019-08-10

## 2019-08-10 RX ORDER — PANTOPRAZOLE SODIUM 20 MG/1
1 TABLET, DELAYED RELEASE ORAL
Qty: 60 | Refills: 0
Start: 2019-08-10 | End: 2019-09-08

## 2019-08-10 RX ORDER — PANTOPRAZOLE SODIUM 20 MG/1
40 TABLET, DELAYED RELEASE ORAL
Refills: 0 | Status: DISCONTINUED | OUTPATIENT
Start: 2019-08-10 | End: 2019-08-12

## 2019-08-10 RX ORDER — ACETAMINOPHEN 500 MG
1 TABLET ORAL
Qty: 0 | Refills: 0 | DISCHARGE

## 2019-08-10 RX ORDER — PANTOPRAZOLE SODIUM 20 MG/1
40 TABLET, DELAYED RELEASE ORAL
Refills: 0 | Status: DISCONTINUED | OUTPATIENT
Start: 2019-08-10 | End: 2019-08-10

## 2019-08-10 RX ADMIN — Medication 400 MILLIGRAM(S): at 15:08

## 2019-08-10 RX ADMIN — PANTOPRAZOLE SODIUM 40 MILLIGRAM(S): 20 TABLET, DELAYED RELEASE ORAL at 05:43

## 2019-08-10 RX ADMIN — AMLODIPINE BESYLATE 10 MILLIGRAM(S): 2.5 TABLET ORAL at 05:43

## 2019-08-10 RX ADMIN — ATORVASTATIN CALCIUM 40 MILLIGRAM(S): 80 TABLET, FILM COATED ORAL at 22:47

## 2019-08-10 RX ADMIN — PANTOPRAZOLE SODIUM 40 MILLIGRAM(S): 20 TABLET, DELAYED RELEASE ORAL at 18:53

## 2019-08-10 RX ADMIN — Medication 400 MILLIGRAM(S): at 05:43

## 2019-08-10 RX ADMIN — POLYETHYLENE GLYCOL 3350 17 GRAM(S): 17 POWDER, FOR SOLUTION ORAL at 22:47

## 2019-08-10 RX ADMIN — Medication 400 MILLIGRAM(S): at 22:47

## 2019-08-10 NOTE — DISCHARGE NOTE PROVIDER - CARE PROVIDERS DIRECT ADDRESSES
,DirectAddress_Unknown,[email or web address]

## 2019-08-10 NOTE — DISCHARGE NOTE PROVIDER - NSDCCPCAREPLAN_GEN_ALL_CORE_FT
PRINCIPAL DISCHARGE DIAGNOSIS  Diagnosis: GIB (gastrointestinal bleeding)  Assessment and Plan of Treatment: There are 2 common types of GI Bleed, Upper GI Bleed and Lower GI Bleed.  Upper GI Bleed affects the esophagus, stomach, and first part of the small intestine. Lower GI Bleed affects the colon and rectum.  Upper GI Bleed signs and symptoms to notify your Health Care Provider are vomiting blood, or coffee ground vomitus, and bowel movements that look like black tar.  Lower GI Bleed signs and symptoms to notify your health care provider are bright red bloody bowel movements.   Take your medications as prescribed by your Gastroenterologist.  If you have had an Endoscopy or Colonoscopy, follow up with your Gastroenterologist for Pathology results.  Avoid NSAIDs unless your Health Care Provider tells you that it is ok (Aspirin, Ibuprofen, Advil, Motrin, Aleve).  Follow up with your Gastroenterologist within 1-2 weeks of discharge.      SECONDARY DISCHARGE DIAGNOSES  Diagnosis: Duodenal ulcer  Assessment and Plan of Treatment: Continue medications as prescribed   Follow up with Gastroenterologist as outpatient PRINCIPAL DISCHARGE DIAGNOSIS  Diagnosis: GIB (gastrointestinal bleeding)  Assessment and Plan of Treatment: There are 2 common types of GI Bleed, Upper GI Bleed and Lower GI Bleed.  Upper GI Bleed affects the esophagus, stomach, and first part of the small intestine. Lower GI Bleed affects the colon and rectum.  Upper GI Bleed signs and symptoms to notify your Health Care Provider are vomiting blood, or coffee ground vomitus, and bowel movements that look like black tar.  Lower GI Bleed signs and symptoms to notify your health care provider are bright red bloody bowel movements.   Take your medications as prescribed by your Gastroenterologist.  If you have had an Endoscopy or Colonoscopy, follow up with your Gastroenterologist for Pathology results.  Avoid NSAIDs unless your Health Care Provider tells you that it is ok (Aspirin, Ibuprofen, Advil, Motrin, Aleve).  Follow up with your Gastroenterologist within 1-2 weeks of discharge.      SECONDARY DISCHARGE DIAGNOSES  Diagnosis: Duodenal ulcer  Assessment and Plan of Treatment: Continue medications as prescribed   Follow up with Gastroenterologist as outpatient    Diagnosis: PVD (peripheral vascular disease)  Assessment and Plan of Treatment: follow up with your doctor regarding when it is safe to resume aspirin

## 2019-08-10 NOTE — DISCHARGE NOTE PROVIDER - CARE PROVIDER_API CALL
Tim Cool (DO)  Family Medicine  [address] [place] [address]  Phone: [phone]  Fax: [phone]  Follow Up Time:     Jacob Shane (MD)  Gastroenterology; Internal Medicine  [address] [place] [address] [address]  [place] [address]  Phone: [phone]  Fax: [phone]  Follow Up Time:

## 2019-08-10 NOTE — DISCHARGE NOTE PROVIDER - HOSPITAL COURSE
89yoF with PMH of breast CA, colon CA, htn, hLd, ?afib s/p ppm, who presents to the ED with complaint of abdominal pain with melena found to have actively bleeding duodenal ulcer.         s/p EGD:  Non-bleeding esophageal ulcer.                         - 3 cm hiatus hernia.                         - Erythematous mucosa in the stomach. Biopsied.                         - One non-bleeding duodenal ulcer with a flat pigmented spot (Johnnie Class                          IIc). The ulcer was very large and thus was not amenable to endoscopic                          intervention. This is the likely source of GI bleeding.    	       - If she has recurrent bleed, will need IR embolisation, given the size of                          the ulcer.        Plan: Continue PPI     H pylori and path negative    F/U GI outpt    Still with melena likely old blood but CBC stable 89yoF with PMH of breast CA, colon CA, htn, hLd, ?afib s/p ppm, who presents to the ED with complaint of abdominal pain with melena found to have actively bleeding duodenal ulcer.     s/p EGD:  Non-bleeding esophageal ulcer.- 3 cm hiatus hernia.  - Erythematous mucosa in the stomach. Biopsied. - One non-bleeding duodenal ulcer with a flat pigmented spot (Johnnie Class      IIc). The ulcer was very large and thus was not amenable to endoscopic intervention. This is the likely source of GI bleed- If she has recurrent bleed, will need IR embolisation, given the size of      the ulcer. Plan: Continue PPI H pylori and path negative F/U GI outpt 89 y/o F with PMH of breast CA, colon CA, HTN, HLD, ?afib s/p ppm, PVD, who presents to the ED with complaint of abdominal pain with melena. Patient was seen by GI, treated with PPI and underwent EGD, found to have a large duodenal ulcer as well as esophageal ulcer on EGD. Path negative for H. pylori. H/H remained stable. Patient initially had orthostatic hypotension which improved with IVF. Patient advised to follow up with PMD and GI as outpatient. 89 y/o F with PMH of breast CA, colon CA, HTN, HLD, ?afib s/p ppm, PVD, who presents to the ED with complaint of abdominal pain with melena. Patient was seen by GI, treated with PPI and underwent EGD, found to have esophageal ulcer as well as a very large duodenal ulcer which was not amenable to endoscopic  intervention. This was thought to be likely the source of GI bleeding. Path negative for H. pylori. H/H remained stable. Patient initially had orthostatic hypotension which improved with IVF. Patient advised to follow up with PMD and GI as outpatient.

## 2019-08-10 NOTE — DISCHARGE NOTE PROVIDER - NSDCFUSCHEDAPPT_GEN_ALL_CORE_FT
ROGELIO DURBIN ; 10/08/2019 ; [facility] 759-09 76ap ROGELIO DURBIN ; 10/08/2019 ; [facility] 458-68 76so ROGELIO DURBIN ; 10/08/2019 ; [facility] 744-62 76vf ROGELIO DURBIN ; 10/08/2019 ; [facility] 496-57 76mj ROGELIO DURBIN ; 10/08/2019 ; [facility] 078-44 76pq ROGELIO DURBIN ; 10/08/2019 ; [facility] 662-97 76cu ROGELIO DURBIN ; 10/08/2019 ; [facility] 607-22 76fo ROGELIO DURBIN ; 10/08/2019 ; [facility] 868-65 76oh ROGELIO DURBIN ; 10/08/2019 ; [facility] 977-03 76mo ROGELIO DURBIN ; 10/08/2019 ; [facility] 425-28 76gp

## 2019-08-11 LAB
HCT VFR BLD CALC: 26.9 % — LOW (ref 34.5–45)
HGB BLD-MCNC: 8.6 G/DL — LOW (ref 11.5–15.5)
MCHC RBC-ENTMCNC: 28.9 PG — SIGNIFICANT CHANGE UP (ref 27–34)
MCHC RBC-ENTMCNC: 32 GM/DL — SIGNIFICANT CHANGE UP (ref 32–36)
MCV RBC AUTO: 90.3 FL — SIGNIFICANT CHANGE UP (ref 80–100)
PLATELET # BLD AUTO: 295 K/UL — SIGNIFICANT CHANGE UP (ref 150–400)
RBC # BLD: 2.98 M/UL — LOW (ref 3.8–5.2)
RBC # FLD: 13.9 % — SIGNIFICANT CHANGE UP (ref 10.3–14.5)
WBC # BLD: 7.8 K/UL — SIGNIFICANT CHANGE UP (ref 3.8–10.5)
WBC # FLD AUTO: 7.8 K/UL — SIGNIFICANT CHANGE UP (ref 3.8–10.5)

## 2019-08-11 PROCEDURE — 99231 SBSQ HOSP IP/OBS SF/LOW 25: CPT

## 2019-08-11 RX ADMIN — Medication 400 MILLIGRAM(S): at 13:27

## 2019-08-11 RX ADMIN — Medication 400 MILLIGRAM(S): at 06:21

## 2019-08-11 RX ADMIN — PANTOPRAZOLE SODIUM 40 MILLIGRAM(S): 20 TABLET, DELAYED RELEASE ORAL at 17:01

## 2019-08-11 RX ADMIN — AMLODIPINE BESYLATE 10 MILLIGRAM(S): 2.5 TABLET ORAL at 06:22

## 2019-08-11 RX ADMIN — Medication 10 MILLIGRAM(S): at 17:01

## 2019-08-11 RX ADMIN — Medication 400 MILLIGRAM(S): at 21:28

## 2019-08-11 RX ADMIN — Medication 5 MILLIGRAM(S): at 12:38

## 2019-08-11 RX ADMIN — PANTOPRAZOLE SODIUM 40 MILLIGRAM(S): 20 TABLET, DELAYED RELEASE ORAL at 06:22

## 2019-08-11 RX ADMIN — ATORVASTATIN CALCIUM 40 MILLIGRAM(S): 80 TABLET, FILM COATED ORAL at 21:28

## 2019-08-11 NOTE — PROGRESS NOTE ADULT - ATTENDING COMMENTS
DU with recent bleed  Dark stool yesterday  Continue PPI  Clears PO  Will follow up
Duodenal ulcer with resolved bleeding  Switch to PO, daily PPI after 3 days IV  Follow up as outpatient
Isabel Conn DO  [facility]  [phone]
Pt is medically stable for d/c home today as Hb remains stable on today labs. Pt rec noted : home w/ home PT. Time spent 41 min.
Pt is medically stable for d/c home today if Hb remains stable on today labs, results pending. Pt rec noted : home w/ home PT. Time spent 41 min
Isabel Conn DO  [facility]  [phone]

## 2019-08-11 NOTE — PROGRESS NOTE ADULT - PROBLEM SELECTOR PLAN 3
Pt had episode of presyncope while using the restroom. It is unclear if this may have been due to orthostatic hypotension vs micturition syncope.  Check orthostatics
Orthostatic hypotension due to GIB  WIll give additional 500cc bolus then repeat orthostatics.  Needs PT reeval when not orthostatic.

## 2019-08-11 NOTE — PROGRESS NOTE ADULT - REASON FOR ADMISSION
abdominal pain and generalized weakness

## 2019-08-11 NOTE — PROGRESS NOTE ADULT - PROBLEM SELECTOR PROBLEM 5
HTN (hypertension)
HLD (hyperlipidemia)

## 2019-08-11 NOTE — PROGRESS NOTE ADULT - PROBLEM SELECTOR PLAN 1
Continue PPI for otal 72 hrs then will switch to PO BID  Advancing diet this afternoon  F/u GI recs  Still with melena likey old blood.
Patient had recent ultrasound for RUQ pain which identified cholelithiasis without gallbladder wall thickening, surrounding fluid or Espinal sign.  Patient's CT revealed inflammatory change centered on the proximal duodenum; may reflect underlying duodenitis/ peptic ulcer disease.  Given absence of elevated liver enzmyes and no obstructing stones identified on imaging, choledocholithiasis or cholangitis seems unlikely.   Patient's persistent pain may be 2/2 duodenal ulcer vs duodenitis vs gastritis or dyspesia.   Continue maintenance fluids while NPO  EGD today per GI   Will continue protonix gtt for now. Hold further antibiotics for now.
Continue PPI for otal 72 hrs then will switch to PO BID tomorrow  H pylori and path negative  F/U GI outpt  Still with melena likely old blood but f/u CBC daily
s/p IV PPI for total 72 hrs   - start on PPI PO BID tomorrow  H pylori and path negative  F/U GI outpt   Hb remains stable today and pt is stable for d/c home.
s/p IV PPI for total 72 hrs   - start on PPI PO BID tomorrow  H pylori and path negative  F/U GI outpt  if Hb remains stable today as well, pt is stable for d/c home.

## 2019-08-11 NOTE — PROGRESS NOTE ADULT - PROBLEM SELECTOR PLAN 5
BP is currently elevated.   Resumed  home dose of amlodipine.
Start home dose of atorvastatin.

## 2019-08-11 NOTE — PROGRESS NOTE ADULT - SUBJECTIVE AND OBJECTIVE BOX
Patient is a 89y old  Female who presents with a chief complaint of abdominal pain and generalized weakness (07 Aug 2019 12:13)      INTERVAL HPI/OVERNIGHT EVENTS: Constipation x 5 days      Weakness  FH: smoking  No pertinent family history in first degree relatives  Handoff  MEWS Score  Colon cancer  Breast cancer  HLD (hyperlipidemia)  HTN (hypertension)  Pacemaker  Colon cancer  Breast cancer  HTN (hypertension)  Renal insufficiency  Atrial fibrillation  Generalized weakness  Peripheral vascular disease  Prophylactic measure  HLD (hyperlipidemia)  HTN (hypertension)  Pre-syncope  Anemia  Right upper quadrant pain  S/P cataract surgery  S/P vascular bypass  H/O mastectomy  S/P hysterectomy  S/P colon resection  S/P hysterectomy  S/P colon resection  S/P mastectomy  S/P peripheral artery bypass  BACK PAIN ABDOMINAL PAIN  RAD CDS      Review of Systems: 12 point review of systems otherwise negative  ( - )fevers/chills  ( - ) dyspnea  ( - ) cough  ( - ) chest pain  ( - ) palpatations  ( - ) dizziness/lightheadedness  ( - ) nausea/vomiting  ( - ) abd pain  ( - ) diarrhea  ( - ) melena  ( - ) hematochezia  ( - ) dysuria  ( - ) hematuria  ( - ) leg swelling  ( -) calf tenderness  ( - ) motor weakness  ( - ) extremity numbness  ( - ) back pain  ( + ) tolerating POs  ( + ) BM    MEDICATIONS  (STANDING):  amLODIPine   Tablet 10 milliGRAM(s) Oral daily  atorvastatin 40 milliGRAM(s) Oral at bedtime  pantoprazole Infusion 8 mG/Hr (10 mL/Hr) IV Continuous <Continuous>  pentoxifylline 400 milliGRAM(s) Oral three times a day  sodium chloride 0.9%. 1000 milliLiter(s) (75 mL/Hr) IV Continuous <Continuous>    MEDICATIONS  (PRN):      Allergies    No Known Allergies    Intolerances          Vital Signs Last 24 Hrs  T(C): 37 (07 Aug 2019 04:42), Max: 37 (07 Aug 2019 04:42)  T(F): 98.6 (07 Aug 2019 04:42), Max: 98.6 (07 Aug 2019 04:42)  HR: 73 (07 Aug 2019 04:42) (73 - 94)  BP: 136/67 (07 Aug 2019 04:42) (124/66 - 167/78)  BP(mean): --  RR: 18 (07 Aug 2019 04:42) (18 - 19)  SpO2: 97% (07 Aug 2019 04:42) (96% - 99%)  CAPILLARY BLOOD GLUCOSE           @ 07:01  -   @ 07:00  --------------------------------------------------------  IN: 870 mL / OUT: 0 mL / NET: 870 mL        Physical Exam:    Daily Height in cm: 157.48 (07 Aug 2019 00:52)    Daily   General:  NAD, non toxic, OOB in chair  HEENT:  Nonicteric, PERRLA, vitiligo on face  CV:  RRR, no murmur  Lungs:  CTA B/L, no wheezes, rales, rhonchi  Abdomen:  Soft, non-tender, no distended, positive BS,  Extremities:  no edema  Skin:  Warm and dry, no rashes  :  No fraire  Neuro:  AAOx3, non-focal  No Restraints    LABS:                        8.4    6.4   )-----------( 270      ( 07 Aug 2019 11:18 )             22.8     08    145  |  116<H>  |  38<H>  ----------------------------<  84  3.5   |  17<L>  |  1.19    Ca    8.3<L>      07 Aug 2019 06:03    TPro  6.1  /  Alb  3.7  /  TBili  0.2  /  DBili  x   /  AST  10  /  ALT  7<L>  /  AlkPhos  51  08-06    PT/INR - ( 05 Aug 2019 19:06 )   PT: 13.3 SEC;   INR: 1.16          PTT - ( 05 Aug 2019 19:06 )  PTT:28.2 SEC  Urinalysis Basic - ( 06 Aug 2019 19:49 )    Color: Yellow / Appearance: Clear / S.039 / pH: x  Gluc: x / Ketone: Negative  / Bili: Negative / Urobili: Negative   Blood: x / Protein: Trace / Nitrite: Negative   Leuk Esterase: Negative / RBC: 1 /hpf / WBC 1 /HPF   Sq Epi: x / Non Sq Epi: 1 /hpf / Bacteria: Negative          RADIOLOGY & ADDITIONAL TESTS:    ---------------------------------------------------------------------------  I personally reviewed: [  ]EKG   [  ]CXR    [  ] CT    [  ]Other  ---------------------------------------------------------------------------  PLEASE CHECK WHEN PRESENT:     [  ]Heart Failure     [  ] Acute     [  ] Acute on Chronic     [  ] Chronic  -------------------------------------------------------------------     [  ]Diastolic [HFpEF]     [  ]Systolic [HFrEF]     [  ]Combined [HFpEF & HFrEF]     [  ]Other:  -------------------------------------------------------------------  [  ]BATSHEVA     [  ]ATN     [  ]Reneal Medullary Necrosis     [  ]Renal Cortical Necrosis     [  ]Other Pathological Lesions:    [  ]CKD 1  [  ]CKD 2  [  ]CKD 3  [  ]CKD 4  [  ]CKD 5  [  ]Other  -------------------------------------------------------------------  [  ]Other/Unspecified:    --------------------------------------------------------------------    Abdominal Nutritional Status  [  ]Malnutrition: See Nutrition Note  [  ]Cachexia  [  ]Other:   [  ]Supplement Ordered:  [  ]Morbid Obesity (BMI >=40]
Patient is a 89y old  Female who presents with a chief complaint of abdominal pain and generalized weakness (08 Aug 2019 09:32)      INTERVAL HPI/OVERNIGHT EVENTS: tolerated procedure yesterday, had black BM this morning.       Weakness  FH: smoking  No pertinent family history in first degree relatives  Handoff  MEWS Score  Colon cancer  Breast cancer  HLD (hyperlipidemia)  HTN (hypertension)  Pacemaker  Colon cancer  Breast cancer  HTN (hypertension)  Renal insufficiency  Atrial fibrillation  Generalized weakness  Peripheral vascular disease  Prophylactic measure  HLD (hyperlipidemia)  HTN (hypertension)  Pre-syncope  Anemia  Right upper quadrant pain  S/P cataract surgery  S/P vascular bypass  H/O mastectomy  S/P hysterectomy  S/P colon resection  S/P hysterectomy  S/P colon resection  S/P mastectomy  S/P peripheral artery bypass  BACK PAIN ABDOMINAL PAIN  RAD CDS      Review of Systems: 12 point review of systems otherwise negative  ( - )fevers/chills  ( - ) dyspnea  ( - ) cough  ( - ) chest pain  ( - ) palpatations  ( - ) dizziness/lightheadedness  ( - ) nausea/vomiting  ( - ) abd pain  ( - ) diarrhea  ( - ) melena  ( - ) hematochezia  ( - ) dysuria  ( - ) hematuria  ( - ) leg swelling  ( -) calf tenderness  ( - ) motor weakness  ( - ) extremity numbness  ( - ) back pain  ( + ) tolerating POs  ( + ) BM    MEDICATIONS  (STANDING):  amLODIPine   Tablet 10 milliGRAM(s) Oral daily  atorvastatin 40 milliGRAM(s) Oral at bedtime  pantoprazole Infusion 8 mG/Hr (10 mL/Hr) IV Continuous <Continuous>  pentoxifylline 400 milliGRAM(s) Oral three times a day    MEDICATIONS  (PRN):      Allergies    No Known Allergies    Intolerances          Vital Signs Last 24 Hrs  T(C): 36.7 (08 Aug 2019 04:18), Max: 36.8 (07 Aug 2019 14:47)  T(F): 98.1 (08 Aug 2019 04:18), Max: 98.3 (08 Aug 2019 00:45)  HR: 80 (08 Aug 2019 10:40) (66 - 92)  BP: 148/68 (08 Aug 2019 10:40) (137/65 - 148/72)  BP(mean): --  RR: 18 (08 Aug 2019 04:18) (18 - 18)  SpO2: 96% (08 Aug 2019 10:40) (92% - 96%)  CAPILLARY BLOOD GLUCOSE           @ 07:01  -   @ 07:00  --------------------------------------------------------  IN: 820 mL / OUT: 0 mL / NET: 820 mL        Physical Exam:    Daily     Daily   General:  NAD, non toxic  HEENT:  Nonicteric, PERRLA  CV:  RRR, no murmur  Lungs:  CTA B/L, no wheezes, rales, rhonchi  Abdomen:  Soft, non-tender, no distended, positive BS  Extremities:  no edema  Skin:  Warm and dry, no rashes  :  No fraire  Neuro:  AAOx3, non-focal  No Restraints    LABS:                        8.5    7.4   )-----------( 303      ( 08 Aug 2019 06:04 )             25.6     08-08    145  |  112<H>  |  20  ----------------------------<  91  3.7   |  19<L>  |  1.04    Ca    8.8      08 Aug 2019 06:04    TPro  6.1  /  Alb  3.7  /  TBili  0.2  /  DBili  x   /  AST  10  /  ALT  7<L>  /  AlkPhos  51  08-06      Urinalysis Basic - ( 06 Aug 2019 19:49 )    Color: Yellow / Appearance: Clear / S.039 / pH: x  Gluc: x / Ketone: Negative  / Bili: Negative / Urobili: Negative   Blood: x / Protein: Trace / Nitrite: Negative   Leuk Esterase: Negative / RBC: 1 /hpf / WBC 1 /HPF   Sq Epi: x / Non Sq Epi: 1 /hpf / Bacteria: Negative          RADIOLOGY & ADDITIONAL TESTS:    ---------------------------------------------------------------------------  I personally reviewed: [  ]EKG   [  ]CXR    [  ] CT    [  ]Other  ---------------------------------------------------------------------------  PLEASE CHECK WHEN PRESENT:     [  ]Heart Failure     [  ] Acute     [  ] Acute on Chronic     [  ] Chronic  -------------------------------------------------------------------     [  ]Diastolic [HFpEF]     [  ]Systolic [HFrEF]     [  ]Combined [HFpEF & HFrEF]     [  ]Other:  -------------------------------------------------------------------  [  ]BATSHEVA     [  ]ATN     [  ]Reneal Medullary Necrosis     [  ]Renal Cortical Necrosis     [  ]Other Pathological Lesions:    [  ]CKD 1  [  ]CKD 2  [  ]CKD 3  [  ]CKD 4  [  ]CKD 5  [  ]Other  -------------------------------------------------------------------  [  ]Other/Unspecified:    --------------------------------------------------------------------    Abdominal Nutritional Status  [  ]Malnutrition: See Nutrition Note  [  ]Cachexia  [  ]Other:   [  ]Supplement Ordered:  [  ]Morbid Obesity (BMI >=40]
Chief Complaint:  Patient is a 89y old  Female who presents with a chief complaint of abdominal pain and generalized weakness (07 Aug 2019 12:40)      Interval Events: S/p EGD yesterday with large high risk duodenal ulcer. Reports weakness is improved today, denies abdominal pain. 1 small black BM last night, none this morning. Denies N/V.     Allergies:  No Known Allergies        Hospital Medications:  amLODIPine   Tablet 10 milliGRAM(s) Oral daily  atorvastatin 40 milliGRAM(s) Oral at bedtime  pantoprazole Infusion 8 mG/Hr IV Continuous <Continuous>  pentoxifylline 400 milliGRAM(s) Oral three times a day  sodium chloride 0.9%. 1000 milliLiter(s) IV Continuous <Continuous>      PMHX/PSHX:  Colon cancer  Breast cancer  HLD (hyperlipidemia)  HTN (hypertension)  Pacemaker  Colon cancer  Breast cancer  HTN (hypertension)  Renal insufficiency  Atrial fibrillation  S/P cataract surgery  S/P vascular bypass  H/O mastectomy  S/P hysterectomy  S/P colon resection  S/P hysterectomy  S/P colon resection  S/P mastectomy  S/P peripheral artery bypass      Family history:  FH: smoking  No pertinent family history in first degree relatives      ROS:     General: no fevers, chillls  Eyes:  Good vision, no reported pain  ENT:  No sore throat, pain, runny nose, dysphagia  CV:  No pain, palpitations, hypo/hypertension  Resp:  No dyspnea, cough, tachypnea, wheezing  GI:  see HPI  :  No pain, bleeding, incontinence, nocturia  Muscle: No pain  Neuro: no tingling, memory problems  Psych:  No insomnia, mood problems, depression  Endocrine:  No polyuria, polydipsia, cold/heat intolerance  Heme:  No petechiae, ecchymosis, easy bruisability  Skin:  No rash, edema      PHYSICAL EXAM:   Vital Signs:  Vital Signs Last 24 Hrs  T(C): 36.7 (08 Aug 2019 04:18), Max: 36.8 (07 Aug 2019 14:47)  T(F): 98.1 (08 Aug 2019 04:18), Max: 98.3 (08 Aug 2019 00:45)  HR: 92 (08 Aug 2019 04:18) (66 - 92)  BP: 147/71 (08 Aug 2019 04:18) (137/65 - 148/72)  BP(mean): --  RR: 18 (08 Aug 2019 04:18) (18 - 18)  SpO2: 95% (08 Aug 2019 04:18) (92% - 96%)  Daily     Daily     GENERAL:  Appears stated age, well-groomed, well-nourished, no distress  HEENT:  NC/AT,  conjunctivae clear and pink, no thyromegaly, nodules, adenopathy, no JVD, sclera -anicteric  CHEST:  Full & symmetric excursion, no increased effort, breath sounds clear  HEART:  Regular rhythm, S1, S2, no murmur/rub/S3/S4, no abdominal bruit, no edema  ABDOMEN:  Soft, non-tender, non-distended, normoactive bowel sounds,  no masses ,no hepato-splenomegaly, no signs of chronic liver disease, faint midline scar  EXTEREMITIES:  no cyanosis,clubbing or edema  SKIN:  No rash/erythema/ecchymoses/petechiae/wounds/abscess/warm/dry  NEURO:  Alert, oriented, no asterixis, no tremor, no encephalopathy    LABS:                        8.5    7.4   )-----------( 303      ( 08 Aug 2019 06:04 )             25.6     Mean Cell Volume: 89.9 fl (19 @ 06:04)    08-08    145  |  112<H>  |  20  ----------------------------<  91  3.7   |  19<L>  |  1.04    Ca    8.8      08 Aug 2019 06:04    TPro  6.1  /  Alb  3.7  /  TBili  0.2  /  DBili  x   /  AST  10  /  ALT  7<L>  /  AlkPhos  51  08-06    LIVER FUNCTIONS - ( 06 Aug 2019 17:39 )  Alb: 3.7 g/dL / Pro: 6.1 g/dL / ALK PHOS: 51 U/L / ALT: 7 U/L / AST: 10 U/L / GGT: x             Urinalysis Basic - ( 06 Aug 2019 19:49 )    Color: Yellow / Appearance: Clear / S.039 / pH: x  Gluc: x / Ketone: Negative  / Bili: Negative / Urobili: Negative   Blood: x / Protein: Trace / Nitrite: Negative   Leuk Esterase: Negative / RBC: 1 /hpf / WBC 1 /HPF   Sq Epi: x / Non Sq Epi: 1 /hpf / Bacteria: Negative                              8.5    7.4   )-----------( 303      ( 08 Aug 2019 06:04 )             25.6                         9.4    7.6   )-----------( 321      ( 07 Aug 2019 19:35 )             27.6                         8.4    6.4   )-----------( 270      ( 07 Aug 2019 11:18 )             22.8                         7.4    7.39  )-----------( 261      ( 07 Aug 2019 08:36 )             23.0                         9.4    10.1  )-----------( 301      ( 06 Aug 2019 17:39 )             27.9     Imaging:
Patient is a 89y old  Female who presents with a chief complaint of abdominal pain and generalized weakness (09 Aug 2019 10:10)      INTERVAL HPI/OVERNIGHT EVENTS: Feeling well, no abd pain.       Weakness  FH: smoking  No pertinent family history in first degree relatives  Handoff  MEWS Score  Colon cancer  Breast cancer  HLD (hyperlipidemia)  HTN (hypertension)  Pacemaker  Colon cancer  Breast cancer  HTN (hypertension)  Renal insufficiency  Atrial fibrillation  Generalized weakness  Acute blood loss anemia  Duodenal ulcer  Peripheral vascular disease  Prophylactic measure  HLD (hyperlipidemia)  HTN (hypertension)  Pre-syncope  Anemia  Right upper quadrant pain  S/P cataract surgery  S/P vascular bypass  H/O mastectomy  S/P hysterectomy  S/P colon resection  S/P hysterectomy  S/P colon resection  S/P mastectomy  S/P peripheral artery bypass  BACK PAIN ABDOMINAL PAIN  RAD CDS      Review of Systems: 12 point review of systems otherwise negative  ( - )fevers/chills  ( - ) dyspnea  ( - ) cough  ( - ) chest pain  ( - ) palpatations  ( - ) dizziness/lightheadedness  ( - ) nausea/vomiting  ( - ) abd pain  ( - ) diarrhea  ( - ) melena  ( - ) hematochezia  ( - ) dysuria  ( - ) hematuria  ( - ) leg swelling  ( -) calf tenderness  ( - ) motor weakness  ( - ) extremity numbness  ( - ) back pain  ( + ) tolerating POs  ( + ) BM    MEDICATIONS  (STANDING):  amLODIPine   Tablet 10 milliGRAM(s) Oral daily  atorvastatin 40 milliGRAM(s) Oral at bedtime  pantoprazole Infusion 8 mG/Hr (10 mL/Hr) IV Continuous <Continuous>  pentoxifylline 400 milliGRAM(s) Oral three times a day  potassium chloride    Tablet ER 20 milliEquivalent(s) Oral every 2 hours    MEDICATIONS  (PRN):      Allergies    No Known Allergies    Intolerances          Vital Signs Last 24 Hrs  T(C): 36.7 (09 Aug 2019 04:44), Max: 36.7 (08 Aug 2019 20:24)  T(F): 98.1 (09 Aug 2019 04:44), Max: 98.1 (08 Aug 2019 20:24)  HR: 69 (09 Aug 2019 04:44) (69 - 79)  BP: 122/71 (09 Aug 2019 04:44) (111/71 - 129/75)  BP(mean): --  RR: 18 (09 Aug 2019 04:44) (18 - 18)  SpO2: 98% (09 Aug 2019 04:44) (97% - 98%)  CAPILLARY BLOOD GLUCOSE          08-08 @ 07:01  -  08-09 @ 07:00  --------------------------------------------------------  IN: 1300 mL / OUT: 0 mL / NET: 1300 mL        Physical Exam:    Daily     Daily   General:  NAD, non toxic  HEENT:  Nonicteric, PERRLA, vitaligo on face  CV:  RRR, no murmur  Lungs:  CTA B/L, no wheezes, rales, rhonchi  Abdomen:  Soft, non-tender, no distended, positive BS  Extremities:  no edema  Skin:  Warm and dry, no rashes  :  No fraire  Neuro:  AAOx3, non-focal  No Restraints    LABS:                        8.0    6.93  )-----------( 281      ( 09 Aug 2019 08:37 )             24.3     08-09    143  |  113<H>  |  20  ----------------------------<  95  3.4<L>   |  18<L>  |  1.28    Ca    8.4      09 Aug 2019 06:10              RADIOLOGY & ADDITIONAL TESTS:    ---------------------------------------------------------------------------  I personally reviewed: [  ]EKG   [  ]CXR    [  ] CT    [  ]Other  ---------------------------------------------------------------------------  PLEASE CHECK WHEN PRESENT:     [  ]Heart Failure     [  ] Acute     [  ] Acute on Chronic     [  ] Chronic  -------------------------------------------------------------------     [  ]Diastolic [HFpEF]     [  ]Systolic [HFrEF]     [  ]Combined [HFpEF & HFrEF]     [  ]Other:  -------------------------------------------------------------------  [  ]BATSHEVA     [  ]ATN     [  ]Reneal Medullary Necrosis     [  ]Renal Cortical Necrosis     [  ]Other Pathological Lesions:    [  ]CKD 1  [  ]CKD 2  [  ]CKD 3  [  ]CKD 4  [  ]CKD 5  [  ]Other  -------------------------------------------------------------------  [  ]Other/Unspecified:    --------------------------------------------------------------------    Abdominal Nutritional Status  [  ]Malnutrition: See Nutrition Note  [  ]Cachexia  [  ]Other:   [  ]Supplement Ordered:  [  ]Morbid Obesity (BMI >=40]
Patient is a 89y old  Female who presents with a chief complaint of abdominal pain and generalized weakness (10 Aug 2019 09:19)      SUBJECTIVE / OVERNIGHT EVENTS: Pt seen and examined at bedside. She denies any CP, SOB, and abd pain. No acute events overnight.      ROS:  All other review of systems negative    Allergies    No Known Allergies    Intolerances        MEDICATIONS  (STANDING):  amLODIPine   Tablet 10 milliGRAM(s) Oral daily  atorvastatin 40 milliGRAM(s) Oral at bedtime  pantoprazole    Tablet 40 milliGRAM(s) Oral before breakfast  pentoxifylline 400 milliGRAM(s) Oral three times a day    MEDICATIONS  (PRN):      Vital Signs Last 24 Hrs  T(C): 36.7 (10 Aug 2019 13:05), Max: 36.8 (09 Aug 2019 21:00)  T(F): 98 (10 Aug 2019 13:05), Max: 98.2 (09 Aug 2019 21:00)  HR: 73 (10 Aug 2019 13:05) (62 - 83)  BP: 121/69 (10 Aug 2019 13:05) (121/69 - 152/80)  BP(mean): --  RR: 18 (10 Aug 2019 13:05) (18 - 18)  SpO2: 94% (10 Aug 2019 13:05) (93% - 98%)  CAPILLARY BLOOD GLUCOSE        I&O's Summary    09 Aug 2019 07:01  -  10 Aug 2019 07:00  --------------------------------------------------------  IN: 1160 mL / OUT: 0 mL / NET: 1160 mL    10 Aug 2019 07:01  -  10 Aug 2019 13:38  --------------------------------------------------------  IN: 480 mL / OUT: 0 mL / NET: 480 mL        PHYSICAL EXAM:  General:  NAD, non toxic  HEENT:  Nonicteric, PERRLA, vitaligo on face  CV:  RRR, no murmur  Lungs:  CTA B/L, no wheezes, rales, rhonchi  Abdomen:  Soft, non-tender, no distended, positive BS  Extremities:  no edema  Skin:  Warm and dry, no rashes  :  No fraire  Neuro:  AAOx3, non-focal    No Restraints  LABS:                        8.0    6.93  )-----------( 281      ( 09 Aug 2019 08:37 )             24.3     08-10    140  |  113<H>  |  20  ----------------------------<  84  4.4   |  15<L>  |  1.20    Ca    8.5      10 Aug 2019 07:30                RADIOLOGY & ADDITIONAL TESTS:    Care Discussed with Consultants/Other Providers: Medicine NP
Patient is a 89y old  Female who presents with a chief complaint of abdominal pain and generalized weakness (10 Aug 2019 13:36)      SUBJECTIVE / OVERNIGHT EVENTS: Pt seen and examined at bedside. she complains of constipation, states last BM was 4 days ago. she denies any CP, SOB, abd pain and n/v. No acute events overnight.     ROS:  All other review of systems negative    Allergies    No Known Allergies    Intolerances        MEDICATIONS  (STANDING):  amLODIPine   Tablet 10 milliGRAM(s) Oral daily  atorvastatin 40 milliGRAM(s) Oral at bedtime  pantoprazole    Tablet 40 milliGRAM(s) Oral two times a day  pentoxifylline 400 milliGRAM(s) Oral three times a day    MEDICATIONS  (PRN):      Vital Signs Last 24 Hrs  T(C): 36.7 (11 Aug 2019 04:42), Max: 36.7 (10 Aug 2019 13:05)  T(F): 98 (11 Aug 2019 04:42), Max: 98 (10 Aug 2019 13:05)  HR: 60 (11 Aug 2019 04:42) (60 - 73)  BP: 150/66 (11 Aug 2019 04:42) (121/69 - 150/66)  BP(mean): --  RR: 18 (11 Aug 2019 04:42) (18 - 18)  SpO2: 97% (11 Aug 2019 04:42) (94% - 97%)  CAPILLARY BLOOD GLUCOSE        I&O's Summary    10 Aug 2019 07:01  -  11 Aug 2019 07:00  --------------------------------------------------------  IN: 480 mL / OUT: 0 mL / NET: 480 mL        PHYSICAL EXAM:  General:  NAD, non toxic  HEENT:  Nonicteric, PERRLA, vitaligo on face  CV:  RRR, no murmur  Lungs:  CTA B/L, no wheezes, rales, rhonchi  Abdomen:  Soft, non-tender, no distended, positive BS  Extremities:  no edema  Skin:  Warm and dry, no rashes  :  No fraire  Neuro:  AAOx3, non-focal    LABS:                        8.6    7.80  )-----------( 295      ( 11 Aug 2019 08:49 )             26.9     08-10    140  |  113<H>  |  20  ----------------------------<  84  4.4   |  15<L>  |  1.20    Ca    8.5      10 Aug 2019 07:30                RADIOLOGY & ADDITIONAL TESTS:      Care Discussed with Consultants/Other Providers: Medicine NP
Pre-Endoscopy Evaluation      Referring Physician: dr. cuenca                                   Procedure:  upper gastrointestinal endoscopy     Indication for Procedure: anemia    Pertinent History: 89y female with history of Breast CA, Colon CA s/p resection in 1993, htn, hLd, a-fib s/p ppm - not on AC per chart 2/2 GIB, on baby aspirin who presented  with complaint of abdominal pain and generalized weakness found to have Duodenitis on CT with symptomatic anemia.      Sedation by Anesthesia [x]    PAST MEDICAL & SURGICAL HISTORY:  Colon cancer  Breast cancer  HLD (hyperlipidemia)  HTN (hypertension)  Pacemaker  Renal insufficiency  Atrial fibrillation  S/P cataract surgery  S/P hysterectomy  S/P colon resection: 1993 for colon ca.  S/P mastectomy: 1990  S/P peripheral artery bypass: in 1994      PMH of Gastroparesis [ ]  Gastric Surgery [ ]  Gastric Outlet Obstruction [ ]    Allergies:    No Known Allergies    Intolerances:    Latex allergy: [ ] yes [X] no    Medications:MEDICATIONS  (STANDING):  amLODIPine   Tablet 10 milliGRAM(s) Oral daily  atorvastatin 40 milliGRAM(s) Oral at bedtime  pantoprazole Infusion 8 mG/Hr (10 mL/Hr) IV Continuous <Continuous>  pentoxifylline 400 milliGRAM(s) Oral three times a day  sodium chloride 0.9%. 1000 milliLiter(s) (75 mL/Hr) IV Continuous <Continuous>    MEDICATIONS  (PRN):      Smoking: [ ] yes  [X] no    AICD/PPM: [X] yes   [ ] no    Pertinent lab data:                        8.4    6.4   )-----------( 270      ( 07 Aug 2019 11:18 )             22.8     08-07    145  |  116<H>  |  38<H>  ----------------------------<  84  3.5   |  17<L>  |  1.19    Ca    8.3<L>      07 Aug 2019 06:03    TPro  6.1  /  Alb  3.7  /  TBili  0.2  /  DBili  x   /  AST  10  /  ALT  7<L>  /  AlkPhos  51  08-06    PT/INR - ( 05 Aug 2019 19:06 )   PT: 13.3 SEC;   INR: 1.16          PTT - ( 05 Aug 2019 19:06 )  PTT:28.2 SEC      < from: Transthoracic Echocardiogram (07.09.13 @ 08:00) >    Fractional short: 38 %  Ejection Fraction: 68 %  ------------------------------------------------------------------------  OBSERVATIONS:  Mitral Valve: Mildly thickened mitral valve.  Moderate mitral regurgitation.  Aortic Root: Normal aortic root.  Aortic Valve: Calcified trileaflet aortic valve with normal  opening.  Left Atrium: Mildly dilated left atrium.  LA volume index =  31 cc/m2.  Left Ventricle: Normal left ventricular internal dimensions  and wall thicknesses.  Normal left ventricular systolic function. No segmental  wall motion abnormalities.  Right Heart: Normal right atrium.  Normal right ventricular size and function.  Normal tricuspid valve. Mild-moderate tricuspid  regurgitation.  Normal pulmonic valve. Mild-moderate pulmonic  regurgitation.  Estimated pulmonary artery systolic pressure equals 51 mm  Hg, assuming right atrial pressure equals 10  mm Hg,  consistent with moderate pulmonary hypertension.  Pericardium/PleuraNormal pericardium with no pericardial  effusion.  ------------------------------------------------------------------------  CONCLUSIONS:  1. Normal left ventricular internal dimensions and wall  thicknesses.  2. Normal left ventricular systolic function. No segmental  wall motion abnormalities.  --------------------------------------------      Physical Examination:  Daily Height in cm: 157.48 (07 Aug 2019 00:52)    Daily   Vital Signs Last 24 Hrs  T(C): 37 (07 Aug 2019 04:42), Max: 37 (07 Aug 2019 04:42)  T(F): 98.6 (07 Aug 2019 04:42), Max: 98.6 (07 Aug 2019 04:42)  HR: 73 (07 Aug 2019 04:42) (73 - 94)  BP: 136/67 (07 Aug 2019 04:42) (124/66 - 167/78)  BP(mean): --  RR: 18 (07 Aug 2019 04:42) (18 - 19)  SpO2: 97% (07 Aug 2019 04:42) (96% - 99%)        Constitutional: NAD     Neck:  No JVD    Respiratory: CTAB/L    Cardiovascular: S1 and S2    Gastrointestinal: BS+, soft, NT/ND    Extremities: No peripheral edema    Neurological: A/O x 3    : No Jarrett    Skin: No rashes    Comments:    The patient is a suitable candidate for the planned procedure unless box checked [ ]  No, explain:
Chief Complaint:  Patient is a 89y old  Female who presents with a chief complaint of abdominal pain and generalized weakness (08 Aug 2019 11:56)      Interval Events: Patient reports 1BM yesterday, described as small black pellets. Denies any abdominal pain, N/V. Tolerating regular diet. +dizziness with standing - +orthostatics. H/H stable.    Allergies:  No Known Allergies        Hospital Medications:  amLODIPine   Tablet 10 milliGRAM(s) Oral daily  atorvastatin 40 milliGRAM(s) Oral at bedtime  pantoprazole Infusion 8 mG/Hr IV Continuous <Continuous>  pentoxifylline 400 milliGRAM(s) Oral three times a day      PMHX/PSHX:  Colon cancer  Breast cancer  HLD (hyperlipidemia)  HTN (hypertension)  Pacemaker  Colon cancer  Breast cancer  HTN (hypertension)  Renal insufficiency  Atrial fibrillation  S/P cataract surgery  S/P vascular bypass  H/O mastectomy  S/P hysterectomy  S/P colon resection  S/P hysterectomy  S/P colon resection  S/P mastectomy  S/P peripheral artery bypass      Family history:  FH: smoking  No pertinent family history in first degree relatives      ROS:   General: no fevers, chills  Eyes:  Good vision, no reported pain  ENT:  No sore throat, pain, runny nose, dysphagia  CV:  No pain, palpitations, hypo/hypertension  Resp:  No dyspnea, cough, tachypnea, wheezing  GI:  see HPI  :  No pain, bleeding, incontinence, nocturia  Muscle: No pain  Neuro: no tingling, memory problems  Psych:  No insomnia, mood problems, depression  Endocrine:  No polyuria, polydipsia, cold/heat intolerance  Heme:  No petechiae, ecchymosis, easy bruisability  Skin:  No rash, edema      PHYSICAL EXAM:   Vital Signs:  Vital Signs Last 24 Hrs  T(C): 36.7 (09 Aug 2019 04:44), Max: 36.7 (08 Aug 2019 20:24)  T(F): 98.1 (09 Aug 2019 04:44), Max: 98.1 (08 Aug 2019 20:24)  HR: 69 (09 Aug 2019 04:44) (69 - 80)  BP: 122/71 (09 Aug 2019 04:44) (111/71 - 148/68)  BP(mean): --  RR: 18 (09 Aug 2019 04:44) (18 - 18)  SpO2: 98% (09 Aug 2019 04:44) (96% - 98%)  Daily     Daily     GENERAL:  Appears stated age, well-groomed, well-nourished, no distress  HEENT:  NC/AT,  conjunctivae clear and pink, no thyromegaly, nodules, adenopathy, no JVD, sclera -anicteric  CHEST:  Full & symmetric excursion, no increased effort, breath sounds clear  HEART:  Regular rhythm, S1, S2, no murmur/rub/S3/S4, no abdominal bruit, no edema  ABDOMEN:  Soft, non-tender, non-distended, normoactive bowel sounds,  no masses ,no hepato-splenomegaly, no signs of chronic liver disease, faint midline scar  EXTEREMITIES:  no cyanosis,clubbing or edema  SKIN:  No rash/erythema/ecchymoses/petechiae/wounds/abscess/warm/dry  NEURO:  Alert, oriented, no asterixis, no tremor, no encephalopathy    LABS:                        8.0    6.93  )-----------( 281      ( 09 Aug 2019 08:37 )             24.3     Mean Cell Volume: 91.7 fl (08-09-19 @ 08:37)    08-09    143  |  113<H>  |  20  ----------------------------<  95  3.4<L>   |  18<L>  |  1.28    Ca    8.4      09 Aug 2019 06:10                                    8.0    6.93  )-----------( 281      ( 09 Aug 2019 08:37 )             24.3                         8.5    7.4   )-----------( 303      ( 08 Aug 2019 06:04 )             25.6                         9.4    7.6   )-----------( 321      ( 07 Aug 2019 19:35 )             27.6                         8.4    6.4   )-----------( 270      ( 07 Aug 2019 11:18 )             22.8                         7.4    7.39  )-----------( 261      ( 07 Aug 2019 08:36 )             23.0     Imaging:

## 2019-08-11 NOTE — PROGRESS NOTE ADULT - PROBLEM SELECTOR PROBLEM 2
Acute blood loss anemia
Anemia
Acute blood loss anemia

## 2019-08-11 NOTE — PROGRESS NOTE ADULT - PROBLEM SELECTOR PROBLEM 7
Peripheral vascular disease
Prophylactic measure

## 2019-08-11 NOTE — PROGRESS NOTE ADULT - ASSESSMENT
This patient is a 89yoF with PMH of breast CA, colon CA, htn, hLd, ?afib s/p ppm, who presents to the ED with complaint of abdominal pain and generalized weakness, found on recent CT to have abnormal duodenal findings suggestive of duodenitis vs ulcer, and downtrending Hgb concerning for occult GIB.
This patient is a 89yoF with PMH of breast CA, colon CA, htn, hLd, ?afib s/p ppm, who presents to the ED with complaint of abdominal pain with melena found to have actively bleeding duodenal ulcer.
89F with history of breast CA, colon CA s/p resection in 1993, htn, hLd, afib s/p ppm - not on AC per chart 2/2 GIB, on baby aspirin who presented to the ED with complaint of abdominal pain and generalized weakness found to have large duodenal ulcer on EGD.    Impression:  - duodenal ulcer  - anemia    Recommendation:  - c/w PPI gtt x72 hours from EGD, then transition to PO BID  - maintain 2 peripheral IVs, active type and screen  - H/H slightly lower from yesterday however overall stable  - can monitor CBC daily unless increased black/bloody BM  - c/w clear diet, can advance as tolerated later today
This patient is a 89yoF with PMH of breast CA, colon CA, htn, hLd, ?afib s/p ppm, who presents to the ED with complaint of abdominal pain with melena found to have actively bleeding duodenal ulcer.
89F with history of breast CA, colon CA s/p resection in 1993, htn, hLd, afib s/p ppm - not on AC per chart 2/2 GIB, on baby aspirin who presented to the ED with complaint of abdominal pain and generalized weakness found to have large duodenal ulcer on EGD.    Impression:  - duodenal ulcer - Hpylori negative  - anemia    Recommendation:  - c/w PPI gtt x72 hours, then transition to PO BID  - H/H overall stable  - c/w CBC daily  - c/w diet as tolerated  - will need f/u outpatient

## 2019-08-11 NOTE — PROGRESS NOTE ADULT - PROBLEM SELECTOR PLAN 7
Patient is on trental for history of peripheral vascular disease s/p stent of lower extremity. Will continue trental for now  holding baby aspirin now.
VTE ppx: venodyne boots for now  Activity: ambulate with assistance  Diet: NPO for now
VTE ppx: venodyne boots for now  Activity: ambulate with assistance  Diet: Dash
VTE ppx: venodyne boots for now  Activity: ambulate with assistance  Diet: Dash
VTE ppx: venodyne boots for now  Activity: ambulate with assistance  Diet: Dash    Constipation: c/w currently BM, given dulcolax suppository x1 now

## 2019-08-11 NOTE — PROGRESS NOTE ADULT - PROVIDER SPECIALTY LIST ADULT
Gastroenterology
Gastroenterology
Hospitalist
Gastroenterology
Hospitalist
Hospitalist

## 2019-08-11 NOTE — PROGRESS NOTE ADULT - PROBLEM SELECTOR PROBLEM 6
HLD (hyperlipidemia)
Peripheral vascular disease

## 2019-08-11 NOTE — PROGRESS NOTE ADULT - PROBLEM SELECTOR PLAN 4
Pt had episode of presyncope while using the restroom. It is unclear if this may have been due to orthostatic hypotension vs micturition syncope.  resolved.
BP is currently elevated.   Hold  home dose of amlodipine.
BP is currently elevated.   Resumed  home dose of amlodipine.

## 2019-08-11 NOTE — PROGRESS NOTE ADULT - PROBLEM SELECTOR PLAN 2
Patient was noted to have slowly downtrending hemoglobin in labs since May 2019 due to duodenal ulcer.  Will likely need iron supplementation on DC.
Patient was noted to have slowly downtrending hemoglobin in labs since May 2019.  Will obtain iron levels, ferritin.   Trend CBC
If hg stable can likely DC today  Patient was noted to have slowly downtrending hemoglobin in labs since May 2019 due to duodenal ulcer.  Iron sulfate 325mg PO daily
If hg stable can likely DC tomorrow.   Patient was noted to have slowly downtrending hemoglobin in labs since May 2019 due to duodenal ulcer.  Iron sulfate 325mg PO daily
hg stable , DC home today  Patient was noted to have slowly downtrending hemoglobin in labs since May 2019 due to duodenal ulcer.  Iron sulfate 325mg PO daily

## 2019-08-11 NOTE — PROGRESS NOTE ADULT - PROBLEM SELECTOR PLAN 6
Start home dose of atorvastatin.
Patient is on trental for history of peripheral vascular disease s/p stent of lower extremity. Will continue trental for now  holding baby aspirin now.
Patient is on trental for history of peripheral vascular disease s/p stent of lower extremity. Will continue trental for now  holding baby aspirin now- restart as per GI recs

## 2019-08-12 VITALS
SYSTOLIC BLOOD PRESSURE: 124 MMHG | OXYGEN SATURATION: 99 % | RESPIRATION RATE: 18 BRPM | HEART RATE: 75 BPM | TEMPERATURE: 98 F | DIASTOLIC BLOOD PRESSURE: 66 MMHG

## 2019-08-12 LAB
HCT VFR BLD CALC: 26.5 % — LOW (ref 34.5–45)
HGB BLD-MCNC: 9.1 G/DL — LOW (ref 11.5–15.5)
MCHC RBC-ENTMCNC: 31 PG — SIGNIFICANT CHANGE UP (ref 27–34)
MCHC RBC-ENTMCNC: 34.4 GM/DL — SIGNIFICANT CHANGE UP (ref 32–36)
MCV RBC AUTO: 90 FL — SIGNIFICANT CHANGE UP (ref 80–100)
PLATELET # BLD AUTO: 341 K/UL — SIGNIFICANT CHANGE UP (ref 150–400)
RBC # BLD: 2.95 M/UL — LOW (ref 3.8–5.2)
RBC # FLD: 13.1 % — SIGNIFICANT CHANGE UP (ref 10.3–14.5)
WBC # BLD: 8.4 K/UL — SIGNIFICANT CHANGE UP (ref 3.8–10.5)
WBC # FLD AUTO: 8.4 K/UL — SIGNIFICANT CHANGE UP (ref 3.8–10.5)

## 2019-08-12 PROCEDURE — 71045 X-RAY EXAM CHEST 1 VIEW: CPT

## 2019-08-12 PROCEDURE — 88312 SPECIAL STAINS GROUP 1: CPT

## 2019-08-12 PROCEDURE — 85027 COMPLETE CBC AUTOMATED: CPT

## 2019-08-12 PROCEDURE — 86900 BLOOD TYPING SEROLOGIC ABO: CPT

## 2019-08-12 PROCEDURE — 81001 URINALYSIS AUTO W/SCOPE: CPT

## 2019-08-12 PROCEDURE — 70450 CT HEAD/BRAIN W/O DYE: CPT

## 2019-08-12 PROCEDURE — 93005 ELECTROCARDIOGRAM TRACING: CPT

## 2019-08-12 PROCEDURE — 97116 GAIT TRAINING THERAPY: CPT

## 2019-08-12 PROCEDURE — 83550 IRON BINDING TEST: CPT

## 2019-08-12 PROCEDURE — 99285 EMERGENCY DEPT VISIT HI MDM: CPT

## 2019-08-12 PROCEDURE — 86850 RBC ANTIBODY SCREEN: CPT

## 2019-08-12 PROCEDURE — 87086 URINE CULTURE/COLONY COUNT: CPT

## 2019-08-12 PROCEDURE — 97530 THERAPEUTIC ACTIVITIES: CPT

## 2019-08-12 PROCEDURE — 97162 PT EVAL MOD COMPLEX 30 MIN: CPT

## 2019-08-12 PROCEDURE — 82728 ASSAY OF FERRITIN: CPT

## 2019-08-12 PROCEDURE — 80048 BASIC METABOLIC PNL TOTAL CA: CPT

## 2019-08-12 PROCEDURE — 86901 BLOOD TYPING SEROLOGIC RH(D): CPT

## 2019-08-12 PROCEDURE — 99239 HOSP IP/OBS DSCHRG MGMT >30: CPT

## 2019-08-12 PROCEDURE — 88305 TISSUE EXAM BY PATHOLOGIST: CPT

## 2019-08-12 PROCEDURE — 83540 ASSAY OF IRON: CPT

## 2019-08-12 PROCEDURE — 83690 ASSAY OF LIPASE: CPT

## 2019-08-12 PROCEDURE — 80053 COMPREHEN METABOLIC PANEL: CPT

## 2019-08-12 RX ORDER — PANTOPRAZOLE SODIUM 20 MG/1
1 TABLET, DELAYED RELEASE ORAL
Qty: 60 | Refills: 0
Start: 2019-08-12 | End: 2019-09-10

## 2019-08-12 RX ORDER — ASPIRIN/CALCIUM CARB/MAGNESIUM 324 MG
1 TABLET ORAL
Qty: 0 | Refills: 0 | DISCHARGE

## 2019-08-12 RX ADMIN — PANTOPRAZOLE SODIUM 40 MILLIGRAM(S): 20 TABLET, DELAYED RELEASE ORAL at 06:15

## 2019-08-12 RX ADMIN — AMLODIPINE BESYLATE 10 MILLIGRAM(S): 2.5 TABLET ORAL at 06:15

## 2019-08-12 RX ADMIN — Medication 400 MILLIGRAM(S): at 06:15

## 2019-08-12 NOTE — CHART NOTE - NSCHARTNOTEFT_GEN_A_CORE
Patient seen and examined this morning. VSS. She had small BM since yesterday. No ab pain and tolerating diet. Patient wants to go home.     This patient is a 89 y/o F with PMH of breast CA, colon CA, HTN, HLD, ?afib s/p ppm, PVD, who presents to the ED with complaint of abdominal pain with melena found to have a large duodenal ulcer as well as esophageal ulcer on EGD. Path negative for H. pylori. H/H stable. Plan to continue PPI as outpatient.     Patient's PMD(Dr. Cool) informed by Dr. Conn.    D/C home today. Time 35 mins.

## 2019-08-22 PROBLEM — I10 ESSENTIAL (PRIMARY) HYPERTENSION: Chronic | Status: ACTIVE | Noted: 2019-08-06

## 2019-08-22 PROBLEM — C50.919 MALIGNANT NEOPLASM OF UNSPECIFIED SITE OF UNSPECIFIED FEMALE BREAST: Chronic | Status: ACTIVE | Noted: 2019-08-07

## 2019-08-22 PROBLEM — C18.9 MALIGNANT NEOPLASM OF COLON, UNSPECIFIED: Chronic | Status: ACTIVE | Noted: 2019-08-07

## 2019-08-22 PROBLEM — E78.5 HYPERLIPIDEMIA, UNSPECIFIED: Chronic | Status: ACTIVE | Noted: 2019-08-06

## 2019-09-09 ENCOUNTER — APPOINTMENT (OUTPATIENT)
Dept: GASTROENTEROLOGY | Facility: CLINIC | Age: 84
End: 2019-09-09
Payer: MEDICARE

## 2019-09-09 VITALS
BODY MASS INDEX: 21.51 KG/M2 | OXYGEN SATURATION: 91 % | RESPIRATION RATE: 17 BRPM | HEART RATE: 79 BPM | WEIGHT: 126 LBS | TEMPERATURE: 97.3 F | SYSTOLIC BLOOD PRESSURE: 122 MMHG | HEIGHT: 64 IN | DIASTOLIC BLOOD PRESSURE: 80 MMHG

## 2019-09-09 DIAGNOSIS — K26.4 CHRONIC OR UNSPECIFIED DUODENAL ULCER WITH HEMORRHAGE: ICD-10-CM

## 2019-09-09 DIAGNOSIS — Z82.3 FAMILY HISTORY OF STROKE: ICD-10-CM

## 2019-09-09 DIAGNOSIS — Z95.0 PRESENCE OF CARDIAC PACEMAKER: ICD-10-CM

## 2019-09-09 DIAGNOSIS — I10 ESSENTIAL (PRIMARY) HYPERTENSION: ICD-10-CM

## 2019-09-09 DIAGNOSIS — Z78.9 OTHER SPECIFIED HEALTH STATUS: ICD-10-CM

## 2019-09-09 DIAGNOSIS — Z85.038 PERSONAL HISTORY OF OTHER MALIGNANT NEOPLASM OF LARGE INTESTINE: ICD-10-CM

## 2019-09-09 PROCEDURE — 99214 OFFICE O/P EST MOD 30 MIN: CPT

## 2019-09-09 RX ORDER — PANTOPRAZOLE 40 MG/1
40 TABLET, DELAYED RELEASE ORAL DAILY
Qty: 1 | Refills: 3 | Status: ACTIVE | COMMUNITY
Start: 1900-01-01 | End: 1900-01-01

## 2019-09-09 NOTE — HISTORY OF PRESENT ILLNESS
[de-identified] : 89 year old woman hospitalized at [facility] for UGI bleed 8/6 - 8/12 that presented with weakness and melena..  . EGD showed a large DU.  She die well with no further bleeding. She is s/p colon resection for colon cancer in 1992. She denies rectal bleeding or hematemesis. She presents for hospital follow-up.

## 2019-09-09 NOTE — PHYSICAL EXAM
[General Appearance - In No Acute Distress] : in no acute distress [General Appearance - Alert] : alert [PERRL With Normal Accommodation] : pupils were equal in size, round, and reactive to light [Sclera] : the sclera and conjunctiva were normal [Outer Ear] : the ears and nose were normal in appearance [Extraocular Movements] : extraocular movements were intact [Oropharynx] : the oropharynx was normal [Auscultation Breath Sounds / Voice Sounds] : lungs were clear to auscultation bilaterally [Heart Rate And Rhythm] : heart rate was normal and rhythm regular [Heart Sounds] : normal S1 and S2 [Heart Sounds Gallop] : no gallops [Murmurs] : no murmurs [Heart Sounds Pericardial Friction Rub] : no pericardial rub [Bowel Sounds] : normal bowel sounds [Abdomen Soft] : soft [Abdomen Tenderness] : non-tender [Abdomen Mass (___ Cm)] : no abdominal mass palpated [] : no hepato-splenomegaly [Cervical Lymph Nodes Enlarged Posterior Bilaterally] : posterior cervical [Cervical Lymph Nodes Enlarged Anterior Bilaterally] : anterior cervical [Supraclavicular Lymph Nodes Enlarged Bilaterally] : supraclavicular [No CVA Tenderness] : no ~M costovertebral angle tenderness [No Spinal Tenderness] : no spinal tenderness

## 2019-09-13 LAB — HEMOCCULT STL QL IA: NEGATIVE

## 2019-10-08 ENCOUNTER — APPOINTMENT (OUTPATIENT)
Dept: ELECTROPHYSIOLOGY | Facility: CLINIC | Age: 84
End: 2019-10-08
Payer: MEDICARE

## 2019-10-08 PROCEDURE — 93294 REM INTERROG EVL PM/LDLS PM: CPT

## 2019-10-08 PROCEDURE — 93296 REM INTERROG EVL PM/IDS: CPT

## 2019-12-09 ENCOUNTER — APPOINTMENT (OUTPATIENT)
Dept: GASTROENTEROLOGY | Facility: CLINIC | Age: 84
End: 2019-12-09

## 2020-01-13 ENCOUNTER — APPOINTMENT (OUTPATIENT)
Dept: ELECTROPHYSIOLOGY | Facility: CLINIC | Age: 85
End: 2020-01-13
Payer: MEDICARE

## 2020-01-13 PROCEDURE — 93294 REM INTERROG EVL PM/LDLS PM: CPT

## 2020-01-13 PROCEDURE — 93296 REM INTERROG EVL PM/IDS: CPT

## 2020-02-29 NOTE — PATIENT PROFILE ADULT - HOW PATIENT ADDRESSED, PROFILE
Mrs Garcia Problem: Respiratory Impairment - Respiratory Therapy 253  Intervention: Respiratory support devices  Intervention Status  Done

## 2020-04-14 ENCOUNTER — APPOINTMENT (OUTPATIENT)
Dept: ELECTROPHYSIOLOGY | Facility: CLINIC | Age: 85
End: 2020-04-14
Payer: MEDICARE

## 2020-04-14 PROCEDURE — 93296 REM INTERROG EVL PM/IDS: CPT

## 2020-04-14 PROCEDURE — 93294 REM INTERROG EVL PM/LDLS PM: CPT

## 2020-06-29 ENCOUNTER — APPOINTMENT (OUTPATIENT)
Dept: ELECTROPHYSIOLOGY | Facility: CLINIC | Age: 85
End: 2020-06-29
Payer: MEDICARE

## 2020-06-29 PROCEDURE — 93279 PRGRMG DEV EVAL PM/LDLS PM: CPT

## 2020-09-08 NOTE — ED PROVIDER NOTE - NS ED ATTENDING STATEMENT MOD
RX faxed to pharmacy per 's protocol       I have personally performed a face to face diagnostic evaluation on this patient. I have reviewed the ACP note and agree with the history, exam and plan of care, except as noted.

## 2020-09-08 NOTE — ED ADULT NURSE NOTE - ED STAT RN HANDOFF DETAILS
Patient brought in chip on 9/4/2020. Chip was downloaded and download was placed in Dr. Worthington in basket.   report given to CAMILA Genao

## 2020-09-30 ENCOUNTER — APPOINTMENT (OUTPATIENT)
Dept: ELECTROPHYSIOLOGY | Facility: CLINIC | Age: 85
End: 2020-09-30
Payer: MEDICARE

## 2020-09-30 PROCEDURE — 93296 REM INTERROG EVL PM/IDS: CPT

## 2020-09-30 PROCEDURE — 93294 REM INTERROG EVL PM/LDLS PM: CPT

## 2020-12-31 ENCOUNTER — APPOINTMENT (OUTPATIENT)
Dept: ELECTROPHYSIOLOGY | Facility: CLINIC | Age: 85
End: 2020-12-31
Payer: MEDICARE

## 2020-12-31 PROCEDURE — 93296 REM INTERROG EVL PM/IDS: CPT

## 2020-12-31 PROCEDURE — 93294 REM INTERROG EVL PM/LDLS PM: CPT

## 2021-04-01 ENCOUNTER — APPOINTMENT (OUTPATIENT)
Dept: ELECTROPHYSIOLOGY | Facility: CLINIC | Age: 86
End: 2021-04-01
Payer: MEDICARE

## 2021-04-01 ENCOUNTER — NON-APPOINTMENT (OUTPATIENT)
Age: 86
End: 2021-04-01

## 2021-04-01 PROCEDURE — 93296 REM INTERROG EVL PM/IDS: CPT

## 2021-04-01 PROCEDURE — 93294 REM INTERROG EVL PM/LDLS PM: CPT

## 2021-05-12 NOTE — PROGRESS NOTE ADULT - PROBLEM/PLAN-7
----- Message from Xavier Wick MD sent at 5/12/2021  2:23 PM CDT -----  Please call pt   Chest x-ray shows COPD with no active disease    Consider CT of chest low dose for lung cancer screening in future   
DISPLAY PLAN FREE TEXT

## 2021-06-28 ENCOUNTER — APPOINTMENT (OUTPATIENT)
Dept: ELECTROPHYSIOLOGY | Facility: CLINIC | Age: 86
End: 2021-06-28
Payer: MEDICARE

## 2021-06-28 VITALS — SYSTOLIC BLOOD PRESSURE: 144 MMHG | DIASTOLIC BLOOD PRESSURE: 77 MMHG

## 2021-06-28 DIAGNOSIS — I48.91 UNSPECIFIED ATRIAL FIBRILLATION: ICD-10-CM

## 2021-06-28 PROCEDURE — 93280 PM DEVICE PROGR EVAL DUAL: CPT

## 2021-06-28 PROCEDURE — 99072 ADDL SUPL MATRL&STAF TM PHE: CPT

## 2021-07-08 ENCOUNTER — APPOINTMENT (OUTPATIENT)
Dept: ELECTROPHYSIOLOGY | Facility: CLINIC | Age: 86
End: 2021-07-08

## 2021-09-28 ENCOUNTER — APPOINTMENT (OUTPATIENT)
Dept: ELECTROPHYSIOLOGY | Facility: CLINIC | Age: 86
End: 2021-09-28
Payer: MEDICARE

## 2021-09-28 ENCOUNTER — NON-APPOINTMENT (OUTPATIENT)
Age: 86
End: 2021-09-28

## 2021-09-28 PROCEDURE — 93296 REM INTERROG EVL PM/IDS: CPT

## 2021-09-28 PROCEDURE — 93294 REM INTERROG EVL PM/LDLS PM: CPT

## 2021-12-23 NOTE — DISCHARGE NOTE ADULT - NS AS DC FU INST LIST INST
Patient swabbed for COVID PCR, label verified with patient and witnessed cap tight and flat.  Patient aware test results on kate 1-2 days from tomorrow.  Tube walked to lab fridge.    Electronically signed by Kandi Raymond RN.    
no

## 2021-12-28 ENCOUNTER — APPOINTMENT (OUTPATIENT)
Dept: ELECTROPHYSIOLOGY | Facility: CLINIC | Age: 86
End: 2021-12-28
Payer: MEDICARE

## 2021-12-28 ENCOUNTER — NON-APPOINTMENT (OUTPATIENT)
Age: 86
End: 2021-12-28

## 2021-12-28 PROCEDURE — 93294 REM INTERROG EVL PM/LDLS PM: CPT | Mod: NC

## 2021-12-28 PROCEDURE — 93296 REM INTERROG EVL PM/IDS: CPT | Mod: NC

## 2022-03-29 ENCOUNTER — APPOINTMENT (OUTPATIENT)
Dept: ELECTROPHYSIOLOGY | Facility: CLINIC | Age: 87
End: 2022-03-29

## 2022-03-31 ENCOUNTER — FORM ENCOUNTER (OUTPATIENT)
Age: 87
End: 2022-03-31

## 2022-04-29 ENCOUNTER — APPOINTMENT (OUTPATIENT)
Dept: ELECTROPHYSIOLOGY | Facility: CLINIC | Age: 87
End: 2022-04-29

## 2022-05-11 ENCOUNTER — APPOINTMENT (OUTPATIENT)
Dept: ELECTROPHYSIOLOGY | Facility: CLINIC | Age: 87
End: 2022-05-11
Payer: MEDICARE

## 2022-05-11 ENCOUNTER — NON-APPOINTMENT (OUTPATIENT)
Age: 87
End: 2022-05-11

## 2022-05-11 PROCEDURE — 93296 REM INTERROG EVL PM/IDS: CPT

## 2022-05-11 PROCEDURE — 93294 REM INTERROG EVL PM/LDLS PM: CPT

## 2022-06-08 ENCOUNTER — APPOINTMENT (OUTPATIENT)
Dept: ELECTROPHYSIOLOGY | Facility: CLINIC | Age: 87
End: 2022-06-08

## 2022-06-08 VITALS
WEIGHT: 142 LBS | HEART RATE: 54 BPM | DIASTOLIC BLOOD PRESSURE: 69 MMHG | BODY MASS INDEX: 24.37 KG/M2 | OXYGEN SATURATION: 95 % | TEMPERATURE: 97.8 F | RESPIRATION RATE: 17 BRPM | SYSTOLIC BLOOD PRESSURE: 178 MMHG

## 2022-06-08 PROCEDURE — 93000 ELECTROCARDIOGRAM COMPLETE: CPT

## 2022-06-08 PROCEDURE — 99213 OFFICE O/P EST LOW 20 MIN: CPT | Mod: 25

## 2022-06-08 RX ORDER — APIXABAN 2.5 MG/1
2.5 TABLET, FILM COATED ORAL
Refills: 0 | Status: ACTIVE | COMMUNITY
Start: 2022-06-08

## 2022-06-08 NOTE — DISCUSSION/SUMMARY
[FreeTextEntry1] : In summary, Ms. Garcia is a 89 year old female with PMH of hypertension, hyperlipidemia, longstanding persistent atrial fibrillation (on Eliquis), NSVT, syncope due to pauses, s/p single chamber Medtronic pacemaker implant 7/12/2013, renal insufficiency, PAD s/p peripheral artery bypass, breast cancer s/p left mastectomy, and colon cancer s/p resection who presents for routine pacemaker interrogation.\par \par 1. Afib w/ syncope due to pauses s/p single chamber PPM placement. ECG performed today to assess for appropriate pacing and reveals Afib with intermittent V pacing. Device checked today in office and reveals normal device function, battery longevity of 17 months (range <2 -32 months), underlying rhythm: Afib, not pacemaker dependent, V pace 23.6%, store data reveals no events for review since last remote transmission from 5/11/22. Follow up device check remotely every 3 months and clinic in 1 year. Continue Eliquis for stroke prevention. \par \par 2. HTN: Continue anti-HTN medications and low salt diet \par \par \par Sincerely,\par \par Mario Rockwell M.D.

## 2022-06-08 NOTE — HISTORY OF PRESENT ILLNESS
[FreeTextEntry1] : Ms. Garcia is a 89 year old female with PMH of hypertension, hyperlipidemia, longstanding persistent atrial fibrillation (on eliquis), NSVT, syncope due to pauses, s/p single chamber Medtronic pacemaker implant 7/12/2013, renal insufficiency, PAD s/p peripheral artery bypass, breast cancer s/p left mastectomy, and colon cancer s/p resection who presents for routine pacemaker interrogation. She denies chest pain, shortness of breath, palpitations, lightheadedness, or syncope.

## 2022-06-08 NOTE — CARDIOLOGY SUMMARY
[de-identified] : Device model: Medtronic Adapta [id number], implant date: 7/12/2013\par Battery longevity: 17 months (range <2-32 months)\par Pacing mode: VVI 50 bpm\par Underlying rhythm: Afib with VHR 50-60's\par Ventricular lead: Sense: 2.0-2.8 mV, Lead Impedance: 330 ohms, Threshold: 1.25V @ 0.4ms\par Stored data revealed no events for review since last remote transmission on 5/11/2022

## 2022-06-28 ENCOUNTER — APPOINTMENT (OUTPATIENT)
Dept: ELECTROPHYSIOLOGY | Facility: CLINIC | Age: 87
End: 2022-06-28

## 2022-08-11 ENCOUNTER — APPOINTMENT (OUTPATIENT)
Dept: ELECTROPHYSIOLOGY | Facility: CLINIC | Age: 87
End: 2022-08-11

## 2022-08-16 ENCOUNTER — NON-APPOINTMENT (OUTPATIENT)
Age: 87
End: 2022-08-16

## 2022-08-16 ENCOUNTER — APPOINTMENT (OUTPATIENT)
Dept: ELECTROPHYSIOLOGY | Facility: CLINIC | Age: 87
End: 2022-08-16

## 2022-08-16 PROCEDURE — 93294 REM INTERROG EVL PM/LDLS PM: CPT

## 2022-08-16 PROCEDURE — 93296 REM INTERROG EVL PM/IDS: CPT

## 2022-11-15 ENCOUNTER — NON-APPOINTMENT (OUTPATIENT)
Age: 87
End: 2022-11-15

## 2022-11-15 ENCOUNTER — APPOINTMENT (OUTPATIENT)
Dept: ELECTROPHYSIOLOGY | Facility: CLINIC | Age: 87
End: 2022-11-15

## 2022-11-15 PROCEDURE — 93296 REM INTERROG EVL PM/IDS: CPT

## 2022-11-15 PROCEDURE — 93294 REM INTERROG EVL PM/LDLS PM: CPT

## 2023-02-14 ENCOUNTER — APPOINTMENT (OUTPATIENT)
Dept: ELECTROPHYSIOLOGY | Facility: CLINIC | Age: 88
End: 2023-02-14
Payer: MEDICARE

## 2023-02-14 ENCOUNTER — NON-APPOINTMENT (OUTPATIENT)
Age: 88
End: 2023-02-14

## 2023-02-14 PROCEDURE — 93294 REM INTERROG EVL PM/LDLS PM: CPT

## 2023-02-14 PROCEDURE — 93296 REM INTERROG EVL PM/IDS: CPT

## 2023-04-27 NOTE — H&P ADULT - RS GEN HX ROS MEA POS PC
pleuritic chest pain Where Do You Want The Question To Include Opioid Counseling Located?: Case Summary Tab

## 2023-04-27 NOTE — PHYSICAL THERAPY INITIAL EVALUATION ADULT - PERTINENT HX OF CURRENT PROBLEM, REHAB EVAL
- continue with clotrimazole and desitin PRN   Pt is a 89yoF with PMH of breast CA, colon CA, htn, hLd, ?afib s/p ppm, who presents to the ED with complaint of abdominal pain and generalized weakness. The patient has had nearly constant RUQ abdominal pain over the last 2-3 weeks with radiation to the back Pt also has +Anemia, r/o GIB, constipation and reports syncope in bathroom. Pt sent to ED by Primary MD.

## 2023-05-16 ENCOUNTER — APPOINTMENT (OUTPATIENT)
Dept: ELECTROPHYSIOLOGY | Facility: CLINIC | Age: 88
End: 2023-05-16
Payer: MEDICARE

## 2023-05-16 ENCOUNTER — NON-APPOINTMENT (OUTPATIENT)
Age: 88
End: 2023-05-16

## 2023-05-16 PROCEDURE — 93296 REM INTERROG EVL PM/IDS: CPT

## 2023-05-16 PROCEDURE — 93294 REM INTERROG EVL PM/LDLS PM: CPT

## 2023-08-16 ENCOUNTER — NON-APPOINTMENT (OUTPATIENT)
Age: 88
End: 2023-08-16

## 2023-08-16 ENCOUNTER — APPOINTMENT (OUTPATIENT)
Dept: ELECTROPHYSIOLOGY | Facility: CLINIC | Age: 88
End: 2023-08-16
Payer: MEDICARE

## 2023-08-16 PROCEDURE — 93294 REM INTERROG EVL PM/LDLS PM: CPT | Mod: NC

## 2023-08-16 PROCEDURE — 93296 REM INTERROG EVL PM/IDS: CPT | Mod: NC

## 2023-08-20 NOTE — H&P ADULT - PROBLEM SELECTOR PLAN 6
No
IMPROVE VTE Individual Risk Assessment  RISK                                                          Points  [] Previous VTE                                           3  [] Thrombophilia                                        2  [] Lower limb paralysis                              2   [] Current Cancer                                       2   [] Immobilization > 24 hrs                        1  [] ICU/CCU stay > 24 hours                       1  [] Age > 60                                                   1  IMPROVE VTE Score = 2  hep sc for DVT chemoprophylaxis

## 2023-09-11 NOTE — CHART NOTE - NSCHARTNOTEFT_GEN_A_CORE
ELECTROPHYSIOLOGY  Device Interrogation Performed                                  Date/Time:  9/12/2017  12:29  :                    Gorb single chamber PPM       Model:         Adapta                           Mode:             VVI                Rate:     50    Atrial Lead:  P wave amplitude:                          mv          Impedence:                   Ohms      Threshold:                V@             ms      Ventricular Lead(s):  RV Lead: R wave amplitude:        2.0-2.8                   mv          Impedence:       304            Ohms      Threshold:       0.75         V@       0.5      ms   LV Lead:  R wave amplitude:                          mv          Impedence:                   Ohms      Threshold:                V@             ms     Battery Status:                     Good      {X}          JOSESITO                     EOL    Underlying Rhythm:                     Atrial fibrillation    {X}                                  Events/Observation: 8/26 recorded one 3 episode of  high ventricular rate event lasted 3 seconds     Impression/Plan: p/w supratherapeutic  INR  +RV lead under sensing seen with over pacing.  Excellent threshold capture.  Reprogrammed RV sensitivity from 2.0 to 1.0 mv to avoid undersensing with over pacing. Vital Signs Last 24 Hrs  T(C): --  T(F): --  HR: --  BP: --  BP(mean): --  RR: --  SpO2: --

## 2023-11-17 ENCOUNTER — NON-APPOINTMENT (OUTPATIENT)
Age: 88
End: 2023-11-17

## 2023-11-17 ENCOUNTER — APPOINTMENT (OUTPATIENT)
Dept: ELECTROPHYSIOLOGY | Facility: CLINIC | Age: 88
End: 2023-11-17
Payer: MEDICARE

## 2023-11-17 PROCEDURE — 93296 REM INTERROG EVL PM/IDS: CPT

## 2023-11-17 PROCEDURE — 93294 REM INTERROG EVL PM/LDLS PM: CPT

## 2023-12-04 NOTE — ED PROVIDER NOTE - QRS
Called patient to schedule 15 min follow up appointment with bar to check on his symptoms. Left patient voicemail to return call.   narrow QRS no BBBs; no Brugada

## 2024-02-16 ENCOUNTER — NON-APPOINTMENT (OUTPATIENT)
Age: 89
End: 2024-02-16

## 2024-02-16 ENCOUNTER — APPOINTMENT (OUTPATIENT)
Dept: ELECTROPHYSIOLOGY | Facility: CLINIC | Age: 89
End: 2024-02-16
Payer: MEDICARE

## 2024-02-16 PROCEDURE — 93296 REM INTERROG EVL PM/IDS: CPT

## 2024-02-16 PROCEDURE — 93294 REM INTERROG EVL PM/LDLS PM: CPT

## 2024-05-10 NOTE — ED PROVIDER NOTE - CONSTITUTIONAL, MLM
ONCOLOGY FOLLOW UP NOTE    Date of Service:  5/10/2024    Primary Care Physician: Dr. Ajay Rodgers    Chief Complaint:   Chief Complaint   Patient presents with    Blood Disorder      Oncology History:    Mr. Kirill Kaiser was seen at [facility] for hematology/m subsequently a colonoscopy was performed which noted a large 4 cm bleeding sigmoid mass.  edical oncology consultation for the above reason.  This consult was personally requested by Dr Mathew He has the following hematological/oncological history:     #1. This is a 89 year old the who was admitted to the hospital August 28, 2020 after a fall, he was diagnosed with subdural hematoma. He has atrial fibrillation and was on anticoagulation with warfarin.  When he was in the hospital he was noted to be anemic      #2.  Colonoscopy was done which showed a large 4 cm bleeding sigmoid mass.  He underwent an open low anterior resection and diverting loop ileostomy by Dr. martinez reassured going to on September 1, 2020. 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  Metastatic adenocarcinoma with focal perinodal spread identified in 1 of 13 regional lymph nodes.  He was staged as a pT2 pN1.  Lymphovascular invasion was not identified, perineural invasion was not identified tumor deposits were not identified.     CT abdomen and pelvis with IV contrast August 31, 2020 noted ill-defined area of thickening of the distal sigmoid call:  Consistent with known sigmoid colonic mass.  Small bilateral pleural effusions.  Right-sided inguinal hernia containing fat and nonobstructed bowel.  Age indeterminate mild inferior endplate L2 superior endplate L3 compression deformities.  CT chest with IV contrast September 4, 2020 noted mild reticular nodular opacities within the bilateral lower lobes posterior upper lobes suggestive  of infectious/inflammatory pneumonitis.  Right lower lobe 14 mm ill-defined nodule suggestive of infectious/inflammatory nodule with metastatic disease felt less likely.     Medical comorbidities of paroxysmal atrial fibrillation, right carotid artery stenosis, rheumatoid arthritis on methotrexate and Plaquenil.        #3.  Patient had prolonged hospitalization, his postoperative case was complicated by development of bowel obstruction and abdominal infection.  His current difficulties have been with his ostomy which appears to be leaking.  He has also had significant weight loss and generalized weakness.  He states he does not need a walker but uses the it to prevent any falls.  Prior to his hospitalization he and his wife had been very active had [place]  The patient had never had a colonoscopy.     Family history is significant for brother who developed lung cancer the context of heavy tobacco use  age 62 years.  Mother  age 96 years from natural causes.  Father  from multiple myeloma age 82 years.    Interval History:    Kirill Kaiser presents today for follow up of the above mentioned complaint. He is now due for next dose of Venofer for treatment of iron deficiency anemia.     Today Kirill Kaiser reports feeling relatively fatigued and weak. He denies any fevers, chills, or night sweats since last visit. He reports a fair appetite.  His weight has stable since last visit. Does report diarrhea. Denies any blood in stool.  He denies nausea, vomiting, constipation, blood in stool, or abdominal pain. He denies any urinary complaints, including gross hematuria or dysuria. Does have mckee catheter in place. Reports shortness of breath with exertion. The patient denies any significant cough or chest pain. He denies unusual headaches or dizziness. He denies any numbness/tingling in the fingers or toes. Currently, the patient denies any pain.He denies any  bleeding diathesis including blood in stool, hematuria, epistaxis, hematemesis, hemoptysis, or gingival bleeding. He denies easy bruising.     Please see the Review of Systems for highlighted pertinent positives.      Review of Systems:  Review of systems reviewed and agree as documented in EPIC per medical assistant.    Physical Exam:  Vitals:  Visit Vitals  /72   Pulse (!) 100   Temp 97.8 °F (36.6 °C) (Temporal)   Resp 16   SpO2 92%       ECOG [05/10/24 0825]   ECOG Performance Status 3      GENERAL: alert, well-nourished, well-developed, and in no apparent distress. Sitting comfortably in wheel chair with wife present during assessment.  NECK:  Supple without masses or thyromegaly.  No jugular venous distension.   HEMATOLOGIC:  No petechiae or purpura.    RESPIRATORY:  Lungs are clear to auscultation without rhonchi or wheezing.   CARDIOVASCULAR:  Regular rate and rhythm of heart without murmurs, gallops or rubs.   ABDOMEN:  Nontender, nondistended, no masses, ascites or hepatosplenomegaly.  Good bowel sounds.  No guarding or rebound tenderness.  No pulsatile masses.   MUSCULOSKELETAL:  No tenderness or swelling, normal range of motion without obvious weakness.   NEUROLOGIC:Coordination normal. No tremor noted. Grossly intact  EXTREMITIES:  No visible deformities, no cyanosis, clubbing or edema.    INTEGUMENTARY:  No rashes, scars, or lesions suggestive of malignancy.  PSYCHIATRIC:Cooperative. Tearful. Sad mood and affect. Normal judgment. Answers questions appropriately.    Weight:  Wt Readings from Last 10 Encounters:   05/05/24 62.6 kg (138 lb)   04/22/24 60.3 kg (133 lb)   10/04/23 63.5 kg (140 lb)   09/18/23 63.1 kg (139 lb 1.6 oz)   07/06/23 64.9 kg (143 lb)   06/05/23 65 kg (143 lb 4.8 oz)   06/13/22 68 kg (150 lb)   04/05/22 68.1 kg (150 lb 1.6 oz)   09/28/21 70 kg (154 lb 4.8 oz)   05/03/21 64.2 kg (141 lb 8.6 oz)       Imaging:  None New    Labs:  Recent Labs   Lab 05/10/24  0819 05/07/24  0801  05/05/24 1035 05/03/24  0835 04/22/24  1001   WBC 16.4* 15.1* 15.9* 12.3* 6.5   RBC 3.01* 2.42* 2.56* 2.53* 2.86*   HGB 8.5* 6.8* 7.0* 7.0* 8.3*   HCT 25.1* 19.8* 21.0* 21.0* 25.6*   MCV 83.4 81.8 82.0 83.0 89.5   * 416 413 399 316   Absolute Neutrophils 15.0* 13.6* 14.3* 11.1* 5.2   Absolute Lymphocytes 0.6* 0.7* 0.6* 0.5* 0.7*   Absolute Monocytes 0.6 0.6 0.8 0.6 0.5   Absolute Eosinophils  0.0 0.0 0.0 0.0 0.0   Absolute Basophils 0.0 0.0 0.0 0.0 0.0     Recent Labs   Lab 05/10/24  0819 05/07/24  0801 05/05/24  1035 05/03/24  0835 04/22/24  1001   Glucose 106* 103* 116* 114* 102*   Sodium 136 131* 132* 133* 138   Potassium 3.5 3.9 4.2 3.9 3.9   Chloride 100 98 99 98 102   BUN 21* 23* 26* 25* 19   Creatinine 0.73 0.83 0.82 0.80 0.76   Calcium 7.9* 7.5* 8.0* 7.6* 8.4   Magnesium  --  1.7  --   --   --    Albumin 1.9* 1.9* 2.1* 2.1* 2.7*   GOT/AST 25 33 29 26 17   Alkaline Phosphatase 103 123* 85 68 77   GPT 42 49 40 29 20     Recent Labs   Lab 05/10/24  0819 05/07/24  0801 05/05/24 1035 05/03/24 0835 04/22/24  1001   Anion Gap 15 15 12 15 12   Globulin 4.2* 4.0 4.3* 4.0 4.0       Advance Directive:  No      Diagnosis:      1. Other iron deficiency anemia  iron sucrose (VENOFER) injection 200 mg    cyanocobalamin injection 1,000 mcg    EPINEPHrine (ADRENALIN) injection 0.3 mg    diphenhydrAMINE (BENADRYL) injection 50 mg    famotidine (PEPCID) injection 20 mg    methylPREDNISolone (SOLU-Medrol) injection 125 mg    albuterol inhaler 2 puff    sodium chloride (NORMAL SALINE) 0.9 % bolus 500 mL    sodium chloride (NORMAL SALINE) 0.9 % bolus 500 mL    sodium chloride 0.9% infusion    sodium chloride 0.9 % flush bag 250 mL           ASSESSMENT:   1. 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  He had a curative resection.  Date of surgery was September 1, 2020. He had prolonged hospital stay due to complications and has borderline performance status, weakness.  He was not prescribed any  adjuvant chemotherapy.  2. Pulmonary nodules   3. Rheumatoid arthritis on methotrexate and Plaquenil.  4. Long-term anticoagulation with warfarin    PLAN/RECOMMENDATIONS:   Continue surveillance per NCCN guidelines.  Patient has refused surveillance colonoscopy.  He is agreeable to continue with CT scan for surveillance.     Patient returned from [place] 2 weeks ago when he was seen in the clinic for his 6 monthly follow-up.  He was looking cachectic and weak.  Hemoglobin was significantly lower than his baseline.  At that point urgent evaluation was recommended to him including endoscopy that he declined.  He also declined hospitalization.  Iron-deficiency was noted for which IV iron therapy was scheduled of which he has only received 2 dose so far.    He comes in today for a follow-up visit and his hemoglobin has improved to  8.5 after receiving 1 unit pRBC. He denies any blood in his stool. Denies any other bleeding concerns. He has an appointment on Monday to follow up with GI. He continue to report weakness, fatigue and shortness of breath with exertion. Patient continues to decline evaluation at the hospital for symptoms. States he is here to receive his iron and will follow up with GI on Monday. Patient was very tearful. Therapeutic listening provided. Did offer cancer counseling services and patient declined referral. Patient to follow up with Dr. Thomas on Monday for close lab monitoring and Venofer 4/5.      FOLLOW UP  1. IV iron today  2. Follow up with Dr. Thomas as scheduled 05/13/2024 CMP CBC// 1 WK MD FU//VENOFER/B12 4 OF 5          The patient and wife indicated understanding of the diagnosis and agreed with the plan of care.  Labs reviewed with patient. Patient is encouraged to call for any interval symptoms or concerns such as fever greater than 100.5, vomiting, constipation or diarrhea, bleeding, or weakness.  Patient verbalizes and is in agreement with recommended plan of care. All questions were  answered to the best of my ability. Thank you for allowing me to participate in the care of this patient. Portions of this note are brought forward from Dr. Thomas's note; reviewed and edited by me as appropriate. Patient seen independently.        Venessa Pappas MSN, APRN-BC  Hematology/Oncology   [facility] [place] [place] and [place]       normal... Well appearing, well nourished, awake, alert, oriented to person, place, time/situation and in no apparent distress.

## 2024-05-14 NOTE — PATIENT PROFILE ADULT - FUNCTIONAL SCREEN CURRENT LEVEL: DRESSING, MLM
Endometrial Biopsy     Pre-Procedure Care:   Consent was obtained.  Procedure/risks were explained.  Questions were answered.  Correct patient was identified.  Correct side and site were confirmed.    Indication: thickened EML with family hx of uterine cancer    Pre-Medications:    The patient was premedicated with Cytotec.    Description of Procedure:   A bivalve speculum was placed in the vagina and the cervix was prepped with betadine solution.   Single tooth tenaculum placed at the 12 o'clock position.   Cervical stenosis encountered.  Minidilators used to dilate cervix.  The endometrial cavity was curetted for pipelle tissue sampling with 2 passes.  Specimen was sent to pathology.   The single tooth tenaculum was removed.   Good hemostasis was noted.  There were no complications.    There was no blood loss.      Discharge instructions were provided to the patient.    
0 = independent

## 2024-05-17 ENCOUNTER — APPOINTMENT (OUTPATIENT)
Dept: ELECTROPHYSIOLOGY | Facility: CLINIC | Age: 89
End: 2024-05-17
Payer: MEDICARE

## 2024-05-17 ENCOUNTER — NON-APPOINTMENT (OUTPATIENT)
Age: 89
End: 2024-05-17

## 2024-05-17 PROCEDURE — 93296 REM INTERROG EVL PM/IDS: CPT

## 2024-05-17 PROCEDURE — 93294 REM INTERROG EVL PM/LDLS PM: CPT

## 2024-08-16 ENCOUNTER — APPOINTMENT (OUTPATIENT)
Dept: ELECTROPHYSIOLOGY | Facility: CLINIC | Age: 89
End: 2024-08-16
Payer: MEDICARE

## 2024-08-16 ENCOUNTER — NON-APPOINTMENT (OUTPATIENT)
Age: 89
End: 2024-08-16

## 2024-08-16 PROCEDURE — 93296 REM INTERROG EVL PM/IDS: CPT

## 2024-08-16 PROCEDURE — 93294 REM INTERROG EVL PM/LDLS PM: CPT

## 2024-11-15 ENCOUNTER — APPOINTMENT (OUTPATIENT)
Dept: ELECTROPHYSIOLOGY | Facility: CLINIC | Age: 89
End: 2024-11-15
Payer: MEDICARE

## 2024-11-15 ENCOUNTER — NON-APPOINTMENT (OUTPATIENT)
Age: 89
End: 2024-11-15

## 2024-11-15 PROCEDURE — 93296 REM INTERROG EVL PM/IDS: CPT

## 2024-11-15 PROCEDURE — 93294 REM INTERROG EVL PM/LDLS PM: CPT

## 2025-01-25 NOTE — PATIENT PROFILE ADULT - NSASFALLATTEMPTOOB_GEN_A_NUR
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  Patient was initially seen in Occupational Health by Dr. Karlo Prado DO on 12/6/2024.  X-ray films of the lumbar spine were completed with mild levoscoliosis noted without evidence of fracture, subluxation or dislocation.  Mild disc space narrowing from L5-S1 with facet degenerative changes were noted with minimal interval change from 1/31/2022 exam.  Patient was diagnosed with right right-sided low back pain with question of radiculopathy versus sciatica given exam, mechanism of injury, and presenting symptoms. Recommendation for conservative treatment with referrals to physical therapy and pain management as well as prescriptions for meloxicam as well as tizanidine were provided to the patient.  Patient initially had a positive response to conservative treatment with physical therapy allowing him to transition to a work conditioning program with periodic use of pain medication use.  However, during 1/17/25 visit in Occupational Health, patient reported return of back pain with radicular symptoms to the bilateral lower extremities.  Patient reported inability to wean off his meloxicam use daily.  Patient reported reproducible bilateral lower extremity symptoms extending into the gluteal region and thighs when sedentary for approximately 15-20 minutes duration without radiation to the knee. Patient denied changes in bowel or bladder function, or any weakness in his legs. Patient reported avoidance of prolonged sedentary activities with need to often changing his  position. Lumbar MRI imaging was ordered for further evaluation of the area along with referral to Pain Management to help with symptoms.  Patient was able to continue physical therapy with use of Meloxicam and Tizandine as needed for pain.  Patient's work restrictions were unchanged with continued recommendation for avoidance of provocative bending or rotation with limitations of up to 30 pounds of weight lifting up to chest level. Patient was last seen in physical therapy on 1/27/25 with therapy notes reflecting 2/10 pain with ability to complete exercises with 2/10 pain at the close of the session.  Patient has been seen for 2 total work conditioning visits.     Today in office, patient reports his low back pain to be a 2/10 at rest and with activity following completion of his work conditioning session prior to clinic arrival.  Patient reported that he did have some mild right gluteal and thigh symptoms this morning which was more pronounced upon waking this morning with resolve upon resumption of activity and has not returned. He continues to experience leg symptoms after approximately 15 to 20 minutes of inactivity, although there has been no exacerbation of these symptoms since his last visit. He expresses concern about the potential impact of returning to full-time work, particularly the prospect of spending the majority of the day in a hunched-over position. He recalls an incident where he was unable to put on his shoes the following day after working in such a posture. He reports no new symptoms and believes he has sufficient medication refills to last until his next appointment in 2 weeks. He has been managing his pain with meloxicam and has not been using tizanidine or any sleep aids. He reports no issues with sleep and notes that the muscle relaxer primarily induces drowsiness. He has been experiencing persistent pain for the past year, which he describes as normal and largely unchanged. He has been  tolerating his work restrictions and feels they are appropriate. Patient denies any new symptoms since being seen last in clinic. Patient denies any other concerns. Patient reports that he did contact Pain Management but asked the  to have the [place] office contact him and has not heard back yet and wonders if provider could assist in a telephone number at the close of the office visit.    Pertinent PMH:  1/28/22: Low back injury - Valencia Hunt NP 1/30/2022 where he reported 2 days prior lifting a heavy piece of equipment in his basement.  He reported worsening lumbago to the point of having difficulty ambulating, failing to respond to icing and ibuprofen denying radicular symptoms at that time.  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  He demonstrated a positive straight leg raise bilaterally with the R lower extremity reported to have worsened symptoms on that initial presentation.  He was diagnosed with a lumbar strain and spasm.    2/5/2022: UC Visit where he reported he felt like his legs were about to give out, his chiropractor ordered lumbar spine x-rays which demonstrated disc height loss and facet arthropathy at L5-S1.  His chiropractor intended to order a MRI of the following week.  It appears that he was given a trial of prednisone at that visit.    2/14/22: MRI of the lumbar spine was obtained without contrast demonstrating mild retrolisthesis of L5 relative to S1 along with mild degenerative endplate signal changes surrounding L5-S1 with multiple scattered scattered small Schmorl nodes.  No destructive lesions were identified with minimal symmetric disc bulging throughout L2-L5.  There is mild bilateral subarticular zone narrowing at L4 and L5 with a mild disc bulge slightly asymmetric to the left  with left paracentral disc protrusion that approximates the descending left S1 nerve root without definite mass effect, disc material contacts the extraforaminal left 5th nerve root.  There is no foraminal stenosis determined to be significant on that MRI study.    Physical therapy where he attended 2 sessions with his last session 3/18/2022 where he reported persistent symptoms but was progressing/improving.  He was instructed to follow-up in 3 to 4 weeks.  It appears based on chart review he did not do so, as there are no other PT notes available for review from that time in the EMR.    11/16/23: Dr. Feliz Shaver DC [facility] with Meloxicam and Flexeril.    Patient remained under his care and received chiropractic manipulations of the lumbar spine in conjunction with physical therapy.    Patient did receive an MRI of the lumbar spine on 1/25/2024 indicating left subarticular material present at the lumbosacral junction thought to be atypical but could be associated with disc extrusion.  Clinical correlation was recommended with consideration of contrast studies to be completed in follow-up. No clear indication of MRI results discussion is found in EMR by evaluating provider.    Patient continued chiropractic intervention and PT unil he was discharged from care on 3/8/2024 due to without any noted permanent disability or functional limitations.  He was reported to have no disability at that time and on WKC16 4/9/2024.     ROS:  A complete ROS was obtained during the intake portion of today's visit, reviewed by myself with the patient, and is available in the Epic chart and, other than what is mentioned above, does not contribute to his current problem.  I have personally reviewed and updated the following EPIC sections:Current Medications, Allergies, Problem List, Social History , Past Medical History, and Past Surgical  History    OBJECTIVE:  Visit Vitals  /74 (BP Location: LUE - Left upper extremity, Patient Position: Sitting, Cuff Size: Large Adult)   Pulse 80       No examination is completed at time of visit. Visit is spent 100% in consultation with patient only.    DIAGNOSTICS:  Provider reviews results of MRI in detail with patient during visit.  MRI LUMBAR SPINE WO CONTRAST Result Date: 1/24/2025  HISTORY: Refractory lumbar spine pain.   COMPARISON: 1/25/2024.   TECHNIQUE: MRI of the lumbar spine without IV contrast.   FINDINGS:   Conus and cauda equina: The conus terminates at T12. No intramedullary masses or lesions are identified.   T12-L1: Small broad-based posterior disc bulge. No spinal stenosis. The neural foramina are widely patent.   L1-L2: Normal.   L2-L3: Normal.   L3-L4: Small broad-based posterior disc bulge. No spinal stenosis. The neural foramina are widely patent.   L4-L5: Mild facet arthropathy with ligament flavum hypertrophy. Small broad-based posterior disc bulge extending to the foraminal regions with associated moderate narrowing the neural foramina bilaterally. No spinal stenosis.   L5-S1: Mild facet arthropathy bilaterally. There is a small to moderate broad-based posterior disc bulge with a superimposed small left paracentral disc extrusion extending superiorly. This disc extrusion may be impinging upon the left S1 nerve root as it courses through the spinal canal. No spinal stenosis. Neural foramina may remain widely patent.   Bone marrow: Small hemangioma within S1.   Soft tissues: Normal.     IMPRESSION: Recurrent small to moderate left paracentral disc bulge at L5-S1 with a new superiorly extending small paracentral disc extrusion. This may be impinging upon the left S1 nerve root as it courses through the spinal canal. Slight worsening of degenerative disc disease at L4-L5 resulting in moderate narrowing of the neural foramina bilaterally.     ASSESSMENT:    1. Low back pain radiating to  right leg    2. Strain of lumbar region, subsequent encounter    3. Encounter related to worker's compensation claim    4. Acute right-sided back pain with sciatica    5. Lumbar disc herniation with radiculopathy    6. Osteoarthritis of spine with radiculopathy, lumbar region      PLAN:  1. Return to work note is provided to the patient with return to work as of 1/27/2025 outlining restrictions to include:     1. Limit 30 pounds push, pull, lift, or carry.   2. Keep all work close to body.   3. No bending at waist or twisting.   4. No overhead lifting, pulling, or carrying.  5. May change positions (standing/sitting/walking) as needed for comfort.  6. Discontinue any activities that increase symptomatology.   2. Additional instructions for patient to include:   1. Provider reviews results of Lumbar MRI in detail with patient during visit to indicate small to moderate L5-S1 disc herniation with new superiorly extending small paracentral disc extrusion that may be impinging on S1 with worsening of L4-L5 degenerative disc disease resulting in narrowing bilateral neural foramina.   2. Recommend referral to Neurosurgery at this time given PMH and severity of symptoms. Please call [phone] to make an appointment in Neurosurgery.  [place] scheduling for pain management has the same number as [facility] [phone].  3. May continue physical therapy/work conditioning activities with continued recommendation for consultation with neurosurgery and pain management.   4. No change in work restrictions at this time.   5. Patient is strongly cautioned by provider that any escalation in pain symptoms or any indications of red alarm symptoms (saddle anesthesia, changes in bowel or bladder function, worsening of paresthesias, or loss of lower extremity function) would warrant emergent notification.  6. Patient is advised to follow up in Occupational Health in 2 weeks.      3. A total of 20 minutes was spent with  this patient face-to-face, more than 50% of which was spent in coordination of care, counseling/education regarding:   - Provider reviews results of Lumbar MRI in detail with patient during visit to indicate small to moderate L5-S1 disc herniation with new superiorly extending small paracentral disc extrusion that may be impinging on S1 with worsening of L4-L5 degenerative disc disease resulting in narrowing bilateral neural foramina.   - Recommend referral to Neurosurgery at this time given PMH and severity of symptoms. Please call [phone] to make an appointment in Neurosurgery.  [place] scheduling for pain management has the same number as [facility] - [phone].  - May continue physical therapy/work conditioning activities with continued recommendation for consultation with neurosurgery and pain management.   - No change in work restrictions at this time.   - Patient is strongly cautioned by provider that any escalation in pain symptoms or any indications of red alarm symptoms (saddle anesthesia, changes in bowel or bladder function, worsening of paresthesias, or loss of lower extremity function) would warrant emergent notification.  - Patient is advised to follow up in Occupational Health in 2 weeks.  - Return to work issues reviewed with patient and restrictions mutually agreed upon as above.   - Interactive questions and answers as detailed above were answered to patient satisfaction. Patient verbalizes understanding, is in agreement plan of care, and has no further questions or concerns.   4. Follow up appointment is scheduled for: Return in about 2 weeks (around 2/10/2025) for work injury follow up.  5. Patient encouraged to return to clinic if new symptoms develop, symptoms worsen, fail to improve, reaction to treatment occurs.    Worker compensation evaluations are reviewed within an acceptable time frame by Dr. Karlo Prado DO, my collaborating physician.          no

## 2025-02-11 DIAGNOSIS — I49.5 SICK SINUS SYNDROME: ICD-10-CM

## 2025-02-14 ENCOUNTER — APPOINTMENT (OUTPATIENT)
Dept: ELECTROPHYSIOLOGY | Facility: CLINIC | Age: 89
End: 2025-02-14

## 2025-02-14 PROCEDURE — 93294 REM INTERROG EVL PM/LDLS PM: CPT

## 2025-02-14 PROCEDURE — 93296 REM INTERROG EVL PM/IDS: CPT

## 2025-03-26 NOTE — ASU PATIENT PROFILE, ADULT - NS PREOP UNDERSTANDS INFO
yes Patient advised regarding escort to drive patient home upon discharge. Verbalized understanding./yes

## 2025-03-27 ENCOUNTER — OUTPATIENT (OUTPATIENT)
Dept: OUTPATIENT SERVICES | Facility: HOSPITAL | Age: 89
LOS: 1 days | Discharge: HOME | End: 2025-03-27
Payer: MEDICARE

## 2025-03-27 ENCOUNTER — TRANSCRIPTION ENCOUNTER (OUTPATIENT)
Age: 89
End: 2025-03-27

## 2025-03-27 VITALS
HEART RATE: 71 BPM | SYSTOLIC BLOOD PRESSURE: 146 MMHG | RESPIRATION RATE: 16 BRPM | DIASTOLIC BLOOD PRESSURE: 66 MMHG | OXYGEN SATURATION: 96 %

## 2025-03-27 VITALS
HEIGHT: 61 IN | TEMPERATURE: 97 F | WEIGHT: 126.99 LBS | SYSTOLIC BLOOD PRESSURE: 179 MMHG | RESPIRATION RATE: 17 BRPM | HEART RATE: 64 BPM | DIASTOLIC BLOOD PRESSURE: 76 MMHG | OXYGEN SATURATION: 94 %

## 2025-03-27 DIAGNOSIS — Z98.49 CATARACT EXTRACTION STATUS, UNSPECIFIED EYE: Chronic | ICD-10-CM

## 2025-03-27 DIAGNOSIS — I49.5 SICK SINUS SYNDROME: ICD-10-CM

## 2025-03-27 DIAGNOSIS — Z90.10 ACQUIRED ABSENCE OF UNSPECIFIED BREAST AND NIPPLE: Chronic | ICD-10-CM

## 2025-03-27 DIAGNOSIS — Z95.828 PRESENCE OF OTHER VASCULAR IMPLANTS AND GRAFTS: Chronic | ICD-10-CM

## 2025-03-27 DIAGNOSIS — Z90.710 ACQUIRED ABSENCE OF BOTH CERVIX AND UTERUS: Chronic | ICD-10-CM

## 2025-03-27 DIAGNOSIS — Z95.0 PRESENCE OF CARDIAC PACEMAKER: Chronic | ICD-10-CM

## 2025-03-27 PROBLEM — I10 ESSENTIAL (PRIMARY) HYPERTENSION: Chronic | Status: INACTIVE | Noted: 2019-08-06 | Resolved: 2025-03-27

## 2025-03-27 PROBLEM — C18.9 MALIGNANT NEOPLASM OF COLON, UNSPECIFIED: Chronic | Status: INACTIVE | Noted: 2019-08-07 | Resolved: 2025-03-27

## 2025-03-27 LAB
ANION GAP SERPL CALC-SCNC: 13 MMOL/L — SIGNIFICANT CHANGE UP (ref 7–14)
BUN SERPL-MCNC: 28 MG/DL — HIGH (ref 7–23)
CALCIUM SERPL-MCNC: 9.5 MG/DL — SIGNIFICANT CHANGE UP (ref 8.4–10.5)
CHLORIDE SERPL-SCNC: 112 MMOL/L — HIGH (ref 98–107)
CO2 SERPL-SCNC: 19 MMOL/L — LOW (ref 22–31)
CREAT SERPL-MCNC: 1.26 MG/DL — SIGNIFICANT CHANGE UP (ref 0.5–1.3)
EGFR: 39 ML/MIN/1.73M2 — LOW
EGFR: 39 ML/MIN/1.73M2 — LOW
GLUCOSE SERPL-MCNC: 99 MG/DL — SIGNIFICANT CHANGE UP (ref 70–99)
HCT VFR BLD CALC: 35.4 % — SIGNIFICANT CHANGE UP (ref 34.5–45)
HGB BLD-MCNC: 11.4 G/DL — LOW (ref 11.5–15.5)
MCHC RBC-ENTMCNC: 26.7 PG — LOW (ref 27–34)
MCHC RBC-ENTMCNC: 32.2 G/DL — SIGNIFICANT CHANGE UP (ref 32–36)
MCV RBC AUTO: 82.9 FL — SIGNIFICANT CHANGE UP (ref 80–100)
NRBC # BLD AUTO: 0 K/UL — SIGNIFICANT CHANGE UP (ref 0–0)
NRBC # FLD: 0 K/UL — SIGNIFICANT CHANGE UP (ref 0–0)
NRBC BLD AUTO-RTO: 0 /100 WBCS — SIGNIFICANT CHANGE UP (ref 0–0)
PLATELET # BLD AUTO: 197 K/UL — SIGNIFICANT CHANGE UP (ref 150–400)
POTASSIUM SERPL-MCNC: 4.2 MMOL/L — SIGNIFICANT CHANGE UP (ref 3.5–5.3)
POTASSIUM SERPL-SCNC: 4.2 MMOL/L — SIGNIFICANT CHANGE UP (ref 3.5–5.3)
RBC # BLD: 4.27 M/UL — SIGNIFICANT CHANGE UP (ref 3.8–5.2)
RBC # FLD: 17.5 % — HIGH (ref 10.3–14.5)
SODIUM SERPL-SCNC: 144 MMOL/L — SIGNIFICANT CHANGE UP (ref 135–145)
WBC # BLD: 5.95 K/UL — SIGNIFICANT CHANGE UP (ref 3.8–10.5)
WBC # FLD AUTO: 5.95 K/UL — SIGNIFICANT CHANGE UP (ref 3.8–10.5)

## 2025-03-27 PROCEDURE — 33227 REMOVE&REPLACE PM GEN SINGL: CPT

## 2025-03-27 PROCEDURE — 93010 ELECTROCARDIOGRAM REPORT: CPT

## 2025-03-27 RX ORDER — METOPROLOL SUCCINATE 50 MG/1
1 TABLET, EXTENDED RELEASE ORAL
Refills: 0 | DISCHARGE

## 2025-03-27 RX ORDER — APIXABAN 2.5 MG/1
1 TABLET, FILM COATED ORAL
Qty: 0 | Refills: 0 | DISCHARGE

## 2025-03-27 RX ORDER — FENTANYL CITRATE-0.9 % NACL/PF 100MCG/2ML
25 SYRINGE (ML) INTRAVENOUS
Refills: 0 | Status: DISCONTINUED | OUTPATIENT
Start: 2025-03-27 | End: 2025-03-27

## 2025-03-27 RX ORDER — FUROSEMIDE 10 MG/ML
1 INJECTION INTRAMUSCULAR; INTRAVENOUS
Refills: 0 | DISCHARGE

## 2025-03-27 RX ORDER — LOSARTAN POTASSIUM 100 MG/1
1 TABLET, FILM COATED ORAL
Refills: 0 | DISCHARGE

## 2025-03-27 RX ORDER — NIFEDIPINE 30 MG
1 TABLET, EXTENDED RELEASE 24 HR ORAL
Refills: 0 | DISCHARGE

## 2025-03-27 RX ORDER — ONDANSETRON HCL/PF 4 MG/2 ML
4 VIAL (ML) INJECTION ONCE
Refills: 0 | Status: DISCONTINUED | OUTPATIENT
Start: 2025-03-27 | End: 2025-03-27

## 2025-03-27 NOTE — H&P CARDIOLOGY - NSICDXPASTMEDICALHX_GEN_ALL_CORE_FT
PAST MEDICAL HISTORY:  Atrial fibrillation     Breast cancer     Breast cancer     Colon cancer     Colon cancer     HLD (hyperlipidemia)     HTN (hypertension)     HTN (hypertension)     Pacemaker     Renal insufficiency      PAST MEDICAL HISTORY:  Atrial fibrillation     Breast cancer     Cardiac pacemaker     Colon cancer     HLD (hyperlipidemia)     HTN (hypertension)     Pacemaker     Renal insufficiency

## 2025-03-27 NOTE — ASU DISCHARGE PLAN (ADULT/PEDIATRIC) - NS MD DC FALL RISK RISK
For information on Fall & Injury Prevention, visit: [email or web address] OR  [email or web address] OR  https://www.cdc.gov/steadi/patient.html

## 2025-03-27 NOTE — ASU DISCHARGE PLAN (ADULT/PEDIATRIC) - ASU DC SPECIAL INSTRUCTIONSFT
pt. s/p pacemaker generator change    L ACW site  was pressure dressed. It was  clean, dry, and intact. No bleeding, drainage hematoma, or ecchymosis appreciated.        Post-op PPM generator change instruction has been verbally explained and given to the patient. Patient was also given home monitor, booklet and ID card. Patient expressed understanding and all questions were answered. A copy of the instruction is given to the patient  Patient is schedule for an appointment on 4/11 at 11am in the EP Clinic ( 4th floor Oncology building [facility] [address], [place], [place], [address] [phone] )    No scrubbing the incision site for 2 weeks  No lotion, ointment, powder or direct sunlight to the incision site for 2 weeks   No lifting more than 5lb or exertional exercising such as jogging, running, bike riding for 6-8 weeks  Do not get the surgical incision wet for 1 week  No swimming pool, Jacuzzi, or bath for 6-8 weeks.   You should carry your ID card for metal detectors   Remove bandage after 24-48hours  Patient can shower 24 hours after procedure. Pat the area dry  Keep Cellular phone 6 in away  from the device  Pt was instructed to call [phone] if the following occurs:      - fever with temperature > 100.6      - swelling, drainage or bleeding at the site incision       - chest pain, SOB, n/v pt. s/p pacemaker generator change    R ACW site  was pressure dressed. It was  clean, dry, and intact. No bleeding, drainage hematoma, or ecchymosis appreciated.        Post-op PPM generator change instruction has been verbally explained and given to the patient. Patient was also given home monitor, booklet and ID card. Patient expressed understanding and all questions were answered. A copy of the instruction is given to the patient  Patient is schedule for an appointment on 4/11 at 11am in the EP Clinic ( 4th floor Oncology building [facility] [address], [address], [place], [place], [address] [phone] )    No scrubbing the incision site for 2 weeks  No lotion, ointment, powder or direct sunlight to the incision site for 2 weeks   No lifting more than 5lb or exertional exercising such as jogging, running, bike riding for 6-8 weeks  Do not get the surgical incision wet for 1 week  No swimming pool, Jacuzzi, or bath for 6-8 weeks.   You should carry your ID card for metal detectors   Remove bandage after 24-48hours  Patient can shower 24 hours after procedure. Pat the area dry  Keep Cellular phone 6 in away  from the device  Pt was instructed to call [phone] if the following occurs:      - fever with temperature > 100.6      - swelling, drainage or bleeding at the site incision       - chest pain, SOB, n/v

## 2025-03-27 NOTE — ASU DISCHARGE PLAN (ADULT/PEDIATRIC) - FINANCIAL ASSISTANCE
[facility] provides services at a reduced cost to those who are determined to be eligible through [facility]’s financial assistance program. Information regarding [facility]’s financial assistance program can be found by going to [email or web address] or by calling [phone].

## 2025-03-27 NOTE — H&P CARDIOLOGY - NSICDXPASTSURGICALHX_GEN_ALL_CORE_FT
PAST SURGICAL HISTORY:  H/O mastectomy     S/P cataract surgery     S/P colon resection 1993 for colon ca.    S/P colon resection     S/P hysterectomy     S/P hysterectomy     S/P mastectomy 1990    S/P peripheral artery bypass in 1994    S/P vascular bypass      PAST SURGICAL HISTORY:  Cardiac pacemaker     S/P cataract surgery     S/P colon resection 1993 for colon ca.    S/P hysterectomy     S/P mastectomy 1990    S/P peripheral artery bypass in 1994

## 2025-03-27 NOTE — ASU DISCHARGE PLAN (ADULT/PEDIATRIC) - CARE PROVIDER_API CALL
Mario Rockwell  Cardiovascular Disease  [address] [address] [place] [address], [address]  [place] [address]  Phone: [phone]  Fax: [phone]  Follow Up Time:

## 2025-03-27 NOTE — H&P CARDIOLOGY - HISTORY OF PRESENT ILLNESS
89 y.o. female presents today for elective PPM generator change since the device is at EOL.  89 y.o. female presents today for elective PPM generator change since the device is at EOL. The patient denies chest pain, SOB, palpitations, dizziness, presyncope, syncope,  headache, visual disturbances, CVA, PE, DVT, HASMUKH, abdominal pain, N/V/D/C, hematochezia, melena, dysuria, hematuria, fever, chills.  Last dose of Eliquis was taken on 3/26/25 PM

## 2025-04-10 ENCOUNTER — NON-APPOINTMENT (OUTPATIENT)
Age: 89
End: 2025-04-10

## 2025-04-10 ENCOUNTER — APPOINTMENT (OUTPATIENT)
Dept: ELECTROPHYSIOLOGY | Facility: CLINIC | Age: 89
End: 2025-04-10
Payer: MEDICARE

## 2025-04-10 VITALS — HEART RATE: 60 BPM | SYSTOLIC BLOOD PRESSURE: 180 MMHG | DIASTOLIC BLOOD PRESSURE: 86 MMHG

## 2025-04-10 VITALS — DIASTOLIC BLOOD PRESSURE: 80 MMHG | SYSTOLIC BLOOD PRESSURE: 167 MMHG

## 2025-04-10 PROBLEM — Z95.0 PRESENCE OF CARDIAC PACEMAKER: Chronic | Status: ACTIVE | Noted: 2025-03-27

## 2025-04-10 PROCEDURE — 99024 POSTOP FOLLOW-UP VISIT: CPT

## 2025-04-10 RX ORDER — METOPROLOL SUCCINATE 25 MG/1
25 TABLET, EXTENDED RELEASE ORAL
Refills: 0 | Status: ACTIVE | COMMUNITY

## 2025-04-10 RX ORDER — NIFEDIPINE 30 MG/1
30 TABLET, EXTENDED RELEASE ORAL DAILY
Refills: 0 | Status: ACTIVE | COMMUNITY

## 2025-04-10 RX ORDER — LOSARTAN POTASSIUM 50 MG/1
50 TABLET, FILM COATED ORAL
Refills: 0 | Status: ACTIVE | COMMUNITY

## 2025-04-10 RX ORDER — FUROSEMIDE 20 MG/1
20 TABLET ORAL
Refills: 0 | Status: ACTIVE | COMMUNITY

## 2025-05-07 ENCOUNTER — TRANSCRIPTION ENCOUNTER (OUTPATIENT)
Age: 89
End: 2025-05-07

## 2025-05-07 ENCOUNTER — NON-APPOINTMENT (OUTPATIENT)
Age: 89
End: 2025-05-07

## 2025-05-09 ENCOUNTER — INPATIENT (INPATIENT)
Facility: HOSPITAL | Age: 89
LOS: 16 days | Discharge: HOME | DRG: 291 | End: 2025-05-26
Attending: INTERNAL MEDICINE | Admitting: INTERNAL MEDICINE
Payer: MEDICARE

## 2025-05-09 VITALS
HEART RATE: 102 BPM | SYSTOLIC BLOOD PRESSURE: 153 MMHG | TEMPERATURE: 98 F | HEIGHT: 61 IN | DIASTOLIC BLOOD PRESSURE: 79 MMHG | OXYGEN SATURATION: 92 % | RESPIRATION RATE: 20 BRPM

## 2025-05-09 DIAGNOSIS — Z29.9 ENCOUNTER FOR PROPHYLACTIC MEASURES, UNSPECIFIED: ICD-10-CM

## 2025-05-09 DIAGNOSIS — Z95.0 PRESENCE OF CARDIAC PACEMAKER: Chronic | ICD-10-CM

## 2025-05-09 DIAGNOSIS — J90 PLEURAL EFFUSION, NOT ELSEWHERE CLASSIFIED: ICD-10-CM

## 2025-05-09 DIAGNOSIS — J96.01 ACUTE RESPIRATORY FAILURE WITH HYPOXIA: ICD-10-CM

## 2025-05-09 DIAGNOSIS — I48.20 CHRONIC ATRIAL FIBRILLATION, UNSPECIFIED: ICD-10-CM

## 2025-05-09 DIAGNOSIS — Z98.49 CATARACT EXTRACTION STATUS, UNSPECIFIED EYE: Chronic | ICD-10-CM

## 2025-05-09 DIAGNOSIS — E78.5 HYPERLIPIDEMIA, UNSPECIFIED: ICD-10-CM

## 2025-05-09 DIAGNOSIS — N18.9 CHRONIC KIDNEY DISEASE, UNSPECIFIED: ICD-10-CM

## 2025-05-09 DIAGNOSIS — I10 ESSENTIAL (PRIMARY) HYPERTENSION: ICD-10-CM

## 2025-05-09 DIAGNOSIS — D64.9 ANEMIA, UNSPECIFIED: ICD-10-CM

## 2025-05-09 LAB
ALBUMIN SERPL ELPH-MCNC: 3.2 G/DL — LOW (ref 3.5–5)
ALP SERPL-CCNC: 64 U/L — SIGNIFICANT CHANGE UP (ref 40–120)
ALT FLD-CCNC: 24 U/L DA — SIGNIFICANT CHANGE UP (ref 10–60)
ANION GAP SERPL CALC-SCNC: 8 MMOL/L — SIGNIFICANT CHANGE UP (ref 5–17)
APTT BLD: 30.1 SEC — SIGNIFICANT CHANGE UP (ref 26.1–36.8)
AST SERPL-CCNC: 20 U/L — SIGNIFICANT CHANGE UP (ref 10–40)
BASOPHILS # BLD AUTO: 0.06 K/UL — SIGNIFICANT CHANGE UP (ref 0–0.2)
BASOPHILS NFR BLD AUTO: 0.7 % — SIGNIFICANT CHANGE UP (ref 0–2)
BILIRUB SERPL-MCNC: 0.8 MG/DL — SIGNIFICANT CHANGE UP (ref 0.2–1.2)
BUN SERPL-MCNC: 88 MG/DL — HIGH (ref 7–18)
CALCIUM SERPL-MCNC: 9.4 MG/DL — SIGNIFICANT CHANGE UP (ref 8.4–10.5)
CHLORIDE SERPL-SCNC: 115 MMOL/L — HIGH (ref 96–108)
CO2 SERPL-SCNC: 22 MMOL/L — SIGNIFICANT CHANGE UP (ref 22–31)
CREAT SERPL-MCNC: 1.66 MG/DL — HIGH (ref 0.5–1.3)
EGFR: 28 ML/MIN/1.73M2 — LOW
EGFR: 28 ML/MIN/1.73M2 — LOW
EOSINOPHIL # BLD AUTO: 0.1 K/UL — SIGNIFICANT CHANGE UP (ref 0–0.5)
EOSINOPHIL NFR BLD AUTO: 1.1 % — SIGNIFICANT CHANGE UP (ref 0–6)
FLUAV AG NPH QL: SIGNIFICANT CHANGE UP
FLUBV AG NPH QL: SIGNIFICANT CHANGE UP
GLUCOSE SERPL-MCNC: 103 MG/DL — HIGH (ref 70–99)
HCT VFR BLD CALC: 25.2 % — LOW (ref 34.5–45)
HGB BLD-MCNC: 8 G/DL — LOW (ref 11.5–15.5)
IMM GRANULOCYTES NFR BLD AUTO: 0.6 % — SIGNIFICANT CHANGE UP (ref 0–0.9)
INR BLD: 1.46 RATIO — HIGH (ref 0.85–1.16)
LACTATE SERPL-SCNC: 1.3 MMOL/L — SIGNIFICANT CHANGE UP (ref 0.7–2)
LACTATE SERPL-SCNC: 3.3 MMOL/L — HIGH (ref 0.7–2)
LYMPHOCYTES # BLD AUTO: 1.03 K/UL — SIGNIFICANT CHANGE UP (ref 1–3.3)
LYMPHOCYTES # BLD AUTO: 11.6 % — LOW (ref 13–44)
MCHC RBC-ENTMCNC: 27.3 PG — SIGNIFICANT CHANGE UP (ref 27–34)
MCHC RBC-ENTMCNC: 31.7 G/DL — LOW (ref 32–36)
MCV RBC AUTO: 86 FL — SIGNIFICANT CHANGE UP (ref 80–100)
MONOCYTES # BLD AUTO: 0.78 K/UL — SIGNIFICANT CHANGE UP (ref 0–0.9)
MONOCYTES NFR BLD AUTO: 8.8 % — SIGNIFICANT CHANGE UP (ref 2–14)
NEUTROPHILS # BLD AUTO: 6.89 K/UL — SIGNIFICANT CHANGE UP (ref 1.8–7.4)
NEUTROPHILS NFR BLD AUTO: 77.2 % — HIGH (ref 43–77)
NRBC BLD AUTO-RTO: 0 /100 WBCS — SIGNIFICANT CHANGE UP (ref 0–0)
NT-PROBNP SERPL-SCNC: 2321 PG/ML — HIGH (ref 0–450)
PLATELET # BLD AUTO: 211 K/UL — SIGNIFICANT CHANGE UP (ref 150–400)
POTASSIUM SERPL-MCNC: 3.9 MMOL/L — SIGNIFICANT CHANGE UP (ref 3.5–5.3)
POTASSIUM SERPL-SCNC: 3.9 MMOL/L — SIGNIFICANT CHANGE UP (ref 3.5–5.3)
PROT SERPL-MCNC: 6.3 G/DL — SIGNIFICANT CHANGE UP (ref 6–8.3)
PROTHROM AB SERPL-ACNC: 16.9 SEC — HIGH (ref 9.9–13.4)
RBC # BLD: 2.93 M/UL — LOW (ref 3.8–5.2)
RBC # FLD: 17.8 % — HIGH (ref 10.3–14.5)
RSV RNA NPH QL NAA+NON-PROBE: SIGNIFICANT CHANGE UP
SARS-COV-2 RNA SPEC QL NAA+PROBE: SIGNIFICANT CHANGE UP
SODIUM SERPL-SCNC: 145 MMOL/L — SIGNIFICANT CHANGE UP (ref 135–145)
SOURCE RESPIRATORY: SIGNIFICANT CHANGE UP
TROPONIN I, HIGH SENSITIVITY RESULT: 36.3 NG/L — SIGNIFICANT CHANGE UP
TROPONIN I, HIGH SENSITIVITY RESULT: 40.3 NG/L — SIGNIFICANT CHANGE UP
WBC # BLD: 8.91 K/UL — SIGNIFICANT CHANGE UP (ref 3.8–10.5)
WBC # FLD AUTO: 8.91 K/UL — SIGNIFICANT CHANGE UP (ref 3.8–10.5)

## 2025-05-09 PROCEDURE — 71275 CT ANGIOGRAPHY CHEST: CPT | Mod: 26

## 2025-05-09 PROCEDURE — 99291 CRITICAL CARE FIRST HOUR: CPT

## 2025-05-09 PROCEDURE — 71045 X-RAY EXAM CHEST 1 VIEW: CPT | Mod: 26

## 2025-05-09 PROCEDURE — 99223 1ST HOSP IP/OBS HIGH 75: CPT

## 2025-05-09 RX ORDER — ACETAMINOPHEN 500 MG/5ML
650 LIQUID (ML) ORAL EVERY 6 HOURS
Refills: 0 | Status: DISCONTINUED | OUTPATIENT
Start: 2025-05-09 | End: 2025-05-26

## 2025-05-09 RX ORDER — METOPROLOL SUCCINATE 50 MG/1
25 TABLET, EXTENDED RELEASE ORAL DAILY
Refills: 0 | Status: DISCONTINUED | OUTPATIENT
Start: 2025-05-09 | End: 2025-05-26

## 2025-05-09 RX ORDER — ATORVASTATIN CALCIUM 80 MG/1
40 TABLET, FILM COATED ORAL AT BEDTIME
Refills: 0 | Status: DISCONTINUED | OUTPATIENT
Start: 2025-05-09 | End: 2025-05-26

## 2025-05-09 RX ORDER — MELATONIN 5 MG
3 TABLET ORAL AT BEDTIME
Refills: 0 | Status: DISCONTINUED | OUTPATIENT
Start: 2025-05-09 | End: 2025-05-11

## 2025-05-09 RX ORDER — ONDANSETRON HCL/PF 4 MG/2 ML
4 VIAL (ML) INJECTION EVERY 8 HOURS
Refills: 0 | Status: DISCONTINUED | OUTPATIENT
Start: 2025-05-09 | End: 2025-05-11

## 2025-05-09 RX ORDER — MAGNESIUM, ALUMINUM HYDROXIDE 200-200 MG
30 TABLET,CHEWABLE ORAL EVERY 4 HOURS
Refills: 0 | Status: DISCONTINUED | OUTPATIENT
Start: 2025-05-09 | End: 2025-05-11

## 2025-05-09 RX ORDER — FUROSEMIDE 10 MG/ML
40 INJECTION INTRAMUSCULAR; INTRAVENOUS ONCE
Refills: 0 | Status: COMPLETED | OUTPATIENT
Start: 2025-05-09 | End: 2025-05-09

## 2025-05-09 RX ORDER — APIXABAN 2.5 MG/1
2.5 TABLET, FILM COATED ORAL EVERY 12 HOURS
Refills: 0 | Status: DISCONTINUED | OUTPATIENT
Start: 2025-05-09 | End: 2025-05-10

## 2025-05-09 RX ORDER — CEFTRIAXONE 500 MG/1
2000 INJECTION, POWDER, FOR SOLUTION INTRAMUSCULAR; INTRAVENOUS ONCE
Refills: 0 | Status: COMPLETED | OUTPATIENT
Start: 2025-05-09 | End: 2025-05-09

## 2025-05-09 RX ORDER — DEXTROMETHORPHAN HBR, GUAIFENESIN 200 MG/10ML
200 LIQUID ORAL EVERY 6 HOURS
Refills: 0 | Status: DISCONTINUED | OUTPATIENT
Start: 2025-05-09 | End: 2025-05-26

## 2025-05-09 RX ADMIN — CEFTRIAXONE 2000 MILLIGRAM(S): 500 INJECTION, POWDER, FOR SOLUTION INTRAMUSCULAR; INTRAVENOUS at 22:02

## 2025-05-09 RX ADMIN — FUROSEMIDE 40 MILLIGRAM(S): 10 INJECTION INTRAMUSCULAR; INTRAVENOUS at 22:05

## 2025-05-09 RX ADMIN — CEFTRIAXONE 100 MILLIGRAM(S): 500 INJECTION, POWDER, FOR SOLUTION INTRAMUSCULAR; INTRAVENOUS at 20:31

## 2025-05-09 NOTE — H&P ADULT - PROBLEM SELECTOR PLAN 2
WBC WNL    neutrophilic shift  T afebrile, reports subjective fever and chills x 1  week   CXR as above   RVP neg    s/p CTX in ED  - cw CTX, AZT  -f/u strep pneumo, legionella, mycoplasma  -f/u cx  -f/u ESR,CRP, procal

## 2025-05-09 NOTE — H&P ADULT - NSHPREVIEWOFSYSTEMS_GEN_ALL_CORE
- CONSTITUTIONAL: +  fever and chills  - HEENT: Denies changes in vision and hearing.  - RESPIRATORY: +  SOB on exertion , dry cough   - CV: Denies chest pain and palpitations  - GI: Denies abdominal pain, nausea, vomiting and diarrhea.  - : Denies dysuria and urinary frequency.  - SKIN: Denies rash and pruritus. + chronic LLE edema   - NEUROLOGICAL: Denies headache and syncope.  - PSYCHIATRIC: Denies recent changes in mood. Denies anxiety and depression.

## 2025-05-09 NOTE — ED PROVIDER NOTE - CARE PLAN
Principal Discharge DX:	Acute hypoxic respiratory failure  Secondary Diagnosis:	CHF exacerbation   1

## 2025-05-09 NOTE — H&P ADULT - PROBLEM SELECTOR PLAN 7
- Maintain active T&S, 2 large bore peripheral IVs, transfuse for goal hgb >7 or if symptomatic VSS   no active external bleed    remote Hx of UGIB in the past   - Maintain active T&S, 2 large bore peripheral IVs, transfuse for goal hgb >7 or if symptomatic  hold eliquis and f/u rep CBC for Hb VSS   no active external bleed   remote Hx of UGIB in the past   - Maintain active T&S, 2 large bore peripheral IVs, transfuse for goal hgb >7 or if symptomatic  hold eliquis and f/u rep CBC for Hb

## 2025-05-09 NOTE — H&P ADULT - REASON FOR ADMISSION
1. Have you been to the ER, urgent care clinic since your last visit? Hospitalized since your last visit? No    2. Have you seen or consulted any other health care providers outside of the [address] [place] [address] since your last visit? Include any pap smears or colon screening.  See's  every 3-4 months AHRF 2/2 CHF vs PNA

## 2025-05-09 NOTE — ED PROVIDER NOTE - CRITICAL CARE ATTENDING CONTRIBUTION TO CARE
Upon my evaluation, this patient had a high probability of imminent or life-threatening deterioration due to acute hypoxic respiratory failure, which required my direct attention, intervention, and personal management.    I have personally provided 35 minutes of critical care time exclusive of time spent on separately billable procedures. Time includes review of laboratory data, radiology results, discussion with consultants, and monitoring for potential decompensation. Interventions were performed as documented above.

## 2025-05-09 NOTE — H&P ADULT - NSICDXPASTSURGICALHX_GEN_ALL_CORE_FT
PAST SURGICAL HISTORY:  Cardiac pacemaker     S/P cataract surgery     S/P colon resection 1993 for colon ca.    S/P hysterectomy     S/P mastectomy 1990    S/P peripheral artery bypass in 1994

## 2025-05-09 NOTE — ED ADULT TRIAGE NOTE - CHIEF COMPLAINT QUOTE
worsening shortness of breath x2 weeks, bilateral leg edema, hx of CHF, s/p pace marker replacement March 27

## 2025-05-09 NOTE — H&P ADULT - ATTENDING COMMENTS
Vital Signs Last 24 Hrs  T(C): 36.5 (10 May 2025 01:28), Max: 36.5 (10 May 2025 01:28)  T(F): 97.7 (10 May 2025 01:28), Max: 97.7 (10 May 2025 01:28)  HR: 105 (10 May 2025 01:28) (102 - 105)  BP: 147/85 (10 May 2025 01:28) (147/85 - 153/79)  RR: 20 (10 May 2025 01:28) (20 - 20)  SpO2: 100% (10 May 2025 01:28) (92% - 100%)  Parameters below as of 10 May 2025 01:28  Patient On (Oxygen Delivery Method): nasal cannula  O2 Flow (L/min): 3    Labs reviewed  hgb 8 from 11.4 ( weeks ago)  INR 1.46    lactate 3.3 -->  1.3  Trop -ve X 2  BUN 88  Cr 1.66  pro BNP - 2321     Small moderate right pleural effusion and very small left pleural   effusion, new  There is extensive calcified atherosclerosis of the thoracic aorta.   HEART: Cardiomegaly. Extensive coronary artery calcification is are   present.   There is a 1.5 cm left low-attenuation thyroid nodule versus cyst (5-24). A right-sided single lead pacemaker is present with tip in the right ventricular apex.  BONES: The bones are diffusely osteopenic and there are multilevel   degenerative changes of the spine,    Unchanged 1.2 x 2.1 cm right adrenal nodule, likely a benign adenoma. There is also chronic mild thickening of the left adrenal gland.   There are several partially visualized right probable   renal cysts as well as a 1.2 cm high attenuation structure at the upper   pole (5-154), previously measuring 1.0 cm on 5/15/2018 and possibly   represent an a hyperdense cyst or indolent/very slow growing solid lesion   of doubtful significance in this patient.    Impression   89 year old woman from home with extensive medical hx including HTN, HLD , A-fib on OAC, hx of recent PPM procedure, chronic LLE edema here with progressive SOB, dry cough with congestion in the last week. Vital Signs Last 24 Hrs  T(C): 36.5 (10 May 2025 01:28), Max: 36.5 (10 May 2025 01:28)  T(F): 97.7 (10 May 2025 01:28), Max: 97.7 (10 May 2025 01:28)  HR: 105 (10 May 2025 01:28) (102 - 105)  BP: 147/85 (10 May 2025 01:28) (147/85 - 153/79)  RR: 20 (10 May 2025 01:28) (20 - 20)  SpO2: 100% (10 May 2025 01:28) (92% - 100%)  Parameters below as of 10 May 2025 01:28  Patient On (Oxygen Delivery Method): nasal cannula  O2 Flow (L/min): 3    Labs reviewed  hgb 8 from 11.4 ( weeks ago)  INR 1.46    lactate 3.3 -->  1.3  Trop -ve X 2  BUN 88  Cr 1.66  pro BNP - 2321     - Small moderate right pleural effusion and very small left pleural effusion, new  There is extensive calcified atherosclerosis of the thoracic aorta.   HEART: Cardiomegaly. Extensive coronary artery calcification is are present.     BONES: The bones are diffusely osteopenic and there are multilevel degenerative changes of the spine,    Unchanged 1.2 x 2.1 cm right adrenal nodule, likely a benign adenoma. There is also chronic mild thickening of the left adrenal gland.   There are several partially visualized right probable   renal cysts as well as a 1.2 cm high attenuation structure at the upper   pole (5-154), previously measuring 1.0 cm on 5/15/2018 and possibly   represent an a hyperdense cyst or indolent/very slow growing solid lesion   of doubtful significance in this patient.  There is a 1.5 cm left low-attenuation thyroid nodule versus cyst (5-24). A right-sided single lead pacemaker is present with tip in the right ventricular apex.    Impression   89 year old woman from home with extensive medical hx including HTN, HLD , A-fib on OAC, hx of recent PPM procedure, chronic LLE edema here with progressive SOB, dry cough with congestion in the last week. Clinical exam with bibasal lung crackles and  Imaging studies show B/L pleural effusion but no pulmonary vascular congestion.  Concern for acute right sided heart failure likely worsened by mitral valve regurgitation with cardiorenal failure.  There is also concern for high output cardiac failure from anemia with recent drop from baseline hgb   Will repeat hgb and obtain anemia work up   Evidence of coronary calcification and aortic atherosclerosis  Risk stratification to evaluate for ischemic cardiomyopathy       Problems  Acute resp failure with hypoxia  CHF with exacerbation   --> ? right heart failure   --> high output failure from anemia  r/o CAD --> ischemic CM  BATSHEVA on CKD ==> prerenal from dehydration VS CardioRenal syndrome   --> AFib   --> HTN   --> HLD    Plan   Plan   Admit to telemetry   Serial troponin; EKG  ECHO in AM   NST vs coronary angiogram  Anemia work up ??  Hold OAC; mgt of AF     Cardiology consult   Goals of care consult and code status discussion Vital Signs Last 24 Hrs  T(C): 36.5 (10 May 2025 01:28), Max: 36.5 (10 May 2025 01:28)  T(F): 97.7 (10 May 2025 01:28), Max: 97.7 (10 May 2025 01:28)  HR: 105 (10 May 2025 01:28) (102 - 105)  BP: 147/85 (10 May 2025 01:28) (147/85 - 153/79)  RR: 20 (10 May 2025 01:28) (20 - 20)  SpO2: 100% (10 May 2025 01:28) (92% - 100%)  Parameters below as of 10 May 2025 01:28  Patient On (Oxygen Delivery Method): nasal cannula  O2 Flow (L/min): 3    Labs reviewed  hgb 8 from 11.4 ( weeks ago)  INR 1.46    lactate 3.3 -->  1.3  Trop -ve X 2  BUN 88  Cr 1.66  pro BNP - 2321     - Small moderate right pleural effusion and very small left pleural effusion, new  There is extensive calcified atherosclerosis of the thoracic aorta.   HEART: Cardiomegaly. Extensive coronary artery calcification is are present.     BONES: The bones are diffusely osteopenic and there are multilevel degenerative changes of the spine,    Unchanged 1.2 x 2.1 cm right adrenal nodule, likely a benign adenoma. There is also chronic mild thickening of the left adrenal gland.   There are several partially visualized right probable   renal cysts as well as a 1.2 cm high attenuation structure at the upper   pole (5-154), previously measuring 1.0 cm on 5/15/2018 and possibly   represent an a hyperdense cyst or indolent/very slow growing solid lesion   of doubtful significance in this patient.  There is a 1.5 cm left low-attenuation thyroid nodule versus cyst (5-24). A right-sided single lead pacemaker is present with tip in the right ventricular apex.    Impression   89 year old woman from home with extensive medical hx including HTN, HLD , A-fib on OAC, hx of recent PPM procedure, chronic LLE edema here with progressive SOB, dry cough with congestion in the last week. Clinical exam with bibasal lung crackles and  Imaging studies show B/L pleural effusion but no pulmonary vascular congestion.  Concern for acute right sided heart failure likely worsened by mitral valve regurgitation with cardiorenal failure.  There is also concern for high output cardiac failure from anemia with recent drop from baseline hgb   Will repeat hgb and obtain anemia work up   Evidence of coronary calcification and aortic atherosclerosis  Risk stratification to evaluate for ischemic cardiomyopathy       Problems  Acute resp failure with hypoxia  CHF with exacerbation   --> ? right heart failure   --> high output failure from anemia  r/o CAD --> ischemic CM  BATSHEVA on CKD ==> prerenal from dehydration VS CardioRenal syndrome   Sepsis criteria - concern for PNA --> empiric treatment for CAP    --> AFib   --> HTN   --> HLD    Plan   Plan   Admit to telemetry   Serial troponin; EKG  ECHO in AM - check EF , check pacer wires   NST vs coronary angiogram  Anemia work up ??  Hold OAC; mgt of AF     Cardiology consult   Goals of care consult and code status discussion

## 2025-05-09 NOTE — H&P ADULT - HISTORY OF PRESENT ILLNESS
Left voice message for Mckayla Jackson who pt identified as HCP with call back number   89/ F from home, [place], ambulates with a cane/ walker at baseline, AOx 3 with PMHx  HTN, HLD, afib on eliquis , recent PPM placement 3/25 presented to ER with c/o SOB on exertion, subjective fever, chills , non productive cough, chest congestion x 1 week , worsening over past 2 days. Denies chest pain, recent sick contacts, recent travel. Denies nausea, vomiting , constipation , diarrhea.     Of note : as per chart review and ER sign out - Had an echocardiogram outpatient that showed high pulmonary pressures and mitral valve regurgitation with left atrial enlargement.  Also noted possible clot around the pacemaker wire.      In  the ER   VS - / 79, RR20, , afebrile, sats 92% on 2  L NC     EKG - afib not in RVR    Labs s/o Hb 8 ( from 11.4), neutrophilic shift , BUN/ Cr 88/ 1.66 from ( 28/1.26) , lact 3.3, p BNP 2.3 K     CTA PE   1. No evidence of pulmonary embolism.  2. New small to moderate right pleural effusion and small left pleural   effusion with adjacent airspace opacity, favored to represent   atelectasis. Infection is considered less likely, though is possible in   the appropriate clinical setting.  3. Additional chronic and incidental findings are present as detailed   above.    CXR  Cardiomegaly.  Peribronchial haziness in the right lower lobe with small right pleural   effusion, concerning for pneumonia.    S/p lasix 40 , CTX 2 g

## 2025-05-09 NOTE — ED PROVIDER NOTE - PHYSICAL EXAMINATION
General: non-toxic, NAD  HEENT: NCAT, PERRL, no conjunctival pallor   Cardiac: RRR, no murmurs, 2+ peripheral pulses  Resp: CTAB, tachypneic  Abdomen: soft, non-distended, bowel sounds present, no ttp, no rebound or guarding. no organomegaly  Extremities: bilateral trace peripheral edema, no calf tenderness, or leg size discrepancies  Skin: no rashes  Neuro: AAOx4, 5+motor, sensation grossly intact CN 2-12 intact  Psych: mood and affect appropriate

## 2025-05-09 NOTE — H&P ADULT - NSHPPHYSICALEXAM_GEN_ALL_CORE
PHYSICAL EXAM:  GENERAL: NAD, [place]  speaks in full sentences, no signs of respiratory distress  HEAD:  Atraumatic, Normocephalic  EYES: EOMI, PERRLA, conjunctiva and sclera clear  NECK: Supple, No JVD  CHEST/LUNG: Decreased BS BL lung bases , mild crackles BL bases ; No wheeze; No crackles; No accessory muscles used  HEART: Regular rate and rhythm; No murmurs;   ABDOMEN: Soft, Nontender, Nondistended; Bowel sounds present; No guarding  EXTREMITIES:  2+ Peripheral Pulses, No cyanosis LLE+ chronic edema and chronic skin changes   PSYCH: AAOx3  NEUROLOGY: non-focal  SKIN: No rashes or lesions

## 2025-05-09 NOTE — H&P ADULT - ASSESSMENT
Left voice message for Mckayla Jackson who pt identified as HCP with call back number   89-year-old female history of hypertension, hyperlipidemia, A-fib on Eliquis, recent pacemaker placement on March 27 of this year presents with 2 weeks of shortness of breath worsening over the last few days without any associated fever, chills, chest pain, pleurisy.  No nausea, vomiting, diaphoresis.  No recent illness.  Had an echocardiogram outpatient that showed high pulmonary pressures and mitral valve regurgitation with left atrial enlargement.  Also noted possible clot around the pacemaker wire.   on arrival low-grade tachycardia, hypoxic requiring 2 L of nasal cannula oxygen, clear lungs, benign abdomen, trace bilateral pitting lower extremity edema, no pallor.   EKG with atrial fibrillation    In  the ER   VS - / 79, RR20, , afebrile, sats 92% on 2  L NC     EKG - afib not in RVR    Labs s/o Hb 8 ( from 11.4), neutrophilic shift , BUN/ Cr 88/ 1.66 from ( 28/1.26) , lact 3.3, p BNP 2.3 K     CTA PE   1. No evidence of pulmonary embolism.  2. New small to moderate right pleural effusion and small left pleural   effusion with adjacent airspace opacity, favored to represent   atelectasis. Infection is considered less likely, though is possible in   the appropriate clinical setting.  3. Additional chronic and incidental findings are present as detailed   above.    CXR  Cardiomegaly.  Peribronchial haziness in the right lower lobe with small right pleural   effusion, concerning for pneumonia.    S/p lasix 40 , CTX 2 g     Admitted to medicine for AHRF 2/2 CHF vs PNA     PRIMARY TEAM KINDLY CONFIRM MEDS IN A.M.   Patient does not remember, and we are unable to reach pharmacy. Current list is entered from [employer].    89/ F from home, [employer] [place], ambulates with a cane/ walker at baseline, AOx 3 with PMHx  HTN, HLD, chronic LLE edema , afib on eliquis , recent PPM placement 3/25 presented to ER with c/o SOB on exertion, subjective fever, chills , non productive cough, chest congestion x 1 week. Labs s/o Hb 8 ( from 11.4), neutrophilic shift , BUN/ Cr 88/ 1.66 from ( 28/1.26) , lact 3.3, p BNP 2.3 K . Imaging c/w BL pleural effusions ? R sided infiltrate . S/p lasix 40 , CTX 2 g .     Admitted to medicine for AHRF 2/2 CHF vs PNA     PRIMARY TEAM KINDLY CONFIRM MEDS IN A.M.   Patient does not remember, and we are unable to reach pharmacy. Current list is entered from [facility].

## 2025-05-09 NOTE — ED PROVIDER NOTE - CLINICAL SUMMARY MEDICAL DECISION MAKING FREE TEXT BOX
89-year-old female history of hypertension, hyperlipidemia, A-fib on Eliquis, recent pacemaker placement on March 27 of this year presents with 2 weeks of shortness of breath worsening over the last few days without any associated fever, chills, chest pain, pleurisy.  No nausea, vomiting, diaphoresis.  No recent illness.  Had an echocardiogram outpatient that showed high pulmonary pressures and mitral valve regurgitation with left atrial enlargement.  Also noted possible clot around the pacemaker wire.   on arrival low-grade tachycardia, hypoxic requiring 2 L of nasal cannula oxygen, clear lungs, benign abdomen, trace bilateral pitting lower extremity edema, no pallor.   EKG with atrial fibrillation. Eval PE, ACS, CHF exacerbation admit for hypoxic respiratory failure.

## 2025-05-09 NOTE — ED ADULT NURSE NOTE - OBJECTIVE STATEMENT
axox3 ,nad , sob and general weakness x 1 week , pt lives alone at home . axox3 ,nad , sob and general weakness x 1 week , pt lives alone at home .left Mastectomy

## 2025-05-09 NOTE — ED PROVIDER NOTE - NS ED ROS FT
Constitutional: no fevers, chills  HEENT: no HA, vision changes, rhinorrhea, sore throat  Cardiac: no chest pain, palpitations  Respiratory: +SOB, no cough or hemoptysis  GI: no n/v/d/c, abd pain, bloody or dark stools  : no dysuria, frequency, or hematuria  MSK: no joint pain, neck pain or back pain  Skin: no rashes, jaundice, pruritis  Neuro: no numbness/tingling, weakness, unsteady gait  ROS otherwise neg except per MDM

## 2025-05-09 NOTE — H&P ADULT - PROBLEM SELECTOR PLAN 1
p/w sob x 1 week   CXR -New small to moderate right pleural effusion and small left pleural effusion with adjacent airspace opacity  BNP 2.3 K     takes metoprolol, lasix, losartan as per superscripts   s/p lasix 40 in ER   -c/w toprol with holding parameters   -Start IV lasix  -f/u echo  -Remote tele  -Troponin neg x 1   -daily weights, strict I&O  -Cardio Consult in AM p/w sob x 1 week   CXR -New small to moderate right pleural effusion and small left pleural effusion with adjacent airspace opacity  BNP 2.3 K   Appears mildly hypervolemic on lung exam, also appears dehydrated ( dry mucus membranes )     takes metoprolol, lasix, losartan as per superscripts   s/p lasix 40 in ER   -c/w toprol with holding parameters   -Start IV lasix  -Remote tele  -Troponin neg x 1   -daily weights, strict I&O  [ ] obtain OP TTE results   -Cardio Consult in AM

## 2025-05-09 NOTE — ED PROVIDER NOTE - PROGRESS NOTE DETAILS
Deidre (Johan) Morelia, DO pt stable, trops improving. no PE on CTA. endorsed to hospitalist and MAR. pt and family aware.

## 2025-05-09 NOTE — H&P ADULT - PROBLEM SELECTOR PLAN 3
Known hx of afib  Not in RVR  Pacemaker placed 3/2025     -cont home  toprol   -cont eliquis Known hx of afib  Not in RVR  Pacemaker placed 3/2025     -cont home  toprol   -hold eliquis ivo Hb drop  resume if Hb stable Known hx of afib  Not in RVR  Pacemaker placed 3/2025   As per ER sign out   recent TTE OP- echocardiogram outpatient that showed high pulmonary pressures and mitral valve regurgitation with left atrial enlargement.  Also noted possible clot around the pacemaker wire.     -cont home  toprol   -hold eliquis ivo Hb drop  resume if Hb stable    [ ] obtain OP records in AM

## 2025-05-09 NOTE — H&P ADULT - PROBLEM SELECTOR PLAN 6
Pt w/ SCr BUN/ Cr 88/ 1.66 from ( 28/1.26)   likely prerenal   Follow BMP daily  - Keep patient euvolemic on Renal diet   - Avoid Nephrotoxic Meds/ Agents such as NSAIDs, IV contrast, Aminoglycosides such as gentamicin, Gadolinium contrast, Phosphate containing enemas among others  - Adjust Medications according to eGFR  - Monitor BMP daily, replace electrolytes as needed.

## 2025-05-09 NOTE — H&P ADULT - NSICDXPASTMEDICALHX_GEN_ALL_CORE_FT
PAST MEDICAL HISTORY:  Atrial fibrillation     Breast cancer     Cardiac pacemaker     Colon cancer     HLD (hyperlipidemia)     HTN (hypertension)     Pacemaker     Renal insufficiency

## 2025-05-09 NOTE — ED ADULT NURSE NOTE - FINAL NURSING ELECTRONIC SIGNATURE
[Fall prevention counseling provided] : Fall prevention counseling provided [Adequate lighting] : Adequate lighting [No throw rugs] : No throw rugs [Use proper foot wear] : Use proper foot wear [Use recommended devices] : Use recommended devices [Sleep ___ hours/day] : Sleep [unfilled] hours/day [Engage in a relaxing activity] : Engage in a relaxing activity [Plan in advance] : Plan in advance [Potential consequences of obesity discussed] : Potential consequences of obesity discussed [Benefits of weight loss discussed] : Benefits of weight loss discussed [Encouraged to maintain food diary] : Encouraged to maintain food diary [Structured Weight Management Program suggested:] : Structured weight management program suggested [Encouraged to use exercise tracking device] : Encouraged to use exercise tracking device [Encouraged to increase physical activity] : Encouraged to increase physical activity [Target Wt Loss Goal ___] : Weight Loss Goals: Target weight loss goal [unfilled] lbs [Weigh Self Weekly] : weigh self weekly [____ min/wk Activity] : [unfilled] min/wk activity [Decrease Portions] : decrease portions [Keep Food Diary] : keep food diary [FreeTextEntry1] : low fat  low rosie diet  [Good understanding] : Patient has a good understanding of disease, goals and obesity follow-up plan [FreeTextEntry2] : ideal  weight  123.   09-May-2025 17:01 10-May-2025 01:46

## 2025-05-10 ENCOUNTER — RESULT REVIEW (OUTPATIENT)
Age: 89
End: 2025-05-10

## 2025-05-10 LAB
A1C WITH ESTIMATED AVERAGE GLUCOSE RESULT: 5.7 % — HIGH (ref 4–5.6)
ALBUMIN SERPL ELPH-MCNC: 2.5 G/DL — LOW (ref 3.5–5)
ALP SERPL-CCNC: 48 U/L — SIGNIFICANT CHANGE UP (ref 40–120)
ALT FLD-CCNC: 19 U/L DA — SIGNIFICANT CHANGE UP (ref 10–60)
ANION GAP SERPL CALC-SCNC: 9 MMOL/L — SIGNIFICANT CHANGE UP (ref 5–17)
ANISOCYTOSIS BLD QL: SLIGHT — SIGNIFICANT CHANGE UP
APPEARANCE UR: CLEAR — SIGNIFICANT CHANGE UP
AST SERPL-CCNC: 14 U/L — SIGNIFICANT CHANGE UP (ref 10–40)
BASOPHILS # BLD AUTO: 0.08 K/UL — SIGNIFICANT CHANGE UP (ref 0–0.2)
BASOPHILS NFR BLD AUTO: 0.9 % — SIGNIFICANT CHANGE UP (ref 0–2)
BILIRUB SERPL-MCNC: 0.5 MG/DL — SIGNIFICANT CHANGE UP (ref 0.2–1.2)
BILIRUB UR-MCNC: NEGATIVE — SIGNIFICANT CHANGE UP
BLD GP AB SCN SERPL QL: SIGNIFICANT CHANGE UP
BUN SERPL-MCNC: 97 MG/DL — HIGH (ref 7–18)
BURR CELLS BLD QL SMEAR: PRESENT — SIGNIFICANT CHANGE UP
CALCIUM SERPL-MCNC: 8.9 MG/DL — SIGNIFICANT CHANGE UP (ref 8.4–10.5)
CHLORIDE SERPL-SCNC: 114 MMOL/L — HIGH (ref 96–108)
CHOLEST SERPL-MCNC: 73 MG/DL — SIGNIFICANT CHANGE UP
CO2 SERPL-SCNC: 21 MMOL/L — LOW (ref 22–31)
COLOR SPEC: YELLOW — SIGNIFICANT CHANGE UP
CREAT ?TM UR-MCNC: 18 MG/DL — SIGNIFICANT CHANGE UP
CREAT SERPL-MCNC: 1.53 MG/DL — HIGH (ref 0.5–1.3)
CRP SERPL-MCNC: 6.2 MG/L — HIGH (ref 0–5)
DIFF PNL FLD: NEGATIVE — SIGNIFICANT CHANGE UP
EGFR: 31 ML/MIN/1.73M2 — LOW
EGFR: 31 ML/MIN/1.73M2 — LOW
EOSINOPHIL # BLD AUTO: 0.22 K/UL — SIGNIFICANT CHANGE UP (ref 0–0.5)
EOSINOPHIL NFR BLD AUTO: 2.5 % — SIGNIFICANT CHANGE UP (ref 0–6)
ERYTHROCYTE [SEDIMENTATION RATE] IN BLOOD: 14 MM/HR — SIGNIFICANT CHANGE UP (ref 0–20)
ESTIMATED AVERAGE GLUCOSE: 117 MG/DL — HIGH (ref 68–114)
GLUCOSE SERPL-MCNC: 154 MG/DL — HIGH (ref 70–99)
GLUCOSE UR QL: NEGATIVE MG/DL — SIGNIFICANT CHANGE UP
HCT VFR BLD CALC: 19.4 % — CRITICAL LOW (ref 34.5–45)
HDLC SERPL-MCNC: 31 MG/DL — LOW
HGB BLD-MCNC: 6.3 G/DL — CRITICAL LOW (ref 11.5–15.5)
IMM GRANULOCYTES NFR BLD AUTO: 0.7 % — SIGNIFICANT CHANGE UP (ref 0–0.9)
KETONES UR-MCNC: NEGATIVE MG/DL — SIGNIFICANT CHANGE UP
LACTATE SERPL-SCNC: 1.9 MMOL/L — SIGNIFICANT CHANGE UP (ref 0.7–2)
LDLC SERPL-MCNC: 22 MG/DL — SIGNIFICANT CHANGE UP
LEUKOCYTE ESTERASE UR-ACNC: NEGATIVE — SIGNIFICANT CHANGE UP
LG PLATELETS BLD QL AUTO: SLIGHT — SIGNIFICANT CHANGE UP
LIPID PNL WITH DIRECT LDL SERPL: 22 MG/DL — SIGNIFICANT CHANGE UP
LYMPHOCYTES # BLD AUTO: 1.05 K/UL — SIGNIFICANT CHANGE UP (ref 1–3.3)
LYMPHOCYTES # BLD AUTO: 11.9 % — LOW (ref 13–44)
MAGNESIUM SERPL-MCNC: 2 MG/DL — SIGNIFICANT CHANGE UP (ref 1.6–2.6)
MANUAL SMEAR VERIFICATION: SIGNIFICANT CHANGE UP
MCHC RBC-ENTMCNC: 27 PG — SIGNIFICANT CHANGE UP (ref 27–34)
MCHC RBC-ENTMCNC: 32.5 G/DL — SIGNIFICANT CHANGE UP (ref 32–36)
MCV RBC AUTO: 83.3 FL — SIGNIFICANT CHANGE UP (ref 80–100)
MONOCYTES # BLD AUTO: 0.78 K/UL — SIGNIFICANT CHANGE UP (ref 0–0.9)
MONOCYTES NFR BLD AUTO: 8.8 % — SIGNIFICANT CHANGE UP (ref 2–14)
NEUTROPHILS # BLD AUTO: 6.63 K/UL — SIGNIFICANT CHANGE UP (ref 1.8–7.4)
NEUTROPHILS NFR BLD AUTO: 75.2 % — SIGNIFICANT CHANGE UP (ref 43–77)
NITRITE UR-MCNC: NEGATIVE — SIGNIFICANT CHANGE UP
NONHDLC SERPL-MCNC: 42 MG/DL — SIGNIFICANT CHANGE UP
NRBC BLD AUTO-RTO: 0 /100 WBCS — SIGNIFICANT CHANGE UP (ref 0–0)
OSMOLALITY UR: 444 MOS/KG — SIGNIFICANT CHANGE UP (ref 50–1200)
OVALOCYTES BLD QL SMEAR: SLIGHT — SIGNIFICANT CHANGE UP
PH UR: 5.5 — SIGNIFICANT CHANGE UP (ref 5–8)
PHOSPHATE SERPL-MCNC: 3.8 MG/DL — SIGNIFICANT CHANGE UP (ref 2.5–4.5)
PLAT MORPH BLD: NORMAL — SIGNIFICANT CHANGE UP
PLATELET # BLD AUTO: 203 K/UL — SIGNIFICANT CHANGE UP (ref 150–400)
PLATELET COUNT - ESTIMATE: NORMAL — SIGNIFICANT CHANGE UP
POTASSIUM SERPL-MCNC: 3.6 MMOL/L — SIGNIFICANT CHANGE UP (ref 3.5–5.3)
POTASSIUM SERPL-SCNC: 3.6 MMOL/L — SIGNIFICANT CHANGE UP (ref 3.5–5.3)
POTASSIUM UR-SCNC: 17 MMOL/L — SIGNIFICANT CHANGE UP
PROCALCITONIN SERPL-MCNC: 0.3 NG/ML — HIGH (ref 0.02–0.1)
PROT ?TM UR-MCNC: 5 MG/DL — SIGNIFICANT CHANGE UP (ref 0–12)
PROT SERPL-MCNC: 5.3 G/DL — LOW (ref 6–8.3)
PROT UR-MCNC: NEGATIVE MG/DL — SIGNIFICANT CHANGE UP
PROT/CREAT UR-RTO: 0.3 RATIO — HIGH (ref 0–0.2)
RBC # BLD: 2.33 M/UL — LOW (ref 3.8–5.2)
RBC # FLD: 18 % — HIGH (ref 10.3–14.5)
RBC BLD AUTO: ABNORMAL
SMUDGE CELLS # BLD: PRESENT — SIGNIFICANT CHANGE UP
SODIUM SERPL-SCNC: 144 MMOL/L — SIGNIFICANT CHANGE UP (ref 135–145)
SODIUM UR-SCNC: 109 MMOL/L — SIGNIFICANT CHANGE UP
SP GR SPEC: 1.01 — SIGNIFICANT CHANGE UP (ref 1–1.03)
TRIGL SERPL-MCNC: 107 MG/DL — SIGNIFICANT CHANGE UP
TSH SERPL-MCNC: 1.38 UU/ML — SIGNIFICANT CHANGE UP (ref 0.34–4.82)
UROBILINOGEN FLD QL: 0.2 MG/DL — SIGNIFICANT CHANGE UP (ref 0.2–1)
UUN UR-MCNC: 518 MG/DL — SIGNIFICANT CHANGE UP
WBC # BLD: 8.82 K/UL — SIGNIFICANT CHANGE UP (ref 3.8–10.5)
WBC # FLD AUTO: 8.82 K/UL — SIGNIFICANT CHANGE UP (ref 3.8–10.5)

## 2025-05-10 PROCEDURE — 99223 1ST HOSP IP/OBS HIGH 75: CPT

## 2025-05-10 PROCEDURE — 99233 SBSQ HOSP IP/OBS HIGH 50: CPT | Mod: GC

## 2025-05-10 RX ORDER — AZITHROMYCIN 250 MG
CAPSULE ORAL
Refills: 0 | Status: DISCONTINUED | OUTPATIENT
Start: 2025-05-10 | End: 2025-05-12

## 2025-05-10 RX ORDER — AZITHROMYCIN 250 MG
500 CAPSULE ORAL ONCE
Refills: 0 | Status: COMPLETED | OUTPATIENT
Start: 2025-05-10 | End: 2025-05-10

## 2025-05-10 RX ORDER — CEFTRIAXONE 500 MG/1
1000 INJECTION, POWDER, FOR SOLUTION INTRAMUSCULAR; INTRAVENOUS EVERY 24 HOURS
Refills: 0 | Status: DISCONTINUED | OUTPATIENT
Start: 2025-05-10 | End: 2025-05-12

## 2025-05-10 RX ORDER — AZITHROMYCIN 250 MG
500 CAPSULE ORAL EVERY 24 HOURS
Refills: 0 | Status: DISCONTINUED | OUTPATIENT
Start: 2025-05-11 | End: 2025-05-12

## 2025-05-10 RX ADMIN — CEFTRIAXONE 100 MILLIGRAM(S): 500 INJECTION, POWDER, FOR SOLUTION INTRAMUSCULAR; INTRAVENOUS at 20:55

## 2025-05-10 RX ADMIN — Medication 250 MILLIGRAM(S): at 05:41

## 2025-05-10 RX ADMIN — ATORVASTATIN CALCIUM 40 MILLIGRAM(S): 80 TABLET, FILM COATED ORAL at 22:19

## 2025-05-10 RX ADMIN — METOPROLOL SUCCINATE 25 MILLIGRAM(S): 50 TABLET, EXTENDED RELEASE ORAL at 05:38

## 2025-05-10 NOTE — PROGRESS NOTE ADULT - PROBLEM SELECTOR PLAN 1
p/w sob x 1 week   CXR -New small to moderate right pleural effusion and small left pleural effusion with adjacent airspace opacity  BNP 2.3 K   Appears mildly hypervolemic on lung exam, also appears dehydrated ( dry mucus membranes )     takes metoprolol, lasix, losartan as per superscripts   s/p lasix 40 in ER   -c/w toprol with holding parameters   -Start IV lasix  -Remote tele  -Troponin neg x 1   -daily weights, strict I&O  [ ] obtain OP TTE results   -Cardio Consult in AM p/w sob x 1 week   CXR -New small to moderate right pleural effusion and small left pleural effusion with adjacent airspace opacity  BNP 2.3 K   Appears mildly hypervolemic on lung exam, also appears dehydrated ( dry mucus membranes )     takes metoprolol, lasix, losartan as per superscripts   s/p lasix 40 in ER   -c/w toprol with holding parameters   -Remote tele  -Troponin neg x 1   -daily weights, strict I&O  [ ] obtain OP TTE results   -Cardiology consulted: Dr Lim

## 2025-05-10 NOTE — PROGRESS NOTE ADULT - PROBLEM SELECTOR PLAN 3
Known hx of afib  Not in RVR  Pacemaker placed 3/2025   As per ER sign out   recent TTE OP- echocardiogram outpatient that showed high pulmonary pressures and mitral valve regurgitation with left atrial enlargement.  Also noted possible clot around the pacemaker wire.     -cont home  toprol   -hold eliquis ivo Hb drop  resume if Hb stable    [ ] obtain OP records in AM

## 2025-05-10 NOTE — PROGRESS NOTE ADULT - PROBLEM SELECTOR PLAN 7
VSS   no active external bleed   remote Hx of UGIB in the past   - Maintain active T&S, 2 large bore peripheral IVs, transfuse for goal hgb >7 or if symptomatic  hold eliquis and f/u rep CBC for Hb

## 2025-05-10 NOTE — PATIENT PROFILE ADULT - FALL HARM RISK - RISK INTERVENTIONS

## 2025-05-10 NOTE — GOALS OF CARE CONVERSATION - ADVANCED CARE PLANNING - AGENT'S NAME
Received notification fax from [facility] ([facility] [facility] [address] advising us that [facility] is taking over pharmacy services for their residents.    Current medication list checked and placed in provider's in-basket for review and signature.   Mckayla Nair

## 2025-05-10 NOTE — CONSULT NOTE ADULT - SUBJECTIVE AND OBJECTIVE BOX
CHIEF COMPLAINT:SOB and weakness     HPI:  89/ F from home, [place], ambulates with a cane/ walker at baseline, AOx 3 with PMHx  HTN, HLD, afib on eliquis , recent PPM placement 3/25 presented to ER with c/o SOB on exertion, subjective fever, chills , non productive cough, chest congestion x 1 week , worsening over past 2 days. Denies chest pain, recent sick contacts, recent travel. Denies nausea, vomiting , constipation , diarrhea. ? H/o Clot around PPM on outside TTE   at present feels better   PAST MEDICAL & SURGICAL HISTORY:  Atrial fibrillation      Renal insufficiency      HTN (hypertension)      Breast cancer      Colon cancer      Pacemaker      HLD (hyperlipidemia)      Cardiac pacemaker      S/P peripheral artery bypass  in 1994      S/P mastectomy  1990      S/P colon resection  1993 for colon ca.      S/P hysterectomy      S/P cataract surgery      Cardiac pacemaker          Allergies    No Known Allergies    Intolerances        MEDICATIONS  (STANDING):  atorvastatin 40 milliGRAM(s) Oral at bedtime  azithromycin  IVPB      cefTRIAXone   IVPB 1000 milliGRAM(s) IV Intermittent every 24 hours  metoprolol succinate ER 25 milliGRAM(s) Oral daily    MEDICATIONS  (PRN):  acetaminophen     Tablet .. 650 milliGRAM(s) Oral every 6 hours PRN Temp greater or equal to 38C (100.4F), Mild Pain (1 - 3)  aluminum hydroxide/magnesium hydroxide/simethicone Suspension 30 milliLiter(s) Oral every 4 hours PRN Dyspepsia  benzocaine/menthol Lozenge 1 Lozenge Oral every 6 hours PRN cough  guaiFENesin Oral Liquid (Sugar-Free) 200 milliGRAM(s) Oral every 6 hours PRN Cough  melatonin 3 milliGRAM(s) Oral at bedtime PRN Insomnia  ondansetron Injectable 4 milliGRAM(s) IV Push every 8 hours PRN Nausea and/or Vomiting      FAMILY HISTORY:  FH: smoking  sister      No family history of premature coronary artery disease or sudden cardiac death    SOCIAL HISTORY:  Smoking-  Alcohol-  Illicit Drug use-    REVIEW OF SYSTEMS:  Constitutional: [ ] fever, [ ]weight loss,  [ ]fatigue  Eyes: [ ] visual changes  Respiratory: [ x]shortness of breath;  [ ] cough, [ ]wheezing, [ ]chills, [ ]hemoptysis  Cardiovascular: [ ] chest pain, [x ]palpitations, [ ]dizziness,  [ ]leg swelling [ ]syncope  Gastrointestinal: [ ] abdominal pain, [ ]nausea, [ ]vomiting,  [ ]diarrhea   Genitourinary: [ ] dysuria, [ ] hematuria  Neurologic: [ ] headaches [ ] tremors  [x ] weakness [ ] lightheadedness  Skin: [ ] itching, [ ]burning, [ ] rashes  Endocrine: [ ] heat or cold intolerance  Musculoskeletal: [ ] joint pain or swelling; [ ] muscle, back, or extremity pain  Psychiatric: [ ] depression, [ ]anxiety, [ ]mood swings, or [ ]difficulty sleeping  Hematologic: [ ] easy bruising, [ ] bleeding gums       [ x] All others negative	  [ ] Unable to obtain    Vital Signs Last 24 Hrs  T(C): 36.5 (10 May 2025 11:04), Max: 36.8 (10 May 2025 04:44)  T(F): 97.7 (10 May 2025 11:04), Max: 98.3 (10 May 2025 04:44)  HR: 92 (10 May 2025 11:04) (74 - 107)  BP: 104/69 (10 May 2025 11:04) (101/49 - 153/79)  BP(mean): --  RR: 20 (10 May 2025 11:04) (20 - 20)  SpO2: 97% (10 May 2025 11:04) (92% - 100%)    Parameters below as of 10 May 2025 11:04  Patient On (Oxygen Delivery Method): nasal cannula  O2 Flow (L/min): 2    I&O's Summary    09 May 2025 07:01  -  10 May 2025 07:00  --------------------------------------------------------  IN: 200 mL / OUT: 600 mL / NET: -400 mL        PHYSICAL EXAM:  General: No acute distress  HEENT: EOMI, PERRL  Neck: Supple, No JVD  Lungs: Rhonchi and wheezing   Heart:iirregular rate and rhythm; Harsh systolic rubs, or gallops  Abdomen: Nontender, bowel sounds present  Extremities: No clubbing, cyanosis, or edema  Nervous system:  Alert & Oriented X3, no focal deficits  Psychiatric: Normal affect  Skin: No rashes or lesions      LABS:  05-10    144  |  114[H]  |  97[H]  ----------------------------<  154[H]  3.6   |  21[L]  |  1.53[H]    Ca    8.9      10 May 2025 07:37  Phos  3.8     05-10  Mg     2.0     05-10    TPro  5.3[L]  /  Alb  2.5[L]  /  TBili  0.5  /  DBili  x   /  AST  14  /  ALT  19  /  AlkPhos  48  05-10    Creatinine Trend: 1.53<--, 1.66<--                        6.3    8.82  )-----------( 203      ( 10 May 2025 07:37 )             19.4     PT/INR - ( 09 May 2025 18:25 )   PT: 16.9 sec;   INR: 1.46 ratio         PTT - ( 09 May 2025 18:25 )  PTT:30.1 sec    Lipid Panel: Cholesterol, Serum 73  Direct LDL --  HDL Cholesterol, Serum 31  Triglycerides, Serum 107    Cardiac Enzymes:           RADIOLOGY:  < from: CT Angio Chest PE Protocol w/ IV Cont (05.09.25 @ 19:52) >  IMPRESSION:  1. No evidence of pulmonary embolism.  2. New small to moderate right pleural effusion and small left pleural   effusion with adjacent airspace opacity, favored to represent   atelectasis. Infection is considered less likely, though is possible in   the appropriate clinical setting.  3. Additional chronic and incidental findings are present as detailed   abov    < end of copied text >    ECG [my interpretation]:Atrial fibrillation with MVR to slow with demand pacer with 1:1     TELEMETRY: atrial Fib     ECHO:    STRESS TEST:    CATHETERIZATION:

## 2025-05-10 NOTE — GOALS OF CARE CONVERSATION - ADVANCED CARE PLANNING - CONVERSATION DETAILS
I met with the patient on morning rounds on 5/10.     She is declining blood transfusion for Hgb of 6.3 g/dL.     The patient says she "wants to die" and wants to "go naturally." She was able to say back to me that she is aware she could die if she is briskly bleeding and does not receive a transfusion. She is comfortable with a natural death and does not want to be resuscitated or intubated. She was able to state her full name, where she was, the year and has insight into her current situation.     I called her HCP Mckayla Nair who says the patient "has been wanting to die for years." She also provides verbal consent for DNR/DNI, no other limits on her care at this time with exception of patient's personal preference not to receive PRBC transfusion.

## 2025-05-10 NOTE — PROGRESS NOTE ADULT - SUBJECTIVE AND OBJECTIVE BOX
PGY-1 Progress Note discussed with attending    PAGER #: [[phone]] TILL 5:00 PM  PLEASE CONTACT ON CALL TEAM:  - On Call Team (Please refer to Gypsy) FROM 5:00 PM - 8:30PM  - Nightfloat Team FROM 8:30 -7:30 AM    INTERVAL HPI/OVERNIGHT EVENTS:       REVIEW OF SYSTEMS:  CONSTITUTIONAL: No fever, chills,  or fatigue  RESPIRATORY: No cough, wheezing, No shortness of breath  CARDIOVASCULAR: No chest pain, palpitations,  leg swelling  GASTROINTESTINAL: No abdominal pain. No nausea, vomiting, or hematemesis; No diarrhea or constipation. No bloody or black stool  GENITOURINARY: No dysuria or hematuria, urinary frequency  NEUROLOGICAL: No headaches, dizziness  SKIN: No itching, burning, rashes, or lesions     Vital Signs Last 24 Hrs  T(C): 36.3 (10 May 2025 08:08), Max: 36.8 (10 May 2025 04:44)  T(F): 97.3 (10 May 2025 08:08), Max: 98.3 (10 May 2025 04:44)  HR: 74 (10 May 2025 08:08) (74 - 107)  BP: 101/49 (10 May 2025 08:08) (101/49 - 153/79)  BP(mean): --  RR: 20 (10 May 2025 08:08) (20 - 20)  SpO2: 94% (10 May 2025 08:08) (92% - 100%)    Parameters below as of 10 May 2025 08:08  Patient On (Oxygen Delivery Method): nasal cannula  O2 Flow (L/min): 2      PHYSICAL EXAMINATION:  GENERAL: NAD,   HEAD:  Atraumatic, Normocephalic  EYES:  PERRLA,  conjunctiva and sclera clear  NECK: Supple,    CHEST WALL: Nontender  LUNG: Clear to auscultation. No rales, rhonchi, wheezing, or rubs  HEART: Regular rate and rhythm; No murmurs,  ABDOMEN: Soft, Nontender, Nondistended; Bowel sounds present, No Rovsing's sign   NERVOUS SYSTEM:  Alert & Oriented X3,    EXTREMITIES:  2+ Peripheral Pulses, No clubbing, cyanosis, or edema  CALF: No Calf tenderness   SKIN: warm dry                          6.3    8.82  )-----------( 203      ( 10 May 2025 07:37 )             19.4     05-10    144  |  114[H]  |  97[H]  ----------------------------<  154[H]  3.6   |  21[L]  |  1.53[H]    Ca    8.9      10 May 2025 07:37  Phos  3.8     05-10  Mg     2.0     05-10    TPro  5.3[L]  /  Alb  2.5[L]  /  TBili  0.5  /  DBili  x   /  AST  14  /  ALT  19  /  AlkPhos  48  05-10    LIVER FUNCTIONS - ( 10 May 2025 07:37 )  Alb: 2.5 g/dL / Pro: 5.3 g/dL / ALK PHOS: 48 U/L / ALT: 19 U/L DA / AST: 14 U/L / GGT: x               PT/INR - ( 09 May 2025 18:25 )   PT: 16.9 sec;   INR: 1.46 ratio         PTT - ( 09 May 2025 18:25 )  PTT:30.1 sec    CAPILLARY BLOOD GLUCOSE      RADIOLOGY & ADDITIONAL TESTS:                   PGY-1 Progress Note discussed with attending    PAGER #: [[phone]] TILL 5:00 PM  PLEASE CONTACT ON CALL TEAM:  - On Call Team (Please refer to Gypsy) FROM 5:00 PM - 8:30PM  - Nightfloat Team FROM 8:30 -7:30 AM    INTERVAL HPI/OVERNIGHT EVENTS: No acute events overnight.  Patient examined at bedside this AM.  Patient denies acute complaints.      REVIEW OF SYSTEMS:  CONSTITUTIONAL: No fever, chills,  or fatigue  RESPIRATORY: No cough, wheezing, No shortness of breath  CARDIOVASCULAR: No chest pain, palpitations,  leg swelling  GASTROINTESTINAL: No abdominal pain. No nausea, vomiting, or hematemesis; No diarrhea or constipation. No bloody or black stool  GENITOURINARY: No dysuria or hematuria, urinary frequency  NEUROLOGICAL: No headaches, dizziness  SKIN: No itching, burning, rashes, or lesions     Vital Signs Last 24 Hrs  T(C): 36.3 (10 May 2025 08:08), Max: 36.8 (10 May 2025 04:44)  T(F): 97.3 (10 May 2025 08:08), Max: 98.3 (10 May 2025 04:44)  HR: 74 (10 May 2025 08:08) (74 - 107)  BP: 101/49 (10 May 2025 08:08) (101/49 - 153/79)  BP(mean): --  RR: 20 (10 May 2025 08:08) (20 - 20)  SpO2: 94% (10 May 2025 08:08) (92% - 100%)    Parameters below as of 10 May 2025 08:08  Patient On (Oxygen Delivery Method): nasal cannula  O2 Flow (L/min): 2      PHYSICAL EXAMINATION:  GENERAL: NAD, lying comfortably in bed  HEAD:  Atraumatic, Normocephalic  EYES:  PERRLA,  conjunctiva and sclera clear  NECK: Supple,    CHEST WALL: Nontender  LUNG: BL basilar crackles, R>>L; saturating well on 2L  HEART: Regular rate and rhythm; No murmurs,  ABDOMEN: Soft, Nontender, Nondistended; Bowel sounds present, No Rovsing's sign   NERVOUS SYSTEM:  Alert & Oriented X3  EXTREMITIES: +LLE edema (chronic)  CALF: No Calf tenderness   SKIN: warm dry                          6.3    8.82  )-----------( 203      ( 10 May 2025 07:37 )             19.4     05-10    144  |  114[H]  |  97[H]  ----------------------------<  154[H]  3.6   |  21[L]  |  1.53[H]    Ca    8.9      10 May 2025 07:37  Phos  3.8     05-10  Mg     2.0     05-10    TPro  5.3[L]  /  Alb  2.5[L]  /  TBili  0.5  /  DBili  x   /  AST  14  /  ALT  19  /  AlkPhos  48  05-10    LIVER FUNCTIONS - ( 10 May 2025 07:37 )  Alb: 2.5 g/dL / Pro: 5.3 g/dL / ALK PHOS: 48 U/L / ALT: 19 U/L DA / AST: 14 U/L / GGT: x               PT/INR - ( 09 May 2025 18:25 )   PT: 16.9 sec;   INR: 1.46 ratio         PTT - ( 09 May 2025 18:25 )  PTT:30.1 sec    CAPILLARY BLOOD GLUCOSE      RADIOLOGY & ADDITIONAL TESTS:                   PGY-1 Progress Note discussed with attending    PAGER #: [[phone]] TILL 5:00 PM  PLEASE CONTACT ON CALL TEAM:  - On Call Team (Please refer to Gypsy) FROM 5:00 PM - 8:30PM  - Nightfloat Team FROM 8:30 -7:30 AM    INTERVAL HPI/OVERNIGHT EVENTS: Patient examined at bedside this AM.  Patient denies acute complaints. Hgb dropped to 6.3 on AM labs, however the patient refused blood transfusion this morning. GOC was revised with the patient and the HCP, was made DNR/DNI, refer to GOC note.      REVIEW OF SYSTEMS:  CONSTITUTIONAL: No fever, chills,  or fatigue  RESPIRATORY: No cough, wheezing, No shortness of breath  CARDIOVASCULAR: No chest pain, palpitations,  leg swelling  GASTROINTESTINAL: No abdominal pain. No nausea, vomiting, or hematemesis; No diarrhea or constipation.  GENITOURINARY: No dysuria or hematuria, urinary frequency  NEUROLOGICAL: No headaches, dizziness  SKIN: No itching, burning, rashes, or lesions     Vital Signs Last 24 Hrs  T(C): 36.3 (10 May 2025 08:08), Max: 36.8 (10 May 2025 04:44)  T(F): 97.3 (10 May 2025 08:08), Max: 98.3 (10 May 2025 04:44)  HR: 74 (10 May 2025 08:08) (74 - 107)  BP: 101/49 (10 May 2025 08:08) (101/49 - 153/79)  BP(mean): --  RR: 20 (10 May 2025 08:08) (20 - 20)  SpO2: 94% (10 May 2025 08:08) (92% - 100%)    Parameters below as of 10 May 2025 08:08  Patient On (Oxygen Delivery Method): nasal cannula  O2 Flow (L/min): 2      PHYSICAL EXAMINATION:  GENERAL: NAD, lying comfortably in bed  HEAD:  Atraumatic, Normocephalic  EYES:  PERRLA,  conjunctiva and sclera clear  NECK: Supple,    CHEST WALL: Nontender  LUNG: mild BL basilar crackles, R>L; saturating well on 2L  HEART: Regular rate and rhythm; No murmurs,  ABDOMEN: Soft, Nontender, Nondistended; Bowel sounds present, No Rovsing's sign   NERVOUS SYSTEM:  Alert & Oriented X3  EXTREMITIES: +LLE edema (chronic)  CALF: No Calf tenderness   SKIN: warm dry                          6.3    8.82  )-----------( 203      ( 10 May 2025 07:37 )             19.4     05-10    144  |  114[H]  |  97[H]  ----------------------------<  154[H]  3.6   |  21[L]  |  1.53[H]    Ca    8.9      10 May 2025 07:37  Phos  3.8     05-10  Mg     2.0     05-10    TPro  5.3[L]  /  Alb  2.5[L]  /  TBili  0.5  /  DBili  x   /  AST  14  /  ALT  19  /  AlkPhos  48  05-10    LIVER FUNCTIONS - ( 10 May 2025 07:37 )  Alb: 2.5 g/dL / Pro: 5.3 g/dL / ALK PHOS: 48 U/L / ALT: 19 U/L DA / AST: 14 U/L / GGT: x               PT/INR - ( 09 May 2025 18:25 )   PT: 16.9 sec;   INR: 1.46 ratio         PTT - ( 09 May 2025 18:25 )  PTT:30.1 sec    CAPILLARY BLOOD GLUCOSE      RADIOLOGY & ADDITIONAL TESTS:

## 2025-05-10 NOTE — PROGRESS NOTE ADULT - PROBLEM SELECTOR PLAN 5
- c/w atorvastatin 40 mg as  p er surescripts   - f/u lipid profile  - Dash Diet - c/w atorvastatin 40 mg as  p er surescripts   - Lipid profile wnl  - Dash Diet

## 2025-05-10 NOTE — PROGRESS NOTE ADULT - PROBLEM SELECTOR PLAN 2
WBC WNL    neutrophilic shift  T afebrile, reports subjective fever and chills x 1  week   CXR as above   RVP neg    s/p CTX in ED  - cw CTX, AZT  -f/u strep pneumo, legionella, mycoplasma  -f/u cx  -f/u ESR,CRP, procal WBC WNL    neutrophilic shift  T afebrile, reports subjective fever and chills x 1  week   CXR as above   RVP neg    s/p CTX in ED  ESR WNL; procal 0.3, CRP 6.2  - cw CTX, AZT  -f/u strep pneumo, legionella, mycoplasma  -f/u cx

## 2025-05-10 NOTE — PROGRESS NOTE ADULT - ASSESSMENT
89/ F from home, [place], ambulates with a cane/ walker at baseline, AOx 3 with PMHx  HTN, HLD, chronic LLE edema , afib on eliquis , recent PPM placement 3/25 presented to ER with c/o SOB on exertion, subjective fever, chills , non productive cough, chest congestion x 1 week. Labs s/o Hb 8 ( from 11.4), neutrophilic shift , BUN/ Cr 88/ 1.66 from ( 28/1.26) , lact 3.3, p BNP 2.3 K . Imaging c/w BL pleural effusions ? R sided infiltrate . S/p lasix 40 , CTX 2 g .     Admitted to medicine for AHRF 2/2 CHF vs PNA     PRIMARY TEAM KINDLY CONFIRM MEDS IN A.M.   Patient does not remember, and we are unable to reach pharmacy. Current list is entered from [employer].    89/ F from home, [place], ambulates with a cane/ walker at baseline, AOx 3 with PMHx  HTN, HLD, chronic LLE edema , afib on eliquis , recent PPM placement 3/25 presented to ER with c/o SOB on exertion, subjective fever, chills , non productive cough, chest congestion x 1 week. Labs s/o Hb 8 ( from 11.4), neutrophilic shift , BUN/ Cr 88/ 1.66 from ( 28/1.26) , lact 3.3, p BNP 2.3 K . Imaging c/w BL pleural effusions ? R sided infiltrate . S/p lasix 40 , CTX 2 g .     Admitted to medicine for AHRF 2/2 CHF vs PNA     PRIMARY TEAM KINDLY CONFIRM MEDS IN A.M.   Patient does not remember, and we are unable to reach pharmacy. Current list is entered from [employer].

## 2025-05-11 DIAGNOSIS — I27.20 PULMONARY HYPERTENSION, UNSPECIFIED: ICD-10-CM

## 2025-05-11 LAB
ABO RH CONFIRMATION: SIGNIFICANT CHANGE UP
ALBUMIN SERPL ELPH-MCNC: 2.9 G/DL — LOW (ref 3.5–5)
ALP SERPL-CCNC: 53 U/L — SIGNIFICANT CHANGE UP (ref 40–120)
ALT FLD-CCNC: 22 U/L DA — SIGNIFICANT CHANGE UP (ref 10–60)
ANION GAP SERPL CALC-SCNC: 9 MMOL/L — SIGNIFICANT CHANGE UP (ref 5–17)
ANISOCYTOSIS BLD QL: SLIGHT — SIGNIFICANT CHANGE UP
AST SERPL-CCNC: 16 U/L — SIGNIFICANT CHANGE UP (ref 10–40)
BASOPHILS # BLD AUTO: 0.07 K/UL — SIGNIFICANT CHANGE UP (ref 0–0.2)
BASOPHILS NFR BLD AUTO: 0.9 % — SIGNIFICANT CHANGE UP (ref 0–2)
BILIRUB SERPL-MCNC: 0.4 MG/DL — SIGNIFICANT CHANGE UP (ref 0.2–1.2)
BUN SERPL-MCNC: 81 MG/DL — HIGH (ref 7–18)
BURR CELLS BLD QL SMEAR: SLIGHT — SIGNIFICANT CHANGE UP
CALCIUM SERPL-MCNC: 8.8 MG/DL — SIGNIFICANT CHANGE UP (ref 8.4–10.5)
CHLORIDE SERPL-SCNC: 116 MMOL/L — HIGH (ref 96–108)
CO2 SERPL-SCNC: 20 MMOL/L — LOW (ref 22–31)
CREAT SERPL-MCNC: 1.71 MG/DL — HIGH (ref 0.5–1.3)
EGFR: 27 ML/MIN/1.73M2 — LOW
EGFR: 27 ML/MIN/1.73M2 — LOW
ELLIPTOCYTES BLD QL SMEAR: SLIGHT — SIGNIFICANT CHANGE UP
EOSINOPHIL # BLD AUTO: 0.27 K/UL — SIGNIFICANT CHANGE UP (ref 0–0.5)
EOSINOPHIL NFR BLD AUTO: 3.5 % — SIGNIFICANT CHANGE UP (ref 0–6)
GLUCOSE SERPL-MCNC: 111 MG/DL — HIGH (ref 70–99)
HCT VFR BLD CALC: 19 % — CRITICAL LOW (ref 34.5–45)
HCT VFR BLD CALC: 23.7 % — LOW (ref 34.5–45)
HGB BLD-MCNC: 6.1 G/DL — CRITICAL LOW (ref 11.5–15.5)
HGB BLD-MCNC: 7.9 G/DL — LOW (ref 11.5–15.5)
HYPOCHROMIA BLD QL: SLIGHT — SIGNIFICANT CHANGE UP
IMM GRANULOCYTES NFR BLD AUTO: 0.4 % — SIGNIFICANT CHANGE UP (ref 0–0.9)
LG PLATELETS BLD QL AUTO: SLIGHT — SIGNIFICANT CHANGE UP
LYMPHOCYTES # BLD AUTO: 0.97 K/UL — LOW (ref 1–3.3)
LYMPHOCYTES # BLD AUTO: 12.5 % — LOW (ref 13–44)
MACROCYTES BLD QL: SLIGHT — SIGNIFICANT CHANGE UP
MAGNESIUM SERPL-MCNC: 2.1 MG/DL — SIGNIFICANT CHANGE UP (ref 1.6–2.6)
MANUAL SMEAR VERIFICATION: SIGNIFICANT CHANGE UP
MCHC RBC-ENTMCNC: 27.7 PG — SIGNIFICANT CHANGE UP (ref 27–34)
MCHC RBC-ENTMCNC: 28 PG — SIGNIFICANT CHANGE UP (ref 27–34)
MCHC RBC-ENTMCNC: 32.1 G/DL — SIGNIFICANT CHANGE UP (ref 32–36)
MCHC RBC-ENTMCNC: 33.3 G/DL — SIGNIFICANT CHANGE UP (ref 32–36)
MCV RBC AUTO: 84 FL — SIGNIFICANT CHANGE UP (ref 80–100)
MCV RBC AUTO: 86.4 FL — SIGNIFICANT CHANGE UP (ref 80–100)
MICROCYTES BLD QL: SLIGHT — SIGNIFICANT CHANGE UP
MONOCYTES # BLD AUTO: 0.77 K/UL — SIGNIFICANT CHANGE UP (ref 0–0.9)
MONOCYTES NFR BLD AUTO: 9.9 % — SIGNIFICANT CHANGE UP (ref 2–14)
NEUTROPHILS # BLD AUTO: 5.63 K/UL — SIGNIFICANT CHANGE UP (ref 1.8–7.4)
NEUTROPHILS NFR BLD AUTO: 72.8 % — SIGNIFICANT CHANGE UP (ref 43–77)
NRBC BLD AUTO-RTO: 0 /100 WBCS — SIGNIFICANT CHANGE UP (ref 0–0)
NRBC BLD AUTO-RTO: 0 /100 WBCS — SIGNIFICANT CHANGE UP (ref 0–0)
OVALOCYTES BLD QL SMEAR: SLIGHT — SIGNIFICANT CHANGE UP
PHOSPHATE SERPL-MCNC: 4.5 MG/DL — SIGNIFICANT CHANGE UP (ref 2.5–4.5)
PLAT MORPH BLD: NORMAL — SIGNIFICANT CHANGE UP
PLATELET # BLD AUTO: 165 K/UL — SIGNIFICANT CHANGE UP (ref 150–400)
PLATELET # BLD AUTO: 183 K/UL — SIGNIFICANT CHANGE UP (ref 150–400)
PLATELET COUNT - ESTIMATE: NORMAL — SIGNIFICANT CHANGE UP
POIKILOCYTOSIS BLD QL AUTO: SLIGHT — SIGNIFICANT CHANGE UP
POLYCHROMASIA BLD QL SMEAR: SLIGHT — SIGNIFICANT CHANGE UP
POTASSIUM SERPL-MCNC: 3.7 MMOL/L — SIGNIFICANT CHANGE UP (ref 3.5–5.3)
POTASSIUM SERPL-SCNC: 3.7 MMOL/L — SIGNIFICANT CHANGE UP (ref 3.5–5.3)
PROT SERPL-MCNC: 5.8 G/DL — LOW (ref 6–8.3)
RBC # BLD: 2.2 M/UL — LOW (ref 3.8–5.2)
RBC # BLD: 2.82 M/UL — LOW (ref 3.8–5.2)
RBC # FLD: 17.2 % — HIGH (ref 10.3–14.5)
RBC # FLD: 18.4 % — HIGH (ref 10.3–14.5)
RBC BLD AUTO: ABNORMAL
SODIUM SERPL-SCNC: 145 MMOL/L — SIGNIFICANT CHANGE UP (ref 135–145)
WBC # BLD: 7.77 K/UL — SIGNIFICANT CHANGE UP (ref 3.8–10.5)
WBC # BLD: 7.79 K/UL — SIGNIFICANT CHANGE UP (ref 3.8–10.5)
WBC # FLD AUTO: 7.77 K/UL — SIGNIFICANT CHANGE UP (ref 3.8–10.5)
WBC # FLD AUTO: 7.79 K/UL — SIGNIFICANT CHANGE UP (ref 3.8–10.5)

## 2025-05-11 PROCEDURE — 74176 CT ABD & PELVIS W/O CONTRAST: CPT | Mod: 26

## 2025-05-11 PROCEDURE — 99232 SBSQ HOSP IP/OBS MODERATE 35: CPT

## 2025-05-11 PROCEDURE — 99232 SBSQ HOSP IP/OBS MODERATE 35: CPT | Mod: GC

## 2025-05-11 RX ORDER — FUROSEMIDE 10 MG/ML
40 INJECTION INTRAMUSCULAR; INTRAVENOUS ONCE
Refills: 0 | Status: COMPLETED | OUTPATIENT
Start: 2025-05-11 | End: 2025-05-12

## 2025-05-11 RX ADMIN — ATORVASTATIN CALCIUM 40 MILLIGRAM(S): 80 TABLET, FILM COATED ORAL at 22:34

## 2025-05-11 RX ADMIN — CEFTRIAXONE 100 MILLIGRAM(S): 500 INJECTION, POWDER, FOR SOLUTION INTRAMUSCULAR; INTRAVENOUS at 22:37

## 2025-05-11 RX ADMIN — Medication 250 MILLIGRAM(S): at 00:37

## 2025-05-11 RX ADMIN — Medication 250 MILLIGRAM(S): at 23:39

## 2025-05-11 RX ADMIN — METOPROLOL SUCCINATE 25 MILLIGRAM(S): 50 TABLET, EXTENDED RELEASE ORAL at 05:24

## 2025-05-11 NOTE — PROGRESS NOTE ADULT - PROBLEM SELECTOR PLAN 7
VSS   no active external bleed   remote Hx of UGIB in the past   - Maintain active T&S, 2 large bore peripheral IVs, transfuse for goal hgb >7 or if symptomatic  hold eliquis  - s/p 1 u pRBC on 5/11 - f/u post-transfusion cbc

## 2025-05-11 NOTE — PROGRESS NOTE ADULT - SUBJECTIVE AND OBJECTIVE BOX
CHIEF COMPLAINT:Patient is a 89y old  Female who presents with a chief complaint of AHRF 2/2 CHF vs PNA (11 May 2025 10:45)  89/ F from home, [place], ambulates with a cane/ walker at baseline, AOx 3 with PMHx  HTN, HLD, afib on eliquis , recent PPM placement 3/25 presented to ER with c/o SOB on exertion, subjective fever, chills , non productive cough, chest congestion x 1 week , worsening over past 2 days. Denies chest pain, recent sick contacts, recent travel. Denies nausea, vomiting , constipation , diarrhea. ? H/o Clot around PPM on outside TTE   at present feels better but tired  	  REVIEW OF SYSTEMS:  CONSTITUTIONAL: No fever, weight loss, or fatigue  EYES: No eye pain, visual disturbances, or discharge  ENT:  No difficulty hearing, tinnitus, vertigo; No sinus or throat pain  NECK: No pain or stiffness  RESPIRATORY: No cough, wheezing, chills or hemoptysis; No Shortness of Breath  CARDIOVASCULAR: No chest pain, palpitations, passing out, dizziness, or leg swelling  GASTROINTESTINAL: No abdominal or epigastric pain. No nausea, vomiting, or hematemesis; No diarrhea or constipation. No melena or hematochezia.  GENITOURINARY: No dysuria, frequency, hematuria, or incontinence  NEUROLOGICAL: No headaches, memory loss, loss of strength, numbness, or tremors  SKIN: No itching, burning, rashes, or lesions   LYMPH Nodes: No enlarged glands  ENDOCRINE: No heat or cold intolerance; No hair loss  MUSCULOSKELETAL: No joint pain or swelling; No muscle, back, or extremity pain  PSYCHIATRIC: No depression, anxiety, mood swings, or difficulty sleeping  HEME/LYMPH: No easy bruising, or bleeding gums  ALLERGY AND IMMUNOLOGIC: No hives or eczema	    [ ] All others negative	  [ ] Unable to obtain    PHYSICAL EXAM:  T(C): 36.3 (05-11-25 @ 11:09), Max: 36.8 (05-10-25 @ 23:49)  HR: 62 (05-11-25 @ 11:09) (56 - 83)  BP: 129/51 (05-11-25 @ 11:09) (113/59 - 129/51)  RR: 18 (05-11-25 @ 11:09) (18 - 20)  SpO2: 100% (05-11-25 @ 11:09) (95% - 100%)  Wt(kg): --  I&O's Summary    10 May 2025 07:01  -  11 May 2025 07:00  --------------------------------------------------------  IN: 0 mL / OUT: 800 mL / NET: -800 mL        Appearance: Normal	  HEENT:   Normal oral mucosa, PERRL, EOMI	  Lymphatic: No lymphadenopathy  Cardiovascular: Normal S1 S2, No JVD, No murmurs, No edema  Respiratory: Lungs clear to auscultation	  Psychiatry: A & O x 3, Mood & affect appropriate  Gastrointestinal:  Soft, Non-tender, + BS	  Skin: No rashes, No ecchymoses, No cyanosis	  Neurologic: Non-focal  Extremities: Normal range of motion, No clubbing, cyanosis or edema  Vascular: Peripheral pulses palpable 2+ bilaterally    MEDICATIONS  (STANDING):  atorvastatin 40 milliGRAM(s) Oral at bedtime  azithromycin  IVPB      azithromycin  IVPB 500 milliGRAM(s) IV Intermittent every 24 hours  cefTRIAXone   IVPB 1000 milliGRAM(s) IV Intermittent every 24 hours  metoprolol succinate ER 25 milliGRAM(s) Oral daily      TELEMETRY: 	Paced Rhythm with 1:1    ECG:  ]:Atrial fibrillation with MVR to slow with demand pacer with 1:1   	  RADIOLOGY:< from: CT Angio Chest PE Protocol w/ IV Cont (05.09.25 @ 19:52) >  IMPRESSION:  1. No evidence of pulmonary embolism.  2. New small to moderate right pleural effusion and small left pleural   effusion with adjacent airspace opacity, favored to represent   atelectasis. Infection is considered less likely, though is possible in   the appropriate clinical setting.  3. Additional chronic and incidental findings are present as detailed   abov    < end of copied text   OTHER: ECHO  < from: TTE W or WO Ultrasound Enhancing Agent (05.10.25 @ 10:41) >  CONCLUSIONS:      1. Left ventricular cavity is small. Left ventricular systolic function is normal with an ejection fraction visually estimated at 50 to 55 %. There are no regional wall motion abnormalities seen.D shaped septum indicative of RV pressure/volume overload.   2. There is increased LV mass and concentric hypertrophy.   3. Mild left ventricular hypertrophy.   4. There is moderate (grade 2) left ventricular diastolic dysfunction.   5. Based on visual assessment, the right ventricle appears mildly enlarged. normal wall thickness, and reduced right ventricular systolic function. Tricuspid annular plane systolic excursion (TAPSE) is 1.5 cm (normal >=1.7 cm).   6. Left atrium is severely dilated.   7. The right atrium is severely dilated.   < from: TTE W or WO Ultrasound Enhancing Agent (05.10.25 @ 10:41) >  ONCLUSIONS:      1. Left ventricular cavity is small. Left ventricular systolic function is normal with an ejection fraction visually estimated at 50 to 55 %. There are no regional wall motion abnormalities seen.D shaped septum indicative of RV pressure/volume overload.   2. There is increased LV mass and concentric hypertrophy.   3. Mild left ventricular hypertrophy.   4. There is moderate (grade 2) left ventricular diastolic dysfunction.   5. Based on visual assessment, the right ventricle appears mildly enlarged. normal wall thickness, and reduced right ventricular systolic function. Tricuspid annular plane systolic excursion (TAPSE) is 1.5 cm (normal >=1.7 cm).   6. Left atrium is severely dilated.   7. The right atrium is severely dilated.   8. Moderate mitral regurgitation.   9. Normal left and right atrial size.  10. Moderate tricuspid regurgitation.  11. Moderate pulmonic regurgitation.  12. Estimated pulmonary artery systolic pressure is 69 mmHg, consistent with elevated pulmonary artery pressure.  13. The inferior vena cava is normal in size measuring 1.80 cm in diameter, (normal <2.1cm) with abnormal inspiratory collapse (abnormal <50%) consistent with mildly elevated right atrial pressure (~8, range 5-10mmHg).  14. No pericardial effusion seen.  15. No prior echocardiogram is available for compariso    < end of copied text >  	  	  LABS:	 	    CARDIAC MARKERS:      Troponin I, High Sensitivity Result: 36.3 ng/L (05-09 @ 20:25)  Troponin I, High Sensitivity Result: 40.3 ng/L (05-09 @ 18:25)                            6.1    7.77  )-----------( 165      ( 11 May 2025 10:55 )             19.0     05-11    145  |  116[H]  |  81[H]  ----------------------------<  111[H]  3.7   |  20[L]  |  1.71[H]    Ca    8.8      11 May 2025 10:55  Phos  4.5     05-11  Mg     2.1     05-11    TPro  5.8[L]  /  Alb  2.9[L]  /  TBili  0.4  /  DBili  x   /  AST  16  /  ALT  22  /  AlkPhos  53  05-11    proBNP:   Lipid Profile: Cholesterol 73  LDL --  HDL 31      HgA1c:   TSH: Thyroid Stimulating Hormone, Serum: 1.38 uU/mL (05-10 @ 07:37)

## 2025-05-11 NOTE — PROVIDER CONTACT NOTE (CRITICAL VALUE NOTIFICATION) - TEST AND RESULT REPORTED:
Discharge Instructions For Your Heart Catheterization with Radial/Wrist Site    Activity: For the next 24 hours  Follow these guidelines related to the sedation medicine that you've received. Â· You must have someone drive you home. Â· You may feel tired, unsteady, or not quite yourself for the remainder of the day due to the sedation medicine. Your body gets rid of the medicine usually in 24 hours. Â· Do not drive or operate machinery or power tools. Â· Do not drink alcohol or smoke. Â· Do not make any important decisions or sign important papers. Activity:   Follow these guidelines related to the puncture site in your wrist.  Â· Minimal use of the arm used for the procedure for the remainder of the day. If possible, elevate your arm on a pillow and don't bend your wrist.  Â· No lifting more than 5 pounds for 5 days with the arm used for the procedure. Â· If you do heavy physical work on your job, follow your doctor's instructions about when you may return to work. Â· Use ice pack for discomfort. Procedure Site Care:  Â· Keep the dressing on your procedure site for the remainder of the day. The following day, you may remove the dressing and shower. Gently cleanse the procedure site with soap and water and a clean wash cloth and pat dry. Â· Apply a new Band-aid on the puncture site for 1 day; Do not apply lotions, powders, or creams. Â· No soaking the wrist in water (i.e., bathtub, hot tub, dish water, pool of water) until the wound has completely healed. Common side effects you may notice  Â· Soreness at puncture site. Â· Slight bruising at the site for several days to several weeks. Â· Lump the size of a pea or marble at the puncture site for a few weeks. Diet/Fluids  Â· Resume diet as prior to admission.   Â· If you had a catheterization or stent, drink plenty of liquids to help flush the dye used for your procedure out of your body (unless you're on a fluid restriction ordered by your
physician). Medications  Â· Take mild pain reliever such as Tylenol/Acetaminophen as needed for pain. Use ice pack for discomfort. Call your doctor with these issues  Â· Bleeding from the procedure site. If significant bleeding occurs or there is a growing lump at your site, apply firm pressure at the site where your dressing is. Call 911 and keep pressure on the site. Â· Numbness, tingling or color change in the arm used for puncture site. Â· Increasing pain and firmness near the puncture site. Â· A temperature greater than 101 degrees. Â· Redness or drainage around site. Call Dr Iglesia Arteaga with any procedure site issues: [phone]    Plan;  Findings from the heart catheterization; No areas of blockage but heart muscle is weak. 1)   Schedule appointment for sleep apnea   2)  Attempt calorie reduction with  weight loss and exercise to help lesson the workload on your heart  3) Meet with the heart failure team   4)  Start new medicines to take a work load off of the heart. If possible purchase a blood pressure cuff ( Look for one with an adjustable arm cuff and an A/C adaptor; example, brand Omron at [facility] $78.99. Then record blood pressure and heart rate and bring along to doctor appointments.   This information will help the team adjust your medicines
H/H 6.1/19
Hgb 6.3/Hct 19.4

## 2025-05-11 NOTE — PROGRESS NOTE ADULT - PROBLEM SELECTOR PLAN 1
p/w sob x 1 week   CXR -New small to moderate right pleural effusion and small left pleural effusion with adjacent airspace opacity  BNP 2.3 K   Appears mildly hypervolemic on lung exam, also appears dehydrated ( dry mucus membranes )     takes metoprolol, lasix, losartan as per superscripts   s/p lasix 40 in ER   -c/w toprol with holding parameters   -Remote tele  -Troponin neg x 1   -daily weights, strict I&O  [ ] obtain OP TTE results   -Cardiology consulted: Dr Lim

## 2025-05-11 NOTE — PROGRESS NOTE ADULT - PROBLEM SELECTOR PLAN 2
WBC WNL    neutrophilic shift  T afebrile, reports subjective fever and chills x 1  week   CXR as above   RVP neg    s/p CTX in ED  ESR WNL; procal 0.3, CRP 6.2  - cw CTX, AZT  -f/u strep pneumo, legionella, mycoplasma  -f/u cx

## 2025-05-11 NOTE — PROGRESS NOTE ADULT - SUBJECTIVE AND OBJECTIVE BOX
PGY-1 Progress Note discussed with attending    PLEASE MS TEAMS AUTHOR TILL 5:00 PM    PLEASE CONTACT ON CALL TEAM:  - On Call Team (Please refer to Gypsy) FROM 5:00 PM - 8:30PM  - Nightfloat Team FROM 8:30 -7:30 AM      INTERVAL HPI/OVERNIGHT EVENTS: No acute events overnight.    SUBJECTIVE: Patient seen and examined at bedside this morning. ***INCOMPLETE***    ---------------------------    REVIEW OF SYSTEMS:  CONSTITUTIONAL: No fever, weight loss, fatigue  RESPIRATORY: No cough, wheezing, chills, hemoptysis, shortness of breath  CARDIOVASCULAR: No chest pain, palpitations, dizziness, leg swelling  GASTROINTESTINAL: No abdominal pain, nausea, vomiting, hematemesis, diarrhea, constipation, melena, hematochezia  GENITOURINARY: No dysuria, hematuria, urinary frequency  NEUROLOGICAL: No headaches, memory loss, loss of strength, numbness, tremors  SKIN: No itching, burning, rashes, lesions     MEDICATIONS  (STANDING):  atorvastatin 40 milliGRAM(s) Oral at bedtime  azithromycin  IVPB      azithromycin  IVPB 500 milliGRAM(s) IV Intermittent every 24 hours  cefTRIAXone   IVPB 1000 milliGRAM(s) IV Intermittent every 24 hours  metoprolol succinate ER 25 milliGRAM(s) Oral daily    MEDICATIONS  (PRN):  acetaminophen     Tablet .. 650 milliGRAM(s) Oral every 6 hours PRN Temp greater or equal to 38C (100.4F), Mild Pain (1 - 3)  benzocaine/menthol Lozenge 1 Lozenge Oral every 6 hours PRN cough  guaiFENesin Oral Liquid (Sugar-Free) 200 milliGRAM(s) Oral every 6 hours PRN Cough      Vital Signs Last 24 Hrs  T(C): 36.6 (11 May 2025 08:21), Max: 36.8 (10 May 2025 23:49)  T(F): 97.9 (11 May 2025 08:21), Max: 98.2 (10 May 2025 23:49)  HR: 64 (11 May 2025 08:21) (56 - 92)  BP: 113/59 (11 May 2025 08:21) (104/69 - 127/67)  BP(mean): 82 (10 May 2025 20:00) (82 - 83)  RR: 18 (11 May 2025 08:21) (18 - 20)  SpO2: 95% (11 May 2025 08:21) (95% - 99%)    Parameters below as of 11 May 2025 08:21  Patient On (Oxygen Delivery Method): nasal cannula  O2 Flow (L/min): 2      -----------------------------    PHYSICAL EXAMINATION:  GENERAL: NAD, lying in bed  HEAD:  Atraumatic, Normocephalic  EYES:  conjunctiva and sclera clear  NECK: Supple, No JVD  CHEST/LUNG: Clear to auscultation bilaterally; No rales, rhonchi, wheezing, or rubs  HEART: Regular rate and rhythm; No murmurs, rubs, or gallops  ABDOMEN: Soft, Nontender, Nondistended; Bowel sounds present, no guarding  NERVOUS SYSTEM:  Alert & Oriented X3  : voiding well  EXTREMITIES:  2+ Peripheral Pulses, No clubbing, cyanosis, or edema  SKIN: warm dry                          6.3    8.82  )-----------( 203      ( 10 May 2025 07:37 )             19.4     05-10    144  |  114[H]  |  97[H]  ----------------------------<  154[H]  3.6   |  21[L]  |  1.53[H]    Ca    8.9      10 May 2025 07:37  Phos  3.8     05-10  Mg     2.0     05-10    TPro  5.3[L]  /  Alb  2.5[L]  /  TBili  0.5  /  DBili  x   /  AST  14  /  ALT  19  /  AlkPhos  48  05-10    LIVER FUNCTIONS - ( 10 May 2025 07:37 )  Alb: 2.5 g/dL / Pro: 5.3 g/dL / ALK PHOS: 48 U/L / ALT: 19 U/L DA / AST: 14 U/L / GGT: x               PT/INR - ( 09 May 2025 18:25 )   PT: 16.9 sec;   INR: 1.46 ratio         PTT - ( 09 May 2025 18:25 )  PTT:30.1 sec    I&O's Summary    10 May 2025 07:01  -  11 May 2025 07:00  --------------------------------------------------------  IN: 0 mL / OUT: 800 mL / NET: -800 mL          Culture - Blood (collected 09 May 2025 20:25)  Source: Blood Blood-Peripheral  Preliminary Report (11 May 2025 02:02):    No growth at 24 hours    Culture - Blood (collected 09 May 2025 20:20)  Source: Blood Blood-Peripheral  Preliminary Report (11 May 2025 02:02):    No growth at 24 hours        CAPILLARY BLOOD GLUCOSE      RADIOLOGY & ADDITIONAL TESTS:                   PGY-1 Progress Note discussed with attending    PLEASE MS TEAMS AUTHOR TILL 5:00 PM    PLEASE CONTACT ON CALL TEAM:  - On Call Team (Please refer to Gypsy) FROM 5:00 PM - 8:30PM  - Nightfloat Team FROM 8:30 -7:30 AM      INTERVAL HPI/OVERNIGHT EVENTS: No acute events overnight.    SUBJECTIVE: Patient seen and examined at bedside this morning. Denies any symptoms.    ---------------------------    REVIEW OF SYSTEMS:  CONSTITUTIONAL: No fever, weight loss, fatigue  RESPIRATORY: No cough, wheezing, chills, hemoptysis, shortness of breath  CARDIOVASCULAR: No chest pain, palpitations, dizziness, leg swelling  GASTROINTESTINAL: No abdominal pain, nausea, vomiting, hematemesis, diarrhea, constipation, melena, hematochezia  GENITOURINARY: No dysuria, hematuria, urinary frequency  NEUROLOGICAL: No headaches, memory loss, loss of strength, numbness, tremors  SKIN: No itching, burning, rashes, lesions     MEDICATIONS  (STANDING):  atorvastatin 40 milliGRAM(s) Oral at bedtime  azithromycin  IVPB      azithromycin  IVPB 500 milliGRAM(s) IV Intermittent every 24 hours  cefTRIAXone   IVPB 1000 milliGRAM(s) IV Intermittent every 24 hours  metoprolol succinate ER 25 milliGRAM(s) Oral daily    MEDICATIONS  (PRN):  acetaminophen     Tablet .. 650 milliGRAM(s) Oral every 6 hours PRN Temp greater or equal to 38C (100.4F), Mild Pain (1 - 3)  benzocaine/menthol Lozenge 1 Lozenge Oral every 6 hours PRN cough  guaiFENesin Oral Liquid (Sugar-Free) 200 milliGRAM(s) Oral every 6 hours PRN Cough      Vital Signs Last 24 Hrs  T(C): 36.6 (11 May 2025 08:21), Max: 36.8 (10 May 2025 23:49)  T(F): 97.9 (11 May 2025 08:21), Max: 98.2 (10 May 2025 23:49)  HR: 64 (11 May 2025 08:21) (56 - 92)  BP: 113/59 (11 May 2025 08:21) (104/69 - 127/67)  BP(mean): 82 (10 May 2025 20:00) (82 - 83)  RR: 18 (11 May 2025 08:21) (18 - 20)  SpO2: 95% (11 May 2025 08:21) (95% - 99%)    Parameters below as of 11 May 2025 08:21  Patient On (Oxygen Delivery Method): nasal cannula  O2 Flow (L/min): 2      -----------------------------    PHYSICAL EXAMINATION:  GENERAL: NAD, lying comfortably in bed  HEAD:  Atraumatic, Normocephalic  EYES:  PERRLA,  conjunctiva and sclera clear  NECK: Supple,    CHEST WALL: Nontender  LUNG: mild BL basilar crackles, R>L; saturating well on 2L  HEART: Regular rate and rhythm; No murmurs,  ABDOMEN: Soft, Nontender, Nondistended; Bowel sounds present, No Rovsing's sign   NERVOUS SYSTEM:  Alert & Oriented X3  EXTREMITIES: +LLE edema (chronic)  CALF: No Calf tenderness   SKIN: warm dry                          6.3    8.82  )-----------( 203      ( 10 May 2025 07:37 )             19.4     05-10    144  |  114[H]  |  97[H]  ----------------------------<  154[H]  3.6   |  21[L]  |  1.53[H]    Ca    8.9      10 May 2025 07:37  Phos  3.8     05-10  Mg     2.0     05-10    TPro  5.3[L]  /  Alb  2.5[L]  /  TBili  0.5  /  DBili  x   /  AST  14  /  ALT  19  /  AlkPhos  48  05-10    LIVER FUNCTIONS - ( 10 May 2025 07:37 )  Alb: 2.5 g/dL / Pro: 5.3 g/dL / ALK PHOS: 48 U/L / ALT: 19 U/L DA / AST: 14 U/L / GGT: x               PT/INR - ( 09 May 2025 18:25 )   PT: 16.9 sec;   INR: 1.46 ratio         PTT - ( 09 May 2025 18:25 )  PTT:30.1 sec    I&O's Summary    10 May 2025 07:01  -  11 May 2025 07:00  --------------------------------------------------------  IN: 0 mL / OUT: 800 mL / NET: -800 mL          Culture - Blood (collected 09 May 2025 20:25)  Source: Blood Blood-Peripheral  Preliminary Report (11 May 2025 02:02):    No growth at 24 hours    Culture - Blood (collected 09 May 2025 20:20)  Source: Blood Blood-Peripheral  Preliminary Report (11 May 2025 02:02):    No growth at 24 hours        CAPILLARY BLOOD GLUCOSE      RADIOLOGY & ADDITIONAL TESTS:

## 2025-05-11 NOTE — PROGRESS NOTE ADULT - ASSESSMENT
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 Labs s/o Hb 8 ( from 11.4), neutrophilic shift , BUN/ Cr 88/ 1.66 from ( 28/1.26) , lact 3.3, p BNP 2.3 K . Imaging c/w BL pleural effusions ? R sided infiltrate . S/p lasix 40 , CTX 2 g .     Admitted to medicine for AHRF 2/2 CHF vs PNA     PRIMARY TEAM KINDLY CONFIRM MEDS IN A.M.   Patient does not remember, and we are unable to reach pharmacy. Current list is entered from [facility] [place].

## 2025-05-12 DIAGNOSIS — R53.81 OTHER MALAISE: ICD-10-CM

## 2025-05-12 DIAGNOSIS — Z51.5 ENCOUNTER FOR PALLIATIVE CARE: ICD-10-CM

## 2025-05-12 DIAGNOSIS — E43 UNSPECIFIED SEVERE PROTEIN-CALORIE MALNUTRITION: ICD-10-CM

## 2025-05-12 DIAGNOSIS — I50.32 CHRONIC DIASTOLIC (CONGESTIVE) HEART FAILURE: ICD-10-CM

## 2025-05-12 DIAGNOSIS — N17.9 ACUTE KIDNEY FAILURE, UNSPECIFIED: ICD-10-CM

## 2025-05-12 DIAGNOSIS — F32.9 MAJOR DEPRESSIVE DISORDER, SINGLE EPISODE, UNSPECIFIED: ICD-10-CM

## 2025-05-12 LAB
ANION GAP SERPL CALC-SCNC: 7 MMOL/L — SIGNIFICANT CHANGE UP (ref 5–17)
BUN SERPL-MCNC: 66 MG/DL — HIGH (ref 7–18)
CALCIUM SERPL-MCNC: 8.8 MG/DL — SIGNIFICANT CHANGE UP (ref 8.4–10.5)
CHLORIDE SERPL-SCNC: 114 MMOL/L — HIGH (ref 96–108)
CO2 SERPL-SCNC: 23 MMOL/L — SIGNIFICANT CHANGE UP (ref 22–31)
CREAT SERPL-MCNC: 1.63 MG/DL — HIGH (ref 0.5–1.3)
EGFR: 29 ML/MIN/1.73M2 — LOW
EGFR: 29 ML/MIN/1.73M2 — LOW
GLUCOSE SERPL-MCNC: 138 MG/DL — HIGH (ref 70–99)
HCT VFR BLD CALC: 23 % — LOW (ref 34.5–45)
HGB BLD-MCNC: 7.7 G/DL — LOW (ref 11.5–15.5)
MAGNESIUM SERPL-MCNC: 2.1 MG/DL — SIGNIFICANT CHANGE UP (ref 1.6–2.6)
MCHC RBC-ENTMCNC: 27.7 PG — SIGNIFICANT CHANGE UP (ref 27–34)
MCHC RBC-ENTMCNC: 33.5 G/DL — SIGNIFICANT CHANGE UP (ref 32–36)
MCV RBC AUTO: 82.7 FL — SIGNIFICANT CHANGE UP (ref 80–100)
NRBC BLD AUTO-RTO: 0 /100 WBCS — SIGNIFICANT CHANGE UP (ref 0–0)
PHOSPHATE SERPL-MCNC: 3.7 MG/DL — SIGNIFICANT CHANGE UP (ref 2.5–4.5)
PLATELET # BLD AUTO: 176 K/UL — SIGNIFICANT CHANGE UP (ref 150–400)
POTASSIUM SERPL-MCNC: 4.2 MMOL/L — SIGNIFICANT CHANGE UP (ref 3.5–5.3)
POTASSIUM SERPL-SCNC: 4.2 MMOL/L — SIGNIFICANT CHANGE UP (ref 3.5–5.3)
RBC # BLD: 2.78 M/UL — LOW (ref 3.8–5.2)
RBC # FLD: 18.9 % — HIGH (ref 10.3–14.5)
SODIUM SERPL-SCNC: 144 MMOL/L — SIGNIFICANT CHANGE UP (ref 135–145)
WBC # BLD: 7.89 K/UL — SIGNIFICANT CHANGE UP (ref 3.8–10.5)
WBC # FLD AUTO: 7.89 K/UL — SIGNIFICANT CHANGE UP (ref 3.8–10.5)

## 2025-05-12 PROCEDURE — 99222 1ST HOSP IP/OBS MODERATE 55: CPT

## 2025-05-12 PROCEDURE — 99232 SBSQ HOSP IP/OBS MODERATE 35: CPT

## 2025-05-12 PROCEDURE — 93288 INTERROG EVL PM/LDLS PM IP: CPT | Mod: 26

## 2025-05-12 PROCEDURE — 99233 SBSQ HOSP IP/OBS HIGH 50: CPT | Mod: GC

## 2025-05-12 PROCEDURE — 99497 ADVNCD CARE PLAN 30 MIN: CPT | Mod: 25

## 2025-05-12 PROCEDURE — 99223 1ST HOSP IP/OBS HIGH 75: CPT

## 2025-05-12 RX ADMIN — FUROSEMIDE 40 MILLIGRAM(S): 10 INJECTION INTRAMUSCULAR; INTRAVENOUS at 00:57

## 2025-05-12 RX ADMIN — ATORVASTATIN CALCIUM 40 MILLIGRAM(S): 80 TABLET, FILM COATED ORAL at 21:42

## 2025-05-12 RX ADMIN — METOPROLOL SUCCINATE 25 MILLIGRAM(S): 50 TABLET, EXTENDED RELEASE ORAL at 05:31

## 2025-05-12 NOTE — CONSULT NOTE ADULT - CONVERSATION DETAILS
Met with the pt and her HCP Mckayla at the bedside, introduce role and scope of palliative care team as supportive  in the setting of complex comorbidities/serious illnesses, to assist with complex medical decision making and any associated pain or symptom management.  Pt endorsed she has been living home alone independently with the support of her friends who help with her shopping.  Pt has clearly expressed that she regrets coming to the hospital and does not wish to return in the future.  Discussed given her advanced cardiopulmonary disease, functional status and acute decompensation her prognosis is very poor.  Pt is appropriate for hospice. Met with the pt and her HCP Mckayla at the bedside, introduce role and scope of palliative care team as supportive  in the setting of complex comorbidities/serious illnesses, to assist with complex medical decision making and any associated pain or symptom management.  Pt endorsed she has been living home alone independently with the support of her friends who help with her shopping.  Pt has clearly expressed that she regrets coming to the hospital and does not wish to return in the future.  Discussed given her advanced cardiopulmonary disease, functional status and acute decompensation her prognosis is very poor.  Pt is appropriate for hospice.   Pt agreed for long term care with hospice.  SW referral made.  Confirmed MOLST on file: DNR/DNI/no PEG/comfort measures only/DNH.   Chaplaincy offered and accepted.  All questions answered.  Support provided.  d/w Primary team

## 2025-05-12 NOTE — BH CONSULTATION LIAISON ASSESSMENT NOTE - RISK ASSESSMENT
Given her chronic death wishes and recent episode of self-harm, she is at an elevated risk, more chronic.

## 2025-05-12 NOTE — BH CONSULTATION LIAISON ASSESSMENT NOTE - NSBHCHARTREVIEWLAB_PSY_A_CORE FT
PROVIDER:[TOKEN:[[id number]],FOLLOWUP:[1 week],ESTABLISHEDPATIENT:[T]] CBC Full  -  ( 12 May 2025 10:20 )  WBC Count : 7.89 K/uL  RBC Count : 2.78 M/uL  Hemoglobin : 7.7 g/dL  Hematocrit : 23.0 %  Platelet Count - Automated : 176 K/uL  Mean Cell Volume : 82.7 fl  Mean Cell Hemoglobin : 27.7 pg  Mean Cell Hemoglobin Concentration : 33.5 g/dL  Auto Neutrophil # : x  Auto Lymphocyte # : x  Auto Monocyte # : x  Auto Eosinophil # : x  Auto Basophil # : x  Auto Neutrophil % : x  Auto Lymphocyte % : x  Auto Monocyte % : x  Auto Eosinophil % : x  Auto Basophil % : x  05-12    144  |  114[H]  |  66[H]  ----------------------------<  138[H]  4.2   |  23  |  1.63[H]    Ca    8.8      12 May 2025 10:20  Phos  3.7     05-12  Mg     2.1     05-12    TPro  5.8[L]  /  Alb  2.9[L]  /  TBili  0.4  /  DBili  x   /  AST  16  /  ALT  22  /  AlkPhos  53  05-11   PROVIDER:[TOKEN:[[id number]],FOLLOWUP:[1 week],ESTABLISHEDPATIENT:[T]],PROVIDER:[TOKEN:[[id number]]]

## 2025-05-12 NOTE — BH CONSULTATION LIAISON ASSESSMENT NOTE - NSBHCHARTREVIEWVS_PSY_A_CORE FT
Vital Signs Last 24 Hrs  T(C): 36.5 (12 May 2025 11:04), Max: 37 (11 May 2025 22:30)  T(F): 97.7 (12 May 2025 11:04), Max: 98.6 (11 May 2025 22:30)  HR: 62 (12 May 2025 11:04) (53 - 72)  BP: 129/57 (12 May 2025 11:04) (126/62 - 159/62)  BP(mean): 86 (12 May 2025 07:37) (86 - 86)  RR: 18 (12 May 2025 11:04) (18 - 18)  SpO2: 97% (12 May 2025 11:04) (96% - 100%)    Parameters below as of 12 May 2025 11:04  Patient On (Oxygen Delivery Method): nasal cannula  O2 Flow (L/min): 2

## 2025-05-12 NOTE — PROGRESS NOTE ADULT - ASSESSMENT
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  Admitted to medicine for AHRF 2/2 CHF vs PNA      1. Chronic atrial fibrillation.   Off Eliquis at present  due to low H/H, resume once bleeding stabilizes  Rate stable Due to Pacemaker.  Continue Toprol XL 25mg daily    2.  Anemia.   management as per primary team    3.Pleural effusion.   Patient clinically not in CHF  hold off diuresis  ECHO      4. HTN  BPs controlled  Home BP medications on hold    5. HLD  Continue atorvastatin 40mg PO QHS

## 2025-05-12 NOTE — BH CONSULTATION LIAISON ASSESSMENT NOTE - SUMMARY
89 y/o F with a hx of HTN, HLD, chronic LLE edema, Afib on Eliquis, and a recent PPM placement, who is admitted due to AHRD due to CHF vs pneumonia. She is consulted due to concerns about a recent self-harm episode as per collateral. Patient with intermittent death wishes associated with her advanced age and worsening medical problems. Nonetheless, her presentation appears to be consistent with an adjustment disorder with depressed mood vs clinical depression. She is not suicidal, homicidal or psychotic.    -No need for standing psychotropics   -No need for an inpatient psych admission or 1:1  -Agree with Pall Care involvement  -SW f/u  -Case discussed with the primary team  -Will follow as needed

## 2025-05-12 NOTE — CONSULT NOTE ADULT - CONVERSATION/DISCUSSION
Diagnosis/Prognosis/MOLST Discussed/Treatment Options/Chaplaincy Referral Diagnosis/Prognosis/MOLST Discussed/Treatment Options/Chaplaincy Referral/Hospice Referral

## 2025-05-12 NOTE — CONSULT NOTE ADULT - PROBLEM SELECTOR RECOMMENDATION 4
Clinical evidence indicates that the patient has Severe protein calorie malnutrition/ 3rd degree    In context of   Chronic Illness (>1 month)    Energy/Food intake <50% of estimated energy requirement >5 days  Weight loss: Moderate - severe (lbs lost recently)  Body Fat loss: Severe   (Cachexia, temporal wasting, contracted, muscle atrophy)  Muscle mass loss: Severe  (Skin failure/pressure ulcers)  Fluid Accumulation: Severe (Fluid overload, ascites, pleural effusions)   Strength: weakened severe (bedbound)    Recommend:   pleasure feeds as tolerated - aspiration precautions, careful hand-feeding, teaching to caregivers  nutritional supplements as tolerated, nutrition consult    No PEG BATSHEVA on CKD. Likely cardio-renal w hypervolemia Scr stable         Avoid Nephrotoxic Meds/ NSAIDs,

## 2025-05-12 NOTE — PROGRESS NOTE ADULT - SUBJECTIVE AND OBJECTIVE BOX
PGY-1 Progress Note discussed with attending    PLEASE MS TEAMS AUTHOR TILL 5:00 PM    PLEASE CONTACT ON CALL TEAM:  - On Call Team (Please refer to Gypsy) FROM 5:00 PM - 8:30PM  - Nightfloat Team FROM 8:30 -7:30 AM      INTERVAL HPI/OVERNIGHT EVENTS: No acute events overnight.    SUBJECTIVE: Patient seen and examined at bedside this morning. ***INCOMPLETE***    ---------------------------    REVIEW OF SYSTEMS:  CONSTITUTIONAL: No fever, weight loss, fatigue  RESPIRATORY: No cough, wheezing, chills, hemoptysis, shortness of breath  CARDIOVASCULAR: No chest pain, palpitations, dizziness, leg swelling  GASTROINTESTINAL: No abdominal pain, nausea, vomiting, hematemesis, diarrhea, constipation, melena, hematochezia  GENITOURINARY: No dysuria, hematuria, urinary frequency  NEUROLOGICAL: No headaches, memory loss, loss of strength, numbness, tremors  SKIN: No itching, burning, rashes, lesions     MEDICATIONS  (STANDING):  atorvastatin 40 milliGRAM(s) Oral at bedtime  metoprolol succinate ER 25 milliGRAM(s) Oral daily    MEDICATIONS  (PRN):  acetaminophen     Tablet .. 650 milliGRAM(s) Oral every 6 hours PRN Temp greater or equal to 38C (100.4F), Mild Pain (1 - 3)  benzocaine/menthol Lozenge 1 Lozenge Oral every 6 hours PRN cough  guaiFENesin Oral Liquid (Sugar-Free) 200 milliGRAM(s) Oral every 6 hours PRN Cough      Vital Signs Last 24 Hrs  T(C): 36.4 (12 May 2025 07:37), Max: 37 (11 May 2025 22:30)  T(F): 97.5 (12 May 2025 07:37), Max: 98.6 (11 May 2025 22:30)  HR: 53 (12 May 2025 07:37) (53 - 72)  BP: 133/72 (12 May 2025 07:37) (126/62 - 159/62)  BP(mean): 86 (12 May 2025 07:37) (86 - 86)  RR: 18 (12 May 2025 07:37) (18 - 18)  SpO2: 100% (12 May 2025 07:37) (96% - 100%)    Parameters below as of 12 May 2025 07:37  Patient On (Oxygen Delivery Method): nasal cannula  O2 Flow (L/min): 2      -----------------------------    PHYSICAL EXAMINATION:  GENERAL: NAD, lying in bed  HEAD:  Atraumatic, Normocephalic  EYES:  conjunctiva and sclera clear  NECK: Supple, No JVD  CHEST/LUNG: Clear to auscultation bilaterally; No rales, rhonchi, wheezing, or rubs  HEART: Regular rate and rhythm; No murmurs, rubs, or gallops  ABDOMEN: Soft, Nontender, Nondistended; Bowel sounds present, no guarding  NERVOUS SYSTEM:  Alert & Oriented X3  : voiding well  EXTREMITIES:  2+ Peripheral Pulses, No clubbing, cyanosis, or edema  SKIN: warm dry                          7.9    7.79  )-----------( 183      ( 11 May 2025 23:45 )             23.7     05-11    145  |  116[H]  |  81[H]  ----------------------------<  111[H]  3.7   |  20[L]  |  1.71[H]    Ca    8.8      11 May 2025 10:55  Phos  4.5     05-11  Mg     2.1     05-11    TPro  5.8[L]  /  Alb  2.9[L]  /  TBili  0.4  /  DBili  x   /  AST  16  /  ALT  22  /  AlkPhos  53  05-11    LIVER FUNCTIONS - ( 11 May 2025 10:55 )  Alb: 2.9 g/dL / Pro: 5.8 g/dL / ALK PHOS: 53 U/L / ALT: 22 U/L DA / AST: 16 U/L / GGT: x                   I&O's Summary    11 May 2025 07:01  -  12 May 2025 07:00  --------------------------------------------------------  IN: 0 mL / OUT: 900 mL / NET: -900 mL          Culture - Blood (collected 09 May 2025 20:25)  Source: Blood Blood-Peripheral  Preliminary Report (12 May 2025 02:02):    No growth at 48 Hours    Culture - Blood (collected 09 May 2025 20:20)  Source: Blood Blood-Peripheral  Preliminary Report (12 May 2025 02:02):    No growth at 48 Hours        CAPILLARY BLOOD GLUCOSE      RADIOLOGY & ADDITIONAL TESTS:                   PGY-1 Progress Note discussed with attending    PLEASE MS TEAMS AUTHOR TILL 5:00 PM    PLEASE CONTACT ON CALL TEAM:  - On Call Team (Please refer to Gypsy) FROM 5:00 PM - 8:30PM  - Nightfloat Team FROM 8:30 -7:30 AM      INTERVAL HPI/OVERNIGHT EVENTS: No acute events overnight. Repeat CBC post-transfusion 7.9, increased from 6.1.    SUBJECTIVE: Patient seen and examined at bedside this morning. Feels lonely in her new room. Otherwise denies any complaints.    ---------------------------    REVIEW OF SYSTEMS:  CONSTITUTIONAL: No fever, weight loss, fatigue  RESPIRATORY: No cough, wheezing, chills, hemoptysis, shortness of breath  CARDIOVASCULAR: No chest pain, palpitations, dizziness, leg swelling  GASTROINTESTINAL: No abdominal pain, nausea, vomiting, hematemesis, diarrhea, constipation, melena, hematochezia  GENITOURINARY: No dysuria, hematuria, urinary frequency  NEUROLOGICAL: No headaches, memory loss, loss of strength, numbness, tremors  SKIN: No itching, burning, rashes, lesions     MEDICATIONS  (STANDING):  atorvastatin 40 milliGRAM(s) Oral at bedtime  metoprolol succinate ER 25 milliGRAM(s) Oral daily    MEDICATIONS  (PRN):  acetaminophen     Tablet .. 650 milliGRAM(s) Oral every 6 hours PRN Temp greater or equal to 38C (100.4F), Mild Pain (1 - 3)  benzocaine/menthol Lozenge 1 Lozenge Oral every 6 hours PRN cough  guaiFENesin Oral Liquid (Sugar-Free) 200 milliGRAM(s) Oral every 6 hours PRN Cough      Vital Signs Last 24 Hrs  T(C): 36.4 (12 May 2025 07:37), Max: 37 (11 May 2025 22:30)  T(F): 97.5 (12 May 2025 07:37), Max: 98.6 (11 May 2025 22:30)  HR: 53 (12 May 2025 07:37) (53 - 72)  BP: 133/72 (12 May 2025 07:37) (126/62 - 159/62)  BP(mean): 86 (12 May 2025 07:37) (86 - 86)  RR: 18 (12 May 2025 07:37) (18 - 18)  SpO2: 100% (12 May 2025 07:37) (96% - 100%)    Parameters below as of 12 May 2025 07:37  Patient On (Oxygen Delivery Method): nasal cannula  O2 Flow (L/min): 2      -----------------------------    PHYSICAL EXAMINATION:  GENERAL: NAD, lying comfortably in bed  HEAD:  Atraumatic, Normocephalic  EYES:  PERRLA,  conjunctiva and sclera clear  NECK: Supple,    CHEST WALL: Nontender  LUNG: mild BL basilar crackles, R>L; saturating well on 2L  HEART: Regular rate and rhythm; No murmurs,  ABDOMEN: Soft, Nontender, Nondistended; Bowel sounds present, No Rovsing's sign   NERVOUS SYSTEM:  Alert & Oriented X3  EXTREMITIES: +LLE edema (chronic)  CALF: No Calf tenderness   SKIN: warm dry                          7.9    7.79  )-----------( 183      ( 11 May 2025 23:45 )             23.7     05-11    145  |  116[H]  |  81[H]  ----------------------------<  111[H]  3.7   |  20[L]  |  1.71[H]    Ca    8.8      11 May 2025 10:55  Phos  4.5     05-11  Mg     2.1     05-11    TPro  5.8[L]  /  Alb  2.9[L]  /  TBili  0.4  /  DBili  x   /  AST  16  /  ALT  22  /  AlkPhos  53  05-11    LIVER FUNCTIONS - ( 11 May 2025 10:55 )  Alb: 2.9 g/dL / Pro: 5.8 g/dL / ALK PHOS: 53 U/L / ALT: 22 U/L DA / AST: 16 U/L / GGT: x                   I&O's Summary    11 May 2025 07:01  -  12 May 2025 07:00  --------------------------------------------------------  IN: 0 mL / OUT: 900 mL / NET: -900 mL          Culture - Blood (collected 09 May 2025 20:25)  Source: Blood Blood-Peripheral  Preliminary Report (12 May 2025 02:02):    No growth at 48 Hours    Culture - Blood (collected 09 May 2025 20:20)  Source: Blood Blood-Peripheral  Preliminary Report (12 May 2025 02:02):    No growth at 48 Hours        CAPILLARY BLOOD GLUCOSE      RADIOLOGY & ADDITIONAL TESTS:

## 2025-05-12 NOTE — BH CONSULTATION LIAISON ASSESSMENT NOTE - NSICDXBHSECONDARYDX_PSY_ALL_CORE
Chronic atrial fibrillation   I48.20  HTN (hypertension)   I10  HLD (hyperlipidemia)   E78.5  Chronic kidney disease (CKD)   N18.9  Acute hypoxic respiratory failure   J96.01  Anemia   D64.9  Pleural effusion   J90  Preventive measure   Z29.9  Pulmonary hypertension   I27.20  Palliative care encounter   Z51.5  BATSHEVA (acute kidney injury)   N17.9  Severe protein-calorie malnutrition   E43  Debility   R53.81

## 2025-05-12 NOTE — CONSULT NOTE ADULT - PROBLEM SELECTOR RECOMMENDATION 6
Palliative Care consulted for complex decision making in the setting of advanced illness Pt was ambulatory w walker or a cane.  Currently bedbound. Requires assistance with ADLs. Risk for recurrent infections, skin failure/breakdown, and repeat hospital admissions.  Supportive care  Freq positioning    Pt eval

## 2025-05-12 NOTE — BH CONSULTATION LIAISON ASSESSMENT NOTE - HPI (INCLUDE ILLNESS QUALITY, SEVERITY, DURATION, TIMING, CONTEXT, MODIFYING FACTORS, ASSOCIATED SIGNS AND SYMPTOMS)
[id number]  [address] [facility] [address] [id number]  Dept: [phone]  Dept Fax: [phone]  Loc: [phone]    Shelley Sands is a 11 y.o. female who presents today for her medical conditions/complaints as noted below. Shelley Sands is complaining of Facial Swelling        HPI:   HPI    Tereza Starch to the office accompanied by mother complaining of right-sided facial swelling. Mother states that child has a known cavity on her right lower molar, patient has dentist appointment in mid November. Patient woke up with swelling this morning. Patient reports mild pain. Temperature has not ran around 99. No past medical history on file. No past surgical history on file. No family history on file. Social History     Tobacco Use    Smoking status: Never    Smokeless tobacco: Never   Substance Use Topics    Alcohol use: Not on file        Current Outpatient Medications   Medication Sig Dispense Refill    amoxicillin-clavulanate (AUGMENTIN) 250-62.5 MG/5ML suspension Take 4.3 mLs by mouth 2 times daily for 10 days 86 mL 0    cetirizine (ZYRTEC) 1 MG/ML syrup Take 2.5 mLs by mouth daily 120 mL 2     No current facility-administered medications for this visit.        No Known Allergies    Health Maintenance   Topic Date Due    COVID-19 Vaccine (1) Never done    Flu vaccine (1 of 2) Never done    HPV vaccine (1 - 2-dose series) 04/18/2028    DTaP/Tdap/Td vaccine (5 - Tdap) 04/18/2028    Meningococcal (ACWY) vaccine (1 - 2-dose series) 04/18/2028    Hepatitis A vaccine  Completed    Hepatitis B vaccine  Completed    Hib vaccine  Completed    Polio vaccine  Completed    Measles,Mumps,Rubella (MMR) vaccine  Completed    Rotavirus vaccine  Completed    Varicella vaccine  Completed    Pneumococcal 0-64 years Vaccine  Completed    Lead screen 3-5  Completed       Subjective:   Review of Systems   Constitutional:  Negative for activity change, appetite change, fatigue and fever. HENT:  Positive for dental problem and facial swelling. Negative for congestion, ear pain, rhinorrhea and sore throat. Eyes:  Negative for photophobia, discharge and redness. Respiratory:  Negative for cough, chest tightness, shortness of breath, wheezing and stridor. Cardiovascular:  Negative for chest pain. Gastrointestinal:  Negative for abdominal distention, abdominal pain, constipation, diarrhea, nausea and vomiting. Endocrine: Negative for polydipsia and polyuria. Genitourinary:  Negative for decreased urine volume, dysuria, frequency, hematuria and urgency. Musculoskeletal:  Negative for myalgias, neck pain and neck stiffness. Skin:  Negative for pallor and rash. Neurological:  Negative for dizziness, weakness and headaches. Hematological:  Negative for adenopathy. Psychiatric/Behavioral:  Negative for behavioral problems. Objective    Physical Exam  Vitals and nursing note reviewed. Constitutional:       General: She is active. Appearance: Normal appearance. She is well-developed. HENT:      Head: Normocephalic and atraumatic. Comments: Significant swelling on right cheek. Right Ear: External ear normal.      Left Ear: External ear normal.      Nose: Nose normal. No congestion or rhinorrhea. Mouth/Throat:      Mouth: Mucous membranes are moist.      Pharynx: Oropharynx is clear. Comments: Right lower molar chipped and mildly decayed. Eyes:      Conjunctiva/sclera: Conjunctivae normal.      Pupils: Pupils are equal, round, and reactive to light. Cardiovascular:      Rate and Rhythm: Normal rate and regular rhythm. Pulses: Normal pulses. Heart sounds: Normal heart sounds. Pulmonary:      Effort: Pulmonary effort is normal. No nasal flaring or retractions. Breath sounds: Normal breath sounds. No stridor. No wheezing. Abdominal:      General: Abdomen is flat.  Bowel sounds are normal. There is no distension. Palpations: Abdomen is soft. Tenderness: There is no abdominal tenderness. Musculoskeletal:         General: No swelling or deformity. Normal range of motion. Cervical back: Normal range of motion. No tenderness. Lymphadenopathy:      Cervical: No cervical adenopathy. Skin:     General: Skin is warm and dry. Coloration: Skin is pale. Skin is not cyanotic. Findings: No rash. Neurological:      Mental Status: She is alert. Psychiatric:         Mood and Affect: Mood normal.         Behavior: Behavior normal.       /70   Pulse 119   Temp 98.8 °F (37.1 °C)   Ht (!) 3.2\" (8.1 cm)   Wt 38 lb (17.2 kg)   SpO2 99%   BMI 2609.07 kg/m²     Assessment         Diagnosis Orders   1. Dental abscess  amoxicillin-clavulanate (AUGMENTIN) 250-62.5 MG/5ML suspension          Plan   Augmentin sent to pharmacy  Advised mother to call dentist first thing in the morning and try to get in sooner. If swelling worsens at all go to ER immediately. Tylenol, Motrin, ice pack. No school tomorrow. If high persistent fevers, increased swelling or pain, or lethargy occurs go to ER. Mother verbalized understanding and agrees to treatment plan. No orders of the defined types were placed in this encounter. No results found for this visit on 10/23/22. Orders Placed This Encounter   Medications    amoxicillin-clavulanate (AUGMENTIN) 250-62.5 MG/5ML suspension     Sig: Take 4.3 mLs by mouth 2 times daily for 10 days     Dispense:  86 mL     Refill:  0      New Prescriptions    AMOXICILLIN-CLAVULANATE (AUGMENTIN) 250-62.5 MG/5ML SUSPENSION    Take 4.3 mLs by mouth 2 times daily for 10 days        No follow-ups on file. Discussed use, benefits, and side effects of any prescribed medications. All patient questions were answered. Patient voiced understanding of care plan. Patient was given educational materials - see patient instructions below.      Patient Instructions Augmentin sent to pharmacy  Advised mother to call dentist first thing in the morning and try to get in sooner. If swelling worsens at all go to ER immediately. Tylenol, Motrin, ice pack. No school tomorrow. If high persistent fevers, increased swelling or pain, or lethargy occurs go to ER. Mother verbalized understanding and agrees to treatment plan.       Electronically signed by MIGUEL Romero CNP on 10/23/2022 at 11:24 AM 89 y/o F with a hx of HTN, HLD, chronic LLE edema, Afib on Eliquis, and a recent PPM placement, who is admitted due to AHRD due to CHF vs pneumonia. She is consulted due to concerns about a recent self-harm episode as per collateral. Patient found awake, alert and oriented to person, time and partially place. She reports feeling well. Says that she did try to hurt herself several months ago, but emphatically denies any SI at the time. Says that she is almost 89 y/o and asks God every night to take her. Says that she has lost most of her friends, has no family left, has too many medical problems and has lived enough. Reports feeling worthless sometimes, but does not answer when asked about sadness, hopelessness or guilt. Denies any anhedonia and denies any difficulty sleeping or decreased appetite. Says that she will never try to hurt herself again, because she cannot be sure anything will succeed.

## 2025-05-12 NOTE — BH CONSULTATION LIAISON ASSESSMENT NOTE - NSBHCONSULTFOLLOWAFTERCARE_PSY_A_CORE FT
[facility]   [address] [place], [place]   [phone]     [facility]  [address] [place] [address] [phone]    [facility]  [address] [place] [address] [phone]

## 2025-05-12 NOTE — CHART NOTE - NSCHARTNOTEFT_GEN_A_CORE
ELECTROPHYSIOLOGY    Device : MARISOL XT SR MRI [id number] (Lyst)  SN#  [id number]  Implant date: 2025  Estimated battery longevity:  13.3 years    Mode: VVI    Lower rate: 60bpm  Leads:    RV (4076)    Sensin.9 mV    Impedance:  342 ohms    Threshold:  0.875 V @ 0.4ms      Measured R Wave:  2.3 mV    Programmed Amplitude / Pulse width:  2.00V @0.4 ms    Counters (Since April 10, 2025)  VS  45.3%    54.7%      Episodes: NONE    Changes made: None    Conclusion: Normal device function.

## 2025-05-12 NOTE — CONSULT NOTE ADULT - PROBLEM SELECTOR RECOMMENDATION 2
p/w chronic SOB  Significant cardiac history with and right sided heart failure with severe pulmonary hypertension noted on TTE      Pt is appropriate for hospice. p/w chronic SOB. Cardiology following  Significant cardiac history Recent TTE 5/10/25  Grade 2 DD; EF= 50% to 55% RV systolic pressure is 69 mm Hg; right sided heart failure with severe pulmonary hypertension noted   Diurese as tolerated      Pt is appropriate for hospice.    DNR/DNI

## 2025-05-12 NOTE — CONSULT NOTE ADULT - SUBJECTIVE AND OBJECTIVE BOX
[facility] Geriatric and Palliative Consult Service:  Heavenly Genesis DO: cell ([phone])  Otilio Mishra MD: cell ([phone])  Brendan Campos NP: cell ([phone])   Jessica Fleischer-Black MD: cell ([phone])  Kamaljit Blackburn LMSW: cell ([phone])       Can contact any Palliative Team member via Microsoft Teams for consults and questions      HPI:  Left voice message for Mckayla Jackson who pt identified as HCP with call back number   89/ F from home, [place], ambulates with a cane/ walker at baseline, AOx 3 with PMHx  HTN, HLD, afib on eliquis , recent PPM placement 3/25 presented to ER with c/o SOB on exertion, subjective fever, chills , non productive cough, chest congestion x 1 week , worsening over past 2 days. Denies chest pain, recent sick contacts, recent travel. Denies nausea, vomiting , constipation , diarrhea.     Of note : as per chart review and ER sign out - Had an echocardiogram outpatient that showed high pulmonary pressures and mitral valve regurgitation with left atrial enlargement.  Also noted possible clot around the pacemaker wire.      In  the ER   VS - / 79, RR20, , afebrile, sats 92% on 2  L NC     EKG - afib not in RVR    Labs s/o Hb 8 ( from 11.4), neutrophilic shift , BUN/ Cr 88/ 1.66 from ( 28/1.26) , lact 3.3, p BNP 2.3 K     CTA PE   1. No evidence of pulmonary embolism.  2. New small to moderate right pleural effusion and small left pleural   effusion with adjacent airspace opacity, favored to represent   atelectasis. Infection is considered less likely, though is possible in   the appropriate clinical setting.  3. Additional chronic and incidental findings are present as detailed   above.    CXR  Cardiomegaly.  Peribronchial haziness in the right lower lobe with small right pleural   effusion, concerning for pneumonia.    S/p lasix 40 , CTX 2 g  (09 May 2025 23:45)      Interval hx: Pt is alert and oriented x3, no reports of pain, SOB, or any distress.      PAST MEDICAL & SURGICAL HISTORY:  Atrial fibrillation      Renal insufficiency      HTN (hypertension)      Breast cancer      Colon cancer      Pacemaker      HLD (hyperlipidemia)      Cardiac pacemaker      S/P peripheral artery bypass  in 1994      S/P mastectomy  1990      S/P colon resection  1993 for colon ca.      S/P hysterectomy      S/P cataract surgery      Cardiac pacemaker          SOCIAL HISTORY:    Admitted from:  home  alone  Pt   [ none ] Substance abuse, [ none ] Tobacco hx, [ none ] Alcohol hx, [ none ] Home Opioid Hx    FAMILY HISTORY:  FH: smoking  sister     unable to obtain from patient due to poor mentation, family unable to give information, see H&P for history  Baseline ADLs (prior to admission):    Allergies    No Known Allergies    Intolerances      Present Symptoms: Mild, Moderate, Severe  Pain:             Location -                               Aggravating factors -             Quality -             Radiation -             Timing-             Severity (0-10 scale):             Minimal acceptable level (0-10 scale):  Fatigue:  Nausea:  Lack of Appetite:   SOB:  Depression:  Anxiety:  Review of Systems: [All others negative or Unable to obtain due to poor mentation]    CPOT:    https://www.[facility].org/getattachment/[email or web address]/Critical-Care-Pain-Observation-Tool-(CPOT)  PAIN AD Score:   http://geriatrictoolkit.[email or web address]/cog/painad.pdf (press ctrl +  left click to view)      MEDICATIONS  (STANDING):  atorvastatin 40 milliGRAM(s) Oral at bedtime  metoprolol succinate ER 25 milliGRAM(s) Oral daily    MEDICATIONS  (PRN):  acetaminophen     Tablet .. 650 milliGRAM(s) Oral every 6 hours PRN Temp greater or equal to 38C (100.4F), Mild Pain (1 - 3)  benzocaine/menthol Lozenge 1 Lozenge Oral every 6 hours PRN cough  guaiFENesin Oral Liquid (Sugar-Free) 200 milliGRAM(s) Oral every 6 hours PRN Cough      PHYSICAL EXAM:  Vital Signs Last 24 Hrs  T(C): 36.5 (12 May 2025 11:04), Max: 37 (11 May 2025 22:30)  T(F): 97.7 (12 May 2025 11:04), Max: 98.6 (11 May 2025 22:30)  HR: 62 (12 May 2025 11:04) (53 - 72)  BP: 129/57 (12 May 2025 11:04) (126/62 - 159/62)  BP(mean): 86 (12 May 2025 07:37) (86 - 86)  RR: 18 (12 May 2025 11:04) (18 - 18)  SpO2: 97% (12 May 2025 11:04) (96% - 100%)    Parameters below as of 12 May 2025 11:04  Patient On (Oxygen Delivery Method): nasal cannula  O2 Flow (L/min): 2      General: alert  oriented x ____    lethargic distressed cachexia  nonverbal  unarousable verbal    Palliative Performance Scale/Karnofsky Score:  http://npcrc.org/files/news/palliative_performance_scale_ppsv2.pdf    HEENT: no abnormal lesion, dry mouth  ET tube/trach oral lesions:  Lungs: tachypnea/labored breathing, audible excessive secretions  CV: RRR, S1S2, tachycardia  GI: soft non distended non tender  incontinent               PEG/NG/OG tube  constipation  last BM:   : incontinent  oliguria/anuria  fraire  Musculoskeletal: weakness x4 edema x4    ambulatory with assistance   mostly/fully bedbound/wheelchair bound  Skin: no abnormal skin lesions, poor skin turgor, pressure ulcer stage:   Neuro: no deficits, mild cognitive impairment dsyphagia/dysarthria paresis  Oral intake ability: unable/only mouth care, minimal moderate full capability    LABS:                        7.7    7.89  )-----------( 176      ( 12 May 2025 10:20 )             23.0     05-12    144  |  114[H]  |  66[H]  ----------------------------<  138[H]  4.2   |  23  |  1.63[H]    Ca    8.8      12 May 2025 10:20  Phos  3.7     05-12  Mg     2.1     05-12    TPro  5.8[L]  /  Alb  2.9[L]  /  TBili  0.4  /  DBili  x   /  AST  16  /  ALT  22  /  AlkPhos  53  05-11    Urinalysis Basic - ( 12 May 2025 10:20 )    Color: x / Appearance: x / SG: x / pH: x  Gluc: 138 mg/dL / Ketone: x  / Bili: x / Urobili: x   Blood: x / Protein: x / Nitrite: x   Leuk Esterase: x / RBC: x / WBC x   Sq Epi: x / Non Sq Epi: x / Bacteria: x        RADIOLOGY & ADDITIONAL STUDIES:         [facility] Geriatric and Palliative Consult Service:  Heavenly Genesis DO: cell ([phone])  Otilio Mishra MD: cell ([phone])  Brendan Campos NP: cell ([phone])   Jessica Fleischer-Black MD: cell ([phone])  Kamaljit Blackburn LMSW: cell ([phone])       Can contact any Palliative Team member via Microsoft Teams for consults and questions      HPI:  Left voice message for Mckayla Jackson who pt identified as HCP with call back number   89/ F from home, [place], ambulates with a cane/ walker at baseline, AOx 3 with PMHx  HTN, HLD, afib on eliquis , recent PPM placement 3/25 presented to ER with c/o SOB on exertion, subjective fever, chills , non productive cough, chest congestion x 1 week , worsening over past 2 days. Denies chest pain, recent sick contacts, recent travel. Denies nausea, vomiting , constipation , diarrhea.     Of note : as per chart review and ER sign out - Had an echocardiogram outpatient that showed high pulmonary pressures and mitral valve regurgitation with left atrial enlargement.  Also noted possible clot around the pacemaker wire.      In  the ER   VS - / 79, RR20, , afebrile, sats 92% on 2  L NC     EKG - afib not in RVR    Labs s/o Hb 8 ( from 11.4), neutrophilic shift , BUN/ Cr 88/ 1.66 from ( 28/1.26) , lact 3.3, p BNP 2.3 K     CTA PE   1. No evidence of pulmonary embolism.  2. New small to moderate right pleural effusion and small left pleural   effusion with adjacent airspace opacity, favored to represent   atelectasis. Infection is considered less likely, though is possible in   the appropriate clinical setting.  3. Additional chronic and incidental findings are present as detailed   above.    CXR  Cardiomegaly.  Peribronchial haziness in the right lower lobe with small right pleural   effusion, concerning for pneumonia.    S/p lasix 40 , CTX 2 g  (09 May 2025 23:45)      Interval hx: Pt is alert and oriented x3, no reports of pain, SOB, or any distress.  HCP Mckayla at the bedside.     PAST MEDICAL & SURGICAL HISTORY:  Atrial fibrillation      Renal insufficiency      HTN (hypertension)      Breast cancer      Colon cancer      Pacemaker      HLD (hyperlipidemia)      Cardiac pacemaker      S/P peripheral artery bypass  in 1994      S/P mastectomy  1990      S/P colon resection  1993 for colon ca.      S/P hysterectomy      S/P cataract surgery      Cardiac pacemaker          SOCIAL HISTORY:    Admitted from:  home  alone  Pt's friend Mckayla her assigned HCP  [ none ] Substance abuse, [ none ] Tobacco hx, [ none ] Alcohol hx, [ none ] Home Opioid Hx    Pentecostalism: Fabi Nair  (HCP)              Phone# [phone]  Esmer Gaona    (friend)   Phone# [phone]    FAMILY HISTORY:  FH: smoking  sister     unable to obtain from patient due to poor mentation, family unable to give information, see H&P for history  Baseline ADLs (prior to admission):    Allergies    No Known Allergies    Intolerances      Present Symptoms: Mild, Moderate, Severe  Pain: denies             Location -                               Aggravating factors -             Quality -             Radiation -             Timing-             Severity (0-10 scale):             Minimal acceptable level (0-10 scale):  Fatigue: denies  Nausea: denies  Lack of Appetite: denies  SOB: denies  Depression: mild  Anxiety: denies  Review of Systems: [All others negative     CPOT:    https://www.[facility].org/getattachment/[email or web address]/Critical-Care-Pain-Observation-Tool-(CPOT)  PAIN AD Score:   http://geriatrictoolkit.[email or web address]/cog/painad.pdf (press ctrl +  left click to view)      MEDICATIONS  (STANDING):  atorvastatin 40 milliGRAM(s) Oral at bedtime  metoprolol succinate ER 25 milliGRAM(s) Oral daily    MEDICATIONS  (PRN):  acetaminophen     Tablet .. 650 milliGRAM(s) Oral every 6 hours PRN Temp greater or equal to 38C (100.4F), Mild Pain (1 - 3)  benzocaine/menthol Lozenge 1 Lozenge Oral every 6 hours PRN cough  guaiFENesin Oral Liquid (Sugar-Free) 200 milliGRAM(s) Oral every 6 hours PRN Cough      PHYSICAL EXAM:  Vital Signs Last 24 Hrs  T(C): 36.5 (12 May 2025 11:04), Max: 37 (11 May 2025 22:30)  T(F): 97.7 (12 May 2025 11:04), Max: 98.6 (11 May 2025 22:30)  HR: 62 (12 May 2025 11:04) (53 - 72)  BP: 129/57 (12 May 2025 11:04) (126/62 - 159/62)  BP(mean): 86 (12 May 2025 07:37) (86 - 86)  RR: 18 (12 May 2025 11:04) (18 - 18)  SpO2: 97% (12 May 2025 11:04) (96% - 100%)    Parameters below as of 12 May 2025 11:04  Patient On (Oxygen Delivery Method): nasal cannula  O2 Flow (L/min): 2      General: Pt is AOX3, resp stable on NC.     Palliative Performance Scale/Karnofsky Score: 30%  http://npcrc.org/files/news/palliative_performance_scale_ppsv2.pdf    HEENT: no abnormal lesion, [place], moist mm, neck supple  Lungs: mild dyspnea, on NC  CV: RRR, S1S2  GI: soft non distended non tender  incontinent            last BM: non documented  : [place]  Musculoskeletal: weakness x4, bedbound, b/l  MAYA  Skin: no abnormal skin lesions, poor skin turgor  Neuro: no deficits, able ot follow commands  Oral intake ability: full capability    LABS:                        7.7    7.89  )-----------( 176      ( 12 May 2025 10:20 )             23.0     05-12    144  |  114[H]  |  66[H]  ----------------------------<  138[H]  4.2   |  23  |  1.63[H]    Ca    8.8      12 May 2025 10:20  Phos  3.7     05-12  Mg     2.1     05-12    TPro  5.8[L]  /  Alb  2.9[L]  /  TBili  0.4  /  DBili  x   /  AST  16  /  ALT  22  /  AlkPhos  53  05-11    Urinalysis Basic - ( 12 May 2025 10:20 )    Color: x / Appearance: x / SG: x / pH: x  Gluc: 138 mg/dL / Ketone: x  / Bili: x / Urobili: x   Blood: x / Protein: x / Nitrite: x   Leuk Esterase: x / RBC: x / WBC x   Sq Epi: x / Non Sq Epi: x / Bacteria: x    < from: CT Abdomen and Pelvis No Cont (05.11.25 @ 18:36) >  ACC: [id number] EXAM:  CT ABDOMEN AND PELVIS   ORDERED BY: PHANI HOFF     PROCEDURE DATE:  05/11/2025          INTERPRETATION:  CLINICAL INFORMATION: Evaluate for hematoma.    COMPARISON: CT abdomen and pelvis dated 8/5/2019 and chestCT dated   5/9/2025.    CONTRAST/COMPLICATIONS:  IV Contrast: NONE  Oral Contrast: NONE    PROCEDURE:  CT of the Abdomen and Pelvis was performed.  Sagittal and coronal reformats were performed.    FINDINGS:  Evaluation of the solid organs and vasculature is somewhat limited due to   lack of intravenous contrast.    LOWER CHEST: Small moderate right and small left pleural effusions,   slightly increase in size from 5/9/2025. There is subtle dependent high   attenuation within the right pleural effusion (2-1) incompletely included   in the field-of-view and therefore limited in evaluation. Cardiomegaly   with coronary artery calcifications and mitral valve annular   calcifications.    LIVER: Within normal limits.  BILE DUCTS: Normal caliber.  GALLBLADDER: Mildly thick-walled, though underdistended.  SPLEEN: Within normal limits.  PANCREAS: Within normal limits.  ADRENALS: Similar size of a 1.5 x 2.4 cm right adrenal adenoma (2-33) and   C5 2018, requiring no further evaluation or follow-up. Mildly thickened   left adrenal gland, also chronic.  KIDNEYS/URETERS: There is a 1.4 cm exophytic posterior right upper pole   renal lesion measuring 69 Hounsfield units (2-44), previously a simple   cyst on 8/5/2018 and therefore favored representinterval development of   a hemorrhagic or proteinaceous debris. There is also a 1.5 cm hyperdense   right upper pole cyst (2-38) measuring 86 Hounsfield units in density.   There are a few additional probable bilateral renal cysts. There are   vascular calcifications within the bilateral renal sinus, limiting   evaluation for calculi. No hydronephrosis. There appears to be some   retained contrast within the renal cortex.    BLADDER: Excreted contrast is present within the urinary bladder.  REPRODUCTIVE ORGANS: Status post hysterectomy.    BOWEL: No bowel obstruction. Appendix is not definitely identified. A   left lower quadrant colonic anastomosis is present without evidence of   obstruction.  PERITONEUM/RETROPERITONEUM: Within normal limits.  VESSELS: Extensive calcified atherosclerosis of the abdominal aorta,   which is also tortuous. There are 2 femoral-femoral bypass grafts. No   aortic aneurysm.  LYMPH NODES: No lymphadenopathy.  ABDOMINAL WALL: Within normal limits.  BONES: The bones are markedly osteopenic and there are advanced   degenerative changes of the spine with partial, stricture of the mid   lumbar spine. Old healed left-sided rib fracture deformities are present.    IMPRESSION:  1. No evidence of intra-abdominal hematoma.  2. Probable subtle retained renal cortical contrast, suggestive of acute   tubular necrosis (ATN).  3. Slight increase in size of the moderate right and small left pleural   effusions with adjacent atelectasis.  4. There is new trace high attenuation within the upper aspect of the   right pleural effusion, which is nonspecific.    < end of copied text >      RADIOLOGY & ADDITIONAL STUDIES: reviewed  ADVANCED DIRECTIVES: DNR/DNI/no PEG         [facility] Geriatric and Palliative Consult Service:  Heavenly Genesis DO: cell ([phone])  Otilio Mishra MD: cell ([phone])  Brendan Campos NP: cell ([phone])   Jessica Fleischer-Black MD: cell ([phone])  Kamaljit Blackburn LMSW: cell ([phone])       Can contact any Palliative Team member via Microsoft Teams for consults and questions      HPI:  Left voice message for Mckayla Jackson who pt identified as HCP with call back number   89/ F from home, [place], ambulates with a cane/ walker at baseline, AOx 3 with PMHx  HTN, HLD, afib on eliquis , recent PPM placement 3/25 presented to ER with c/o SOB on exertion, subjective fever, chills , non productive cough, chest congestion x 1 week , worsening over past 2 days. Denies chest pain, recent sick contacts, recent travel. Denies nausea, vomiting , constipation , diarrhea.     Of note : as per chart review and ER sign out - Had an echocardiogram outpatient that showed high pulmonary pressures and mitral valve regurgitation with left atrial enlargement.  Also noted possible clot around the pacemaker wire.      In  the ER   VS - / 79, RR20, , afebrile, sats 92% on 2  L NC     EKG - afib not in RVR    Labs s/o Hb 8 ( from 11.4), neutrophilic shift , BUN/ Cr 88/ 1.66 from ( 28/1.26) , lact 3.3, p BNP 2.3 K     CTA PE   1. No evidence of pulmonary embolism.  2. New small to moderate right pleural effusion and small left pleural   effusion with adjacent airspace opacity, favored to represent   atelectasis. Infection is considered less likely, though is possible in   the appropriate clinical setting.  3. Additional chronic and incidental findings are present as detailed   above.    CXR  Cardiomegaly.  Peribronchial haziness in the right lower lobe with small right pleural   effusion, concerning for pneumonia.    S/p lasix 40 , CTX 2 g  (09 May 2025 23:45)      Interval hx: Pt is alert and oriented x3, no reports of pain, SOB, or any distress.  HCP Mckayla at the bedside.     PAST MEDICAL & SURGICAL HISTORY:  Atrial fibrillation      Renal insufficiency      HTN (hypertension)      Breast cancer      Colon cancer      Pacemaker      HLD (hyperlipidemia)      Cardiac pacemaker      S/P peripheral artery bypass  in 1994      S/P mastectomy  1990      S/P colon resection  1993 for colon ca.      S/P hysterectomy      S/P cataract surgery      Cardiac pacemaker          SOCIAL HISTORY:    Admitted from:  home  alone  Pt's friend Mckayla her assigned HCP  [ none ] Substance abuse, [ none ] Tobacco hx, [ none ] Alcohol hx, [ none ] Home Opioid Hx    Spiritism: Fabi Nair  (HCP)              Phone# [phone]  Esmer Gaona    (friend)   Phone# [phone]    FAMILY HISTORY:  FH: smoking  sister     unable to obtain from patient due to poor mentation, family unable to give information, see H&P for history  Baseline ADLs (prior to admission):    Allergies    No Known Allergies    Intolerances      Present Symptoms: Mild, Moderate, Severe  Pain: denies             Location -                               Aggravating factors -             Quality -             Radiation -             Timing-             Severity (0-10 scale):             Minimal acceptable level (0-10 scale):  Fatigue: denies  Nausea: denies  Lack of Appetite: denies  SOB: denies  Depression: mild  Anxiety: denies  Review of Systems: [All others negative     CPOT:    https://www.[facility].org/getattachment/[email or web address]/Critical-Care-Pain-Observation-Tool-(CPOT)  PAIN AD Score:   http://geriatrictoolkit.[facility]/cog/painad.pdf (press ctrl +  left click to view)      MEDICATIONS  (STANDING):  atorvastatin 40 milliGRAM(s) Oral at bedtime  metoprolol succinate ER 25 milliGRAM(s) Oral daily    MEDICATIONS  (PRN):  acetaminophen     Tablet .. 650 milliGRAM(s) Oral every 6 hours PRN Temp greater or equal to 38C (100.4F), Mild Pain (1 - 3)  benzocaine/menthol Lozenge 1 Lozenge Oral every 6 hours PRN cough  guaiFENesin Oral Liquid (Sugar-Free) 200 milliGRAM(s) Oral every 6 hours PRN Cough      PHYSICAL EXAM:  Vital Signs Last 24 Hrs  T(C): 36.5 (12 May 2025 11:04), Max: 37 (11 May 2025 22:30)  T(F): 97.7 (12 May 2025 11:04), Max: 98.6 (11 May 2025 22:30)  HR: 62 (12 May 2025 11:04) (53 - 72)  BP: 129/57 (12 May 2025 11:04) (126/62 - 159/62)  BP(mean): 86 (12 May 2025 07:37) (86 - 86)  RR: 18 (12 May 2025 11:04) (18 - 18)  SpO2: 97% (12 May 2025 11:04) (96% - 100%)    Parameters below as of 12 May 2025 11:04  Patient On (Oxygen Delivery Method): nasal cannula  O2 Flow (L/min): 2      General: Pt is AOX3, resp stable on NC.     Palliative Performance Scale/Karnofsky Score: 30%  http://npcrc.org/files/news/palliative_performance_scale_ppsv2.pdf    HEENT: no abnormal lesion, [place], moist mm, neck supple  Lungs: mild dyspnea, on NC  CV: RRR, S1S2  GI: soft non distended non tender  incontinent            last BM: non documented  : [place]  Musculoskeletal: weakness x4, bedbound, b/l  MAYA  Skin: no abnormal skin lesions, poor skin turgor  Neuro: no deficits, able ot follow commands  Oral intake ability: full capability    LABS:                        7.7    7.89  )-----------( 176      ( 12 May 2025 10:20 )             23.0     05-12    144  |  114[H]  |  66[H]  ----------------------------<  138[H]  4.2   |  23  |  1.63[H]    Ca    8.8      12 May 2025 10:20  Phos  3.7     05-12  Mg     2.1     05-12    TPro  5.8[L]  /  Alb  2.9[L]  /  TBili  0.4  /  DBili  x   /  AST  16  /  ALT  22  /  AlkPhos  53  05-11    Urinalysis Basic - ( 12 May 2025 10:20 )    Color: x / Appearance: x / SG: x / pH: x  Gluc: 138 mg/dL / Ketone: x  / Bili: x / Urobili: x   Blood: x / Protein: x / Nitrite: x   Leuk Esterase: x / RBC: x / WBC x   Sq Epi: x / Non Sq Epi: x / Bacteria: x    < from: CT Abdomen and Pelvis No Cont (05.11.25 @ 18:36) >  ACC: [id number] EXAM:  CT ABDOMEN AND PELVIS   ORDERED BY: PHANI HOFF     PROCEDURE DATE:  05/11/2025          INTERPRETATION:  CLINICAL INFORMATION: Evaluate for hematoma.    COMPARISON: CT abdomen and pelvis dated 8/5/2019 and chestCT dated   5/9/2025.    CONTRAST/COMPLICATIONS:  IV Contrast: NONE  Oral Contrast: NONE    PROCEDURE:  CT of the Abdomen and Pelvis was performed.  Sagittal and coronal reformats were performed.    FINDINGS:  Evaluation of the solid organs and vasculature is somewhat limited due to   lack of intravenous contrast.    LOWER CHEST: Small moderate right and small left pleural effusions,   slightly increase in size from 5/9/2025. There is subtle dependent high   attenuation within the right pleural effusion (2-1) incompletely included   in the field-of-view and therefore limited in evaluation. Cardiomegaly   with coronary artery calcifications and mitral valve annular   calcifications.    LIVER: Within normal limits.  BILE DUCTS: Normal caliber.  GALLBLADDER: Mildly thick-walled, though underdistended.  SPLEEN: Within normal limits.  PANCREAS: Within normal limits.  ADRENALS: Similar size of a 1.5 x 2.4 cm right adrenal adenoma (2-33) and   C5 2018, requiring no further evaluation or follow-up. Mildly thickened   left adrenal gland, also chronic.  KIDNEYS/URETERS: There is a 1.4 cm exophytic posterior right upper pole   renal lesion measuring 69 Hounsfield units (2-44), previously a simple   cyst on 8/5/2018 and therefore favored representinterval development of   a hemorrhagic or proteinaceous debris. There is also a 1.5 cm hyperdense   right upper pole cyst (2-38) measuring 86 Hounsfield units in density.   There are a few additional probable bilateral renal cysts. There are   vascular calcifications within the bilateral renal sinus, limiting   evaluation for calculi. No hydronephrosis. There appears to be some   retained contrast within the renal cortex.    BLADDER: Excreted contrast is present within the urinary bladder.  REPRODUCTIVE ORGANS: Status post hysterectomy.    BOWEL: No bowel obstruction. Appendix is not definitely identified. A   left lower quadrant colonic anastomosis is present without evidence of   obstruction.  PERITONEUM/RETROPERITONEUM: Within normal limits.  VESSELS: Extensive calcified atherosclerosis of the abdominal aorta,   which is also tortuous. There are 2 femoral-femoral bypass grafts. No   aortic aneurysm.  LYMPH NODES: No lymphadenopathy.  ABDOMINAL WALL: Within normal limits.  BONES: The bones are markedly osteopenic and there are advanced   degenerative changes of the spine with partial, stricture of the mid   lumbar spine. Old healed left-sided rib fracture deformities are present.    IMPRESSION:  1. No evidence of intra-abdominal hematoma.  2. Probable subtle retained renal cortical contrast, suggestive of acute   tubular necrosis (ATN).  3. Slight increase in size of the moderate right and small left pleural   effusions with adjacent atelectasis.  4. There is new trace high attenuation within the upper aspect of the   right pleural effusion, which is nonspecific.    < end of copied text >    < from: TTE W or WO Ultrasound Enhancing Agent (05.10.25 @ 10:41) >  TRANSTHORACIC ECHOCARDIOGRAM REPORT  ________________________________________________________________________________                                      _______       Pt. Name:       ROGELIO DURBIN Study Date:    5/10/2025  MRN:            [id number]    YOB: 1928  Accession #:    [id number]    Age:           89 years  Account#:       [id number]   Gender:        F  Heart Rate:                  Height:        60.00 in (152.40 cm)  Rhythm:                      Weight:        120.00 lb (54.43 kg)  Blood Pressure: 133/83 mmHg  BSA/BMI:       1.50 m² / 23.44 kg/m²  ________________________________________________________________________________________  Referring Physician:    [id number] Carolann Drake  Interpreting Physician: Ana Rosa Wilburn MD  Primary Sonographer:    Saleem Bonner    CPT:               ECHO TTE WO CON COMP W DOPP - 80748.m  Indication(s):     Primary pulmonary hypertension - I27.0  Procedure:         Transthoracic echocardiogram with 2-D, M-mode and complete                     spectral and color flow Doppler.  Ordering Location: [facility]  Admission Status:  Inpatient  Study Information: Image quality for this study is adequate.    _______________________________________________________________________________________     CONCLUSIONS:      1. Left ventricular cavity is small. Left ventricular systolic function is normal with an ejection fraction visually estimated at 50 to 55 %. There are no regional wall motion abnormalities seen.D shaped septum indicative of RV pressure/volume overload.   2. There is increased LV mass and concentric hypertrophy.   3. Mild left ventricular hypertrophy.   4. There is moderate (grade 2) left ventricular diastolic dysfunction.   5. Based on visual assessment, the right ventricle appears mildly enlarged. normal wall thickness, and reduced right ventricular systolic function. Tricuspid annular plane systolic excursion (TAPSE) is 1.5 cm (normal >=1.7 cm).   6. Left atrium is severely dilated.   7. The right atrium is severely dilated.   8. Moderate mitral regurgitation.   9. Normal left and right atrial size.  10. Moderate tricuspid regurgitation.  11. Moderate pulmonic regurgitation.  12. Estimated pulmonary artery systolic pressure is 69 mmHg, consistent with elevated pulmonary artery pressure.  13. The inferior vena cava is normal in size measuring 1.80 cm in diameter, (normal <2.1cm) with abnormal inspiratory collapse (abnormal <50%) consistent with mildly elevated right atrial pressure (~8, range 5-10mmHg).  14. No pericardial effusion seen.  15. No prior echocardiogram is available for comparison.    ________________________________________________________________________________________  FINDINGS:     Left Ventricle:  The left ventricular cavity is small. Left ventricular systolic function is normal with an ejection fraction visually estimated at 50 to 55%. There are no regional wall motion abnormalities seen. Mild left ventricular hypertrophy. There is increased LV mass and concentric hypertrophy. There is moderate (grade 2) left ventricular diastolic dysfunction. D shaped septum indicative of RV pressure/volume overload.     Right Ventricle:  Based on visual assessment, the right ventricle appears mildly enlarged. Normal wall thickness and right ventricular systolic function is reduced. Tricuspid annular plane systolic excursion (TAPSE) is 1.5 cm (normal >=1.7 cm).     Left Atrium:  The left atrium is severely dilated.     Right Atrium:  The right atrium is severely dilated.     Interatrial Septum:  The interatrial septum appears intact.     Aortic Valve:  The aortic valve is tricuspid with normal leaflet excursion. There is no aortic valve stenosis. There is no evidence of aortic regurgitation.     Mitral Valve:  There is calcification of the mitral valve annulus. There is moderate leaflet calcification. There is no mitral valve stenosis. There is moderate mitral regurgitation.     Tricuspid Valve:  Structurally normal tricuspid valve with normal leaflet excursion. There is moderate tricuspid regurgitation. Estimated pulmonary artery systolic pressure is 69 mmHg, consistent with elevated pulmonary artery pressure.     Pulmonic Valve:  Structurally normal pulmonic valve with normal leaflet excursion. There is moderate pulmonic regurgitation.     Aorta:  The aortic root appears normal in size.     Pericardium:  No pericardial effusion seen.     Systemic Veins:  The inferior vena cava is normal in size measuring 1.80 cm in diameter, (normal <2.1cm) with abnormal inspiratory collapse (abnormal <50%) consistent with mildly elevated right atrial pressure (~8, range 5-10mmHg).  ____________________________________________________________________    < end of copied text >    RADIOLOGY & ADDITIONAL STUDIES: reviewed  ADVANCED DIRECTIVES: DNR/DNI/no PEG

## 2025-05-12 NOTE — PROGRESS NOTE ADULT - ASSESSMENT
89/ F from home, [place], ambulates with a cane/ walker at baseline, AOx 3 with PMHx  HTN, HLD, chronic LLE edema , afib on eliquis , recent PPM placement 3/25 presented to ER with c/o SOB on exertion, subjective fever, chills , non productive cough, chest congestion x 1 week. Labs s/o Hb 8 ( from 11.4), neutrophilic shift , BUN/ Cr 88/ 1.66 from ( 28/1.26) , lact 3.3, p BNP 2.3 K . Imaging c/w BL pleural effusions ? R sided infiltrate . S/p lasix 40 , CTX 2 g . Admitted to medicine for AHRF 2/2 CHF vs PNA

## 2025-05-12 NOTE — PROGRESS NOTE ADULT - SUBJECTIVE AND OBJECTIVE BOX
PRESENTING CC:    OVERNIGHT EVENTS:    SUBJECTIVE:         ------------------------  PMH:   PAST MEDICAL & SURGICAL HISTORY:  Atrial fibrillation    Renal insufficiency    HTN (hypertension)    Breast cancer    Colon cancer    Pacemaker    HLD (hyperlipidemia)    Cardiac pacemaker    S/P peripheral artery bypass  in 1994    S/P mastectomy  1990    S/P colon resection  1993 for colon ca.    S/P hysterectomy    S/P cataract surgery    Cardiac pacemaker        Allergies    No Known Allergies    Intolerances        MEDICATIONS  (STANDING):  atorvastatin 40 milliGRAM(s) Oral at bedtime  metoprolol succinate ER 25 milliGRAM(s) Oral daily    MEDICATIONS  (PRN):  acetaminophen     Tablet .. 650 milliGRAM(s) Oral every 6 hours PRN Temp greater or equal to 38C (100.4F), Mild Pain (1 - 3)  benzocaine/menthol Lozenge 1 Lozenge Oral every 6 hours PRN cough  guaiFENesin Oral Liquid (Sugar-Free) 200 milliGRAM(s) Oral every 6 hours PRN Cough      FAMILY HISTORY:  FH: smoking  sister      Reviewed; no change from my prior note    SOCIAL HISTORY:  Reviewed, no change from my prior note    REVIEW OF SYSTEMS:  Constitutional: [ ] fever, [ ]weight loss,  [ ]fatigue  Eyes: [ ] visual changes  Respiratory: [ ]shortness of breath;  [ ] cough, [ ]wheezing, [ ]chills, [ ]hemoptysis  Cardiovascular: [ ] chest pain, [ ]palpitations, [ ]dizziness,  [ ]leg swelling [ ]syncope  Gastrointestinal: [ ] abdominal pain, [ ]nausea, [ ]vomiting,  [ ]diarrhea   Genitourinary: [ ] dysuria, [ ] hematuria  Neurologic: [ ] headaches [ ] tremors [ ] weakness [ ] lightheadedness  Skin: [ ] itching, [ ]burning, [ ] rashes  Endocrine: [ ] heat or cold intolerance  Musculoskeletal: [ ] joint pain or swelling; [ ] muscle, back, or extremity pain  Psychiatric: [ ] depression, [ ]anxiety, [ ]mood swings, or [ ]difficulty sleeping  Hematologic: [ ] easy bruising, [ ] bleeding gums    [x] All remaining systems negative except as per above.   [  ] Unable to obtain    Vital Signs Last 24 Hrs  T(C): 36.6 (12 May 2025 15:28), Max: 37 (11 May 2025 22:30)  T(F): 97.9 (12 May 2025 15:28), Max: 98.6 (11 May 2025 22:30)  HR: 61 (12 May 2025 15:28) (53 - 72)  BP: 137/57 (12 May 2025 15:28) (126/62 - 159/62)  BP(mean): 86 (12 May 2025 07:37) (86 - 86)  RR: 18 (12 May 2025 15:28) (18 - 18)  SpO2: 96% (12 May 2025 15:28) (96% - 100%)    Parameters below as of 12 May 2025 15:28  Patient On (Oxygen Delivery Method): nasal cannula  O2 Flow (L/min): 2    I&O's Summary    11 May 2025 07:01  -  12 May 2025 07:00  --------------------------------------------------------  IN: 0 mL / OUT: 900 mL / NET: -900 mL    12 May 2025 07:01  -  12 May 2025 17:22  --------------------------------------------------------  IN: 0 mL / OUT: 700 mL / NET: -700 mL        PHYSICAL EXAM:  General: No acute distress  HEENT: EOMI  Neck: No JVD  Lungs: Clear to auscultation bilaterally; No rales or wheezing  Heart: Regular rate and rhythm; No murmurs, rubs, or gallops  Abdomen: Soft, non tender, non distended   Extremities: Warm, no edema   Nervous system:  Alert & Oriented X3  Psychiatric: Normal affect  Skin: No rashes or lesions    LABS:  05-12    144  |  114[H]  |  66[H]  ----------------------------<  138[H]  4.2   |  23  |  1.63[H]    Ca    8.8      12 May 2025 10:20  Phos  3.7     05-12  Mg     2.1     05-12    TPro  5.8[L]  /  Alb  2.9[L]  /  TBili  0.4  /  DBili  x   /  AST  16  /  ALT  22  /  AlkPhos  53  05-11    Creatinine Trend: 1.63<--, 1.71<--, 1.53<--, 1.66<--                        7.7    7.89  )-----------( 176      ( 12 May 2025 10:20 )             23.0       Lipid Panel:   Cardiac Enzymes:         RADIOLOGY:    ECG [my interpretation]:    TELEMETRY:    ECHO:    STRESS TEST:    CATHETERIZATION:               PRESENTING CC: difficulty breathing      OVERNIGHT EVENTS: No new events overnight    PMH:   PAST MEDICAL & SURGICAL HISTORY:  Atrial fibrillation    Renal insufficiency    HTN (hypertension)    Breast cancer    Colon cancer    Pacemaker    HLD (hyperlipidemia)    Cardiac pacemaker    S/P peripheral artery bypass  in 1994    S/P mastectomy  1990    S/P colon resection  1993 for colon ca.    S/P hysterectomy    S/P cataract surgery    Cardiac pacemaker        Allergies    No Known Allergies    Intolerances        MEDICATIONS  (STANDING):  atorvastatin 40 milliGRAM(s) Oral at bedtime  metoprolol succinate ER 25 milliGRAM(s) Oral daily    MEDICATIONS  (PRN):  acetaminophen     Tablet .. 650 milliGRAM(s) Oral every 6 hours PRN Temp greater or equal to 38C (100.4F), Mild Pain (1 - 3)  benzocaine/menthol Lozenge 1 Lozenge Oral every 6 hours PRN cough  guaiFENesin Oral Liquid (Sugar-Free) 200 milliGRAM(s) Oral every 6 hours PRN Cough      FAMILY HISTORY:  FH: smoking  sister      Reviewed; no change from my prior note    SOCIAL HISTORY:  Reviewed, no change from my prior note    REVIEW OF SYSTEMS:  Constitutional: [ ] fever, [ ]weight loss,  [ ]fatigue  Eyes: [ ] visual changes  Respiratory: [ ]shortness of breath;  [ ] cough, [ ]wheezing, [ ]chills, [ ]hemoptysis  Cardiovascular: [ ] chest pain, [ ]palpitations, [ ]dizziness,  [ ]leg swelling [ ]syncope  Gastrointestinal: [ ] abdominal pain, [ ]nausea, [ ]vomiting,  [ ]diarrhea   Genitourinary: [ ] dysuria, [ ] hematuria  Neurologic: [ ] headaches [ ] tremors [ ] weakness [ ] lightheadedness  Skin: [ ] itching, [ ]burning, [ ] rashes  Endocrine: [ ] heat or cold intolerance  Musculoskeletal: [ ] joint pain or swelling; [ ] muscle, back, or extremity pain  Psychiatric: [ ] depression, [ ]anxiety, [ ]mood swings, or [ ]difficulty sleeping  Hematologic: [ ] easy bruising, [ ] bleeding gums    [x] All remaining systems negative except as per above.   [  ] Unable to obtain    Vital Signs Last 24 Hrs  T(C): 36.6 (12 May 2025 15:28), Max: 37 (11 May 2025 22:30)  T(F): 97.9 (12 May 2025 15:28), Max: 98.6 (11 May 2025 22:30)  HR: 61 (12 May 2025 15:28) (53 - 72)  BP: 137/57 (12 May 2025 15:28) (126/62 - 159/62)  BP(mean): 86 (12 May 2025 07:37) (86 - 86)  RR: 18 (12 May 2025 15:28) (18 - 18)  SpO2: 96% (12 May 2025 15:28) (96% - 100%)    Parameters below as of 12 May 2025 15:28  Patient On (Oxygen Delivery Method): nasal cannula  O2 Flow (L/min): 2    I&O's Summary    11 May 2025 07:01  -  12 May 2025 07:00  --------------------------------------------------------  IN: 0 mL / OUT: 900 mL / NET: -900 mL    12 May 2025 07:01  -  12 May 2025 17:22  --------------------------------------------------------  IN: 0 mL / OUT: 700 mL / NET: -700 mL        PHYSICAL EXAM:  General: No acute distress  HEENT: EOMI  Neck: No JVD  Lungs: Diminished to auscultation bilaterally; No rales or wheezing  Heart: Irregular rate and rhythm; No murmurs, rubs, or gallops  Abdomen: Soft, non tender, non distended   Extremities: Warm, +LLE edema   Nervous system:  Alert & Oriented X3  Psychiatric: Normal affect  Skin: No rashes or lesions    LABS:  05-12    144  |  114[H]  |  66[H]  ----------------------------<  138[H]  4.2   |  23  |  1.63[H]    Ca    8.8      12 May 2025 10:20  Phos  3.7     05-12  Mg     2.1     05-12    TPro  5.8[L]  /  Alb  2.9[L]  /  TBili  0.4  /  DBili  x   /  AST  16  /  ALT  22  /  AlkPhos  53  05-11    Creatinine Trend: 1.63<--, 1.71<--, 1.53<--, 1.66<--                        7.7    7.89  )-----------( 176      ( 12 May 2025 10:20 )             23.0       Lipid Panel:   Cardiac Enzymes:         RADIOLOGY:IMPRESSION:  1. No evidence of intra-abdominal hematoma.  2. Probable subtle retained renal cortical contrast, suggestive of acute   tubular necrosis (ATN).  3. Slight increase in size of the moderate right and small left pleural   effusions with adjacent atelectasis.  4. There is new trace high attenuation within the upper aspect of the   right pleural effusion, which is nonspecific.    --- End of Report ---    < from: CT Angio Chest PE Protocol w/ IV Cont (05.09.25 @ 19:52) >  MPRESSION:  1. No evidence of pulmonary embolism.  2. New small to moderate right pleural effusion and small left pleural   effusion with adjacent airspace opacity, favored to represent   atelectasis. Infection is considered less likely, though is possible in   the appropriate clinical setting.  3. Additional chronic and incidental findings are present as detailed   above.    < end of copied text >  < from: CT Angio Chest PE Protocol w/ IV Cont (05.09.25 @ 19:52) >  MPRESSION:  1. No evidence of pulmonary embolism.  2. New small to moderate right pleural effusion and small left pleural   effusion with adjacent airspace opacity, favored to represent   atelectasis. Infection is considered less likely, though is possible in   the appropriate clinical setting.  3. Additional chronic and incidental findings are present as detailed   above.    < end of copied text >  < from: Xray Chest 1 View- PORTABLE-Urgent (05.09.25 @ 18:28) >  IMPRESSION:  Cardiomegaly.  Peribronchial haziness in the right lower lobe with small right pleural   effusion, concerning for pneumonia.      < end of copied text >  < from: Xray Chest 1 View- PORTABLE-Urgent (05.09.25 @ 18:28) >  IMPRESSION:  Cardiomegaly.  Peribronchial haziness in the right lower lobe with small right pleural   effusion, concerning for pneumonia.      < end of copied text >      ECG [my interpretation]:    TELEMETRY: pacing HR maintains 65    ECHO:< from: TTE W or WO Ultrasound Enhancing Agent (05.10.25 @ 10:41) >     CONCLUSIONS:      1. Left ventricular cavity is small. Left ventricular systolic function is normal with an ejection fraction visually estimated at 50 to 55 %. There are no regional wall motion abnormalities seen.D shaped septum indicative of RV pressure/volume overload.   2. There is increased LV mass and concentric hypertrophy.   3. Mild left ventricular hypertrophy.   4. There is moderate (grade 2) left ventricular diastolic dysfunction.   5. Based on visual assessment, the right ventricle appears mildly enlarged. normal wall thickness, and reduced right ventricular systolic function. Tricuspid annular plane systolic excursion (TAPSE) is 1.5 cm (normal >=1.7 cm).   6. Left atrium is severely dilated.   7. The right atrium is severely dilated.   8. Moderate mitral regurgitation.   9. Normal left and right atrial size.  10. Moderate tricuspid regurgitation.  11. Moderate pulmonic regurgitation.  12. Estimated pulmonary artery systolic pressure is 69 mmHg, consistent with elevated pulmonary artery pressure.  13. The inferior vena cava is normal in size measuring 1.80 cm in diameter, (normal <2.1cm) with abnormal inspiratory collapse (abnormal <50%) consistent with mildly elevated right atrial pressure (~8, range 5-10mmHg).  14. No pericardial effusion seen.  15. No prior echocardiogram is available for comparison.      < end of copied text >

## 2025-05-12 NOTE — BH CONSULTATION LIAISON ASSESSMENT NOTE - NSBHCONSULTFOLLOW_PSY_ALL_CORE
Changed pharmacy. New prescriptions sent.        
No, psychiatric follow up needed - call with questions...

## 2025-05-12 NOTE — CONSULT NOTE ADULT - PROBLEM SELECTOR RECOMMENDATION 7
Palliative Care consulted for complex decision making in the setting of advanced illness.  Pt is agreeable to for discharge to LTC with hospice.  SW referral made.  New MOLST: DNR/DNI/no PEG/comfort only/DNH

## 2025-05-12 NOTE — CONSULT NOTE ADULT - PROBLEM SELECTOR RECOMMENDATION 9
rate controlled with monitor , please order TTE to Check LV function]  check PPM monday
p/w SOB 2/2 CHF  CXR -New small to moderate right pleural effusion and small left pleural effusion with adjacent airspace opacity      DNR/DNI

## 2025-05-12 NOTE — CONSULT NOTE ADULT - PROBLEM SELECTOR RECOMMENDATION 5
Pt was ambulatory w walker or a cane.  Currently bedbound. Requires assistance with ADLs. Risk for recurrent infections, skin failure/breakdown, and repeat hospital admissions.  Supportive care  Freq positioning    Pt eval Clinical evidence indicates that the patient has Severe protein calorie malnutrition/ 3rd degree    In context of   Chronic Illness (>1 month)    Energy/Food intake <50% of estimated energy requirement >5 days  Weight loss: Moderate - severe (lbs lost recently)  Body Fat loss: Severe   (Cachexia, temporal wasting, contracted, muscle atrophy)  Muscle mass loss: Severe  (Skin failure/pressure ulcers)  Fluid Accumulation: Severe (Fluid overload, ascites, pleural effusions)   Strength: weakened severe (bedbound)    Recommend:   pleasure feeds as tolerated - aspiration precautions, careful hand-feeding, teaching to caregivers  nutritional supplements as tolerated, nutrition consult    No PEG

## 2025-05-12 NOTE — BH CONSULTATION LIAISON ASSESSMENT NOTE - CURRENT MEDICATION
MEDICATIONS  (STANDING):  atorvastatin 40 milliGRAM(s) Oral at bedtime  metoprolol succinate ER 25 milliGRAM(s) Oral daily    MEDICATIONS  (PRN):  acetaminophen     Tablet .. 650 milliGRAM(s) Oral every 6 hours PRN Temp greater or equal to 38C (100.4F), Mild Pain (1 - 3)  benzocaine/menthol Lozenge 1 Lozenge Oral every 6 hours PRN cough  guaiFENesin Oral Liquid (Sugar-Free) 200 milliGRAM(s) Oral every 6 hours PRN Cough

## 2025-05-12 NOTE — CONSULT NOTE ADULT - PROBLEM SELECTOR RECOMMENDATION 3
BATSHEVA on CKD. Likely cardio-renal w hypervolemia Scr stable         Avoid Nephrotoxic Meds/ NSAIDs, Recent hx of attempted suicide was admitted to [facility] psych unit.  Pt states she is ready "to go"; she is tired and had not planned to live this long.      -Psych consult

## 2025-05-13 LAB
ANION GAP SERPL CALC-SCNC: 6 MMOL/L — SIGNIFICANT CHANGE UP (ref 5–17)
BASOPHILS # BLD AUTO: 0.08 K/UL — SIGNIFICANT CHANGE UP (ref 0–0.2)
BASOPHILS NFR BLD AUTO: 1 % — SIGNIFICANT CHANGE UP (ref 0–2)
BUN SERPL-MCNC: 49 MG/DL — HIGH (ref 7–18)
CALCIUM SERPL-MCNC: 8.6 MG/DL — SIGNIFICANT CHANGE UP (ref 8.4–10.5)
CHLORIDE SERPL-SCNC: 116 MMOL/L — HIGH (ref 96–108)
CO2 SERPL-SCNC: 23 MMOL/L — SIGNIFICANT CHANGE UP (ref 22–31)
CREAT SERPL-MCNC: 1.37 MG/DL — HIGH (ref 0.5–1.3)
EGFR: 35 ML/MIN/1.73M2 — LOW
EGFR: 35 ML/MIN/1.73M2 — LOW
EOSINOPHIL # BLD AUTO: 0.51 K/UL — HIGH (ref 0–0.5)
EOSINOPHIL NFR BLD AUTO: 6.2 % — HIGH (ref 0–6)
GLUCOSE SERPL-MCNC: 110 MG/DL — HIGH (ref 70–99)
HCT VFR BLD CALC: 24.4 % — LOW (ref 34.5–45)
HGB BLD-MCNC: 7.9 G/DL — LOW (ref 11.5–15.5)
IMM GRANULOCYTES NFR BLD AUTO: 0.7 % — SIGNIFICANT CHANGE UP (ref 0–0.9)
LYMPHOCYTES # BLD AUTO: 0.8 K/UL — LOW (ref 1–3.3)
LYMPHOCYTES # BLD AUTO: 9.7 % — LOW (ref 13–44)
M PNEUMO IGM SER-ACNC: 0.59 INDEX — SIGNIFICANT CHANGE UP (ref 0–0.9)
MAGNESIUM SERPL-MCNC: 2.2 MG/DL — SIGNIFICANT CHANGE UP (ref 1.6–2.6)
MCHC RBC-ENTMCNC: 27.3 PG — SIGNIFICANT CHANGE UP (ref 27–34)
MCHC RBC-ENTMCNC: 32.4 G/DL — SIGNIFICANT CHANGE UP (ref 32–36)
MCV RBC AUTO: 84.4 FL — SIGNIFICANT CHANGE UP (ref 80–100)
MONOCYTES # BLD AUTO: 0.95 K/UL — HIGH (ref 0–0.9)
MONOCYTES NFR BLD AUTO: 11.5 % — SIGNIFICANT CHANGE UP (ref 2–14)
MYCOPLASMA AG SPEC QL: NEGATIVE — SIGNIFICANT CHANGE UP
NEUTROPHILS # BLD AUTO: 5.83 K/UL — SIGNIFICANT CHANGE UP (ref 1.8–7.4)
NEUTROPHILS NFR BLD AUTO: 70.9 % — SIGNIFICANT CHANGE UP (ref 43–77)
NRBC BLD AUTO-RTO: 0 /100 WBCS — SIGNIFICANT CHANGE UP (ref 0–0)
PHOSPHATE SERPL-MCNC: 3.5 MG/DL — SIGNIFICANT CHANGE UP (ref 2.5–4.5)
PLATELET # BLD AUTO: 192 K/UL — SIGNIFICANT CHANGE UP (ref 150–400)
POTASSIUM SERPL-MCNC: 4.2 MMOL/L — SIGNIFICANT CHANGE UP (ref 3.5–5.3)
POTASSIUM SERPL-SCNC: 4.2 MMOL/L — SIGNIFICANT CHANGE UP (ref 3.5–5.3)
RBC # BLD: 2.89 M/UL — LOW (ref 3.8–5.2)
RBC # FLD: 18.7 % — HIGH (ref 10.3–14.5)
SODIUM SERPL-SCNC: 145 MMOL/L — SIGNIFICANT CHANGE UP (ref 135–145)
WBC # BLD: 8.23 K/UL — SIGNIFICANT CHANGE UP (ref 3.8–10.5)
WBC # FLD AUTO: 8.23 K/UL — SIGNIFICANT CHANGE UP (ref 3.8–10.5)

## 2025-05-13 PROCEDURE — 99232 SBSQ HOSP IP/OBS MODERATE 35: CPT

## 2025-05-13 PROCEDURE — 99233 SBSQ HOSP IP/OBS HIGH 50: CPT | Mod: GC

## 2025-05-13 RX ADMIN — ATORVASTATIN CALCIUM 40 MILLIGRAM(S): 80 TABLET, FILM COATED ORAL at 21:25

## 2025-05-13 NOTE — PROGRESS NOTE ADULT - PROBLEM SELECTOR PLAN 1
p/w sob x 1 week   CXR -New small to moderate right pleural effusion and small left pleural effusion with adjacent airspace opacity  BNP 2.3 K   Appears mildly hypervolemic on lung exam, also appears dehydrated ( dry mucus membranes )     takes metoprolol, lasix, losartan as per superscripts   s/p lasix 40 in ER   -c/w toprol with holding parameters   -Remote tele  -Troponin neg x 1   -daily weights, strict I&O  -Cardiology consulted: Dr Lim

## 2025-05-13 NOTE — PROGRESS NOTE ADULT - SUBJECTIVE AND OBJECTIVE BOX
PRESENTING CC: difficulty breathing      OVERNIGHT EVENTS: No new events overnight    PMH:   PAST MEDICAL & SURGICAL HISTORY:  Atrial fibrillation    Renal insufficiency    HTN (hypertension)    Breast cancer    Colon cancer    Pacemaker    HLD (hyperlipidemia)    Cardiac pacemaker    S/P peripheral artery bypass  in 1994    S/P mastectomy  1990    S/P colon resection  1993 for colon ca.    S/P hysterectomy    S/P cataract surgery    Cardiac pacemaker        Allergies    No Known Allergies    Intolerances        MEDICATIONS  (STANDING):  atorvastatin 40 milliGRAM(s) Oral at bedtime  metoprolol succinate ER 25 milliGRAM(s) Oral daily    MEDICATIONS  (PRN):  acetaminophen     Tablet .. 650 milliGRAM(s) Oral every 6 hours PRN Temp greater or equal to 38C (100.4F), Mild Pain (1 - 3)  benzocaine/menthol Lozenge 1 Lozenge Oral every 6 hours PRN cough  guaiFENesin Oral Liquid (Sugar-Free) 200 milliGRAM(s) Oral every 6 hours PRN Cough      FAMILY HISTORY:  FH: smoking  sister      Reviewed; no change from my prior note    SOCIAL HISTORY:  Reviewed, no change from my prior note    REVIEW OF SYSTEMS:  Constitutional: [ ] fever, [ ]weight loss,  [ ]fatigue  Eyes: [ ] visual changes  Respiratory: [ ]shortness of breath;  [ ] cough, [ ]wheezing, [ ]chills, [ ]hemoptysis  Cardiovascular: [ ] chest pain, [ ]palpitations, [ ]dizziness,  [ ]leg swelling [ ]syncope  Gastrointestinal: [ ] abdominal pain, [ ]nausea, [ ]vomiting,  [ ]diarrhea   Genitourinary: [ ] dysuria, [ ] hematuria  Neurologic: [ ] headaches [ ] tremors [ ] weakness [ ] lightheadedness  Skin: [ ] itching, [ ]burning, [ ] rashes  Endocrine: [ ] heat or cold intolerance  Musculoskeletal: [ ] joint pain or swelling; [ ] muscle, back, or extremity pain  Psychiatric: [ ] depression, [ ]anxiety, [ ]mood swings, or [ ]difficulty sleeping  Hematologic: [ ] easy bruising, [ ] bleeding gums    [x] All remaining systems negative except as per above.   [  ] Unable to obtain    Vital Signs Last 24 Hrs  T(C): 36.3 (13 May 2025 13:49), Max: 37 (12 May 2025 23:36)  T(F): 97.3 (13 May 2025 13:49), Max: 98.6 (12 May 2025 23:36)  HR: 64 (13 May 2025 13:49) (60 - 74)  BP: 132/75 (13 May 2025 13:49) (121/71 - 145/69)  BP(mean): 86 (13 May 2025 11:00) (77 - 91)  RR: 18 (13 May 2025 13:49) (17 - 18)  SpO2: 92% (13 May 2025 13:49) (92% - 100%)    Parameters below as of 13 May 2025 13:49  Patient On (Oxygen Delivery Method): room air      I&O's Summary    12 May 2025 07:01  -  13 May 2025 07:00  --------------------------------------------------------  IN: 0 mL / OUT: 1300 mL / NET: -1300 mL        PHYSICAL EXAM:  General: No acute distress  HEENT: EOMI  Neck: No JVD  Lungs: Clear to auscultation bilaterally; No rales or wheezing  Heart: Irregular rate and rhythm; No murmurs, rubs, or gallops  Abdomen: Soft, non tender, non distended   Extremities: Warm, +LLE edema   Nervous system:  Alert & Oriented X3  Psychiatric: Normal affect  Skin: No rashes or lesions    LABS:  05-13    145  |  116[H]  |  49[H]  ----------------------------<  110[H]  4.2   |  23  |  1.37[H]    Ca    8.6      13 May 2025 07:38  Phos  3.5     05-13  Mg     2.2     05-13      Creatinine Trend: 1.37<--, 1.63<--, 1.71<--, 1.53<--, 1.66<--                        7.9    8.23  )-----------( 192      ( 13 May 2025 07:38 )             24.4       Lipid Panel:   Cardiac Enzymes:         RADIOLOGY:IMPRESSION:  1. No evidence of intra-abdominal hematoma.  2. Probable subtle retained renal cortical contrast, suggestive of acute   tubular necrosis (ATN).  3. Slight increase in size of the moderate right and small left pleural   effusions with adjacent atelectasis.  4. There is new trace high attenuation within the upper aspect of the   right pleural effusion, which is nonspecific.    --- End of Report ---    < from: CT Angio Chest PE Protocol w/ IV Cont (05.09.25 @ 19:52) >  MPRESSION:  1. No evidence of pulmonary embolism.  2. New small to moderate right pleural effusion and small left pleural   effusion with adjacent airspace opacity, favored to represent   atelectasis. Infection is considered less likely, though is possible in   the appropriate clinical setting.  3. Additional chronic and incidental findings are present as detailed   above.    < end of copied text >  < from: CT Angio Chest PE Protocol w/ IV Cont (05.09.25 @ 19:52) >  MPRESSION:  1. No evidence of pulmonary embolism.  2. New small to moderate right pleural effusion and small left pleural   effusion with adjacent airspace opacity, favored to represent   atelectasis. Infection is considered less likely, though is possible in   the appropriate clinical setting.  3. Additional chronic and incidental findings are present as detailed   above.    < end of copied text >  < from: Xray Chest 1 View- PORTABLE-Urgent (05.09.25 @ 18:28) >  IMPRESSION:  Cardiomegaly.  Peribronchial haziness in the right lower lobe with small right pleural   effusion, concerning for pneumonia.      < end of copied text >  < from: Xray Chest 1 View- PORTABLE-Urgent (05.09.25 @ 18:28) >  IMPRESSION:  Cardiomegaly.  Peribronchial haziness in the right lower lobe with small right pleural   effusion, concerning for pneumonia.      < end of copied text >      ECG :< from: 12 Lead ECG (05.09.25 @ 16:37) >    Diagnosis Line Atrial fibrillation with occasional ventricular-paced complexes and with premature ventricular or aberrantly conducted complexes HR 79  Left axis deviation  Septal infarct , age undetermined  Abnormal ECG    Confirmed by NATALIYA MAGALLON, [facility] ([id number]) on 5/13/2025 12:58:57 PM    < end of copied text >      TELEMETRY: pacing HR maintains 65    ECHO:< from: TTE W or WO Ultrasound Enhancing Agent (05.10.25 @ 10:41) >     CONCLUSIONS:      1. Left ventricular cavity is small. Left ventricular systolic function is normal with an ejection fraction visually estimated at 50 to 55 %. There are no regional wall motion abnormalities seen.D shaped septum indicative of RV pressure/volume overload.   2. There is increased LV mass and concentric hypertrophy.   3. Mild left ventricular hypertrophy.   4. There is moderate (grade 2) left ventricular diastolic dysfunction.   5. Based on visual assessment, the right ventricle appears mildly enlarged. normal wall thickness, and reduced right ventricular systolic function. Tricuspid annular plane systolic excursion (TAPSE) is 1.5 cm (normal >=1.7 cm).   6. Left atrium is severely dilated.   7. The right atrium is severely dilated.   8. Moderate mitral regurgitation.   9. Normal left and right atrial size.  10. Moderate tricuspid regurgitation.  11. Moderate pulmonic regurgitation.  12. Estimated pulmonary artery systolic pressure is 69 mmHg, consistent with elevated pulmonary artery pressure.  13. The inferior vena cava is normal in size measuring 1.80 cm in diameter, (normal <2.1cm) with abnormal inspiratory collapse (abnormal <50%) consistent with mildly elevated right atrial pressure (~8, range 5-10mmHg).  14. No pericardial effusion seen.  15. No prior echocardiogram is available for comparison.    s/p PPM Interrogation-normal device functioning

## 2025-05-13 NOTE — DISCHARGE NOTE PROVIDER - CARE PROVIDER_API CALL
Peter Barrera  Cardiovascular Disease  [address]  [place] [address]  Phone: [phone]  Fax: [phone]  Follow Up Time: 1 week    [facility]  [address], [place] [address]  Phone: [phone]  Fax: (   )    -  Follow Up Time: 1 week    Coy De Souza  Medical Oncology  [address], [address]  [place] [address]  Phone: [phone]  Fax: [phone]  Follow Up Time: Routine

## 2025-05-13 NOTE — DISCHARGE NOTE PROVIDER - HOSPITAL COURSE
89/ F from home, [place], ambulates with a cane/ walker at baseline, AOx 3 with PMHx  HTN, HLD, afib on eliquis , recent PPM placement 3/25 presented to ER with c/o SOB on exertion, subjective fever, chills , non productive cough, chest congestion x 1 week , worsening over past 2 days. Denies chest pain, recent sick contacts, recent travel. Denies nausea, vomiting , constipation , diarrhea.     Of note : as per chart review and ER sign out - Had an echocardiogram outpatient that showed high pulmonary pressures and mitral valve regurgitation with left atrial enlargement.  Also noted possible clot around the pacemaker wire.      In  the ER, VS - / 79, RR20, , afebrile, sats 92% on 2  L NC. EKG - afib not in RVR. Labs s/o Hb 8 ( from 11.4), neutrophilic shift , BUN/ Cr 88/ 1.66 from ( 28/1.26) , lact 3.3, p BNP 2.3 K. CTA PE: 1. No e/o pulmonary embolism. 2. New small to moderate right pleural effusion and small left pleural effusion with adjacent airspace opacity, favored to represent atelectasis. CXR: Cardiomegaly. Peribronchial haziness in the right lower lobe with small right pleural effusion, concerning for pneumonia. S/p lasix 40 , CTX 2 g. Admitted to telemetry for AHRF 2/2 CHF vs PNA.     TTE showing G2DD, moderate MR, TR, OR, severely dilated LA, no WMA. Cardiology consulted, no acute interventions recommended.  Course complicated by worsening anemia. No hematoma/RP bleed seen on CT. S/p 1u pRBC on 5/11.   Palliative consulted. Pt made DNR/DNI. Pt and HCP have opted for discharge to LTC/Hospice.    Patient is hemodynamically stable and able to tolerate diet prior to discharge. Patient is stable for discharge per attending and is advised to follow up with PCP as outpatient. 89 y/o F from home, hard of hearing, ambulates with a cane/ walker at baseline, AOx 3 with PMHx  HTN, HLD, afib on eliquis , recent PPM placement 3/25 presented to ER with c/o SOB on exertion, subjective fever, chills , non productive cough, chest congestion x 1 week , worsening over past 2 days. Denies chest pain, recent sick contacts, recent travel. Denies nausea, vomiting , constipation, diarrhea.     Pt had an echocardiogram outpatient that showed high pulmonary pressures and mitral valve regurgitation with left atrial enlargement.  Also noted possible clot around the pacemaker wire.  PPM interrogated - Normal device function.    In the ER, VS - / 79, RR20, , afebrile, sats 92% on 2  L NC. EKG - afib not in RVR. Labs s/o Hb 8 ( from 11.4), neutrophilic shift , BUN/ Cr 88/ 1.66 from ( 28/1.26) , lact 3.3, p BNP 2.3 K. CTA PE: 1. No pulmonary embolism. 2. New small to moderate right pleural effusion and small left pleural effusion with adjacent airspace opacity, favored to represent atelectasis. CXR: Cardiomegaly. Peribronchial haziness in the right lower lobe with small right pleural effusion, concerning for pneumonia. Admitted to telemetry for AHRF 2/2 CHF vs PNA. Given Lasix and Ceftriaxone 5/9-5/12.     TTE showing G2DD, moderate MR, TR, NJ, severely dilated LA, no WMA. Cardiology consulted, no acute interventions recommended.  Course complicated by worsening anemia. No hematoma/RP bleed seen on CT. S/p 1u pRBC on 5/11.   Palliative consulted. Pt made DNR/DNI.   Also given 3 days of IV iron transfusions.     During hospitalization pt required fraire placement and later passed trial of void.     Attempted to ween pt off oxygen, however was found to be 82% oxygen saturation on room air with exertion. Home oxygen concentrator obtained.   For the bilateral pleural effusions, pt was started on PO lasix 40 mg daily.   Repeat CXR on 5/24- improvement of congestion/infiltrates with now small bilateral pleural effusions.     Patient is hemodynamically stable and able to tolerate diet prior to discharge. Patient is stable for discharge per attending and is advised to follow up with PCP as outpatient.

## 2025-05-13 NOTE — DISCHARGE NOTE PROVIDER - NSDCFUADDAPPT_GEN_ALL_CORE_FT
APPTS ARE READY TO BE MADE: [X] YES    Best Family or Patient Contact (if needed):    Additional Information about above appointments (if needed):    1: PCP Dr. Latham   2: Cardio Dr. Barrera  3: Hematology Dr. De Souza    Other comments or requests:    	APPTS ARE READY TO BE MADE: [X] YES    Best Family or Patient Contact (if needed):    Additional Information about above appointments (if needed):    1: PCP Dr. Latham   2: Cardio Dr. Barrera  3: Hematology Dr. De Souza    Other comments or requests:     Patient was outreached, however they advised they were readmitted to the hospital. Spoke with Mckayla, patients healthcare proxy she said patient fell and is currently in [facility]. She said patient did not want to follow up with the referrals on her list. Mckayla said she has the information and would call if patient wants to schedule.

## 2025-05-13 NOTE — DISCHARGE NOTE PROVIDER - CARE PROVIDERS DIRECT ADDRESSES
[email or web address]

## 2025-05-13 NOTE — DISCHARGE NOTE PROVIDER - NSDCMRMEDTOKEN_GEN_ALL_CORE_FT
Eliquis 2.5 mg oral tablet: 1 tab(s) orally 2 times a day RESTART ELIQUIS ON 3/30/25 IN THE MORNING  furosemide 20 mg oral tablet: 1 tab(s) orally once a day  Lipitor 40 mg oral tablet: 1 tab(s) orally once a day  losartan 50 mg oral tablet: 1 tab(s) orally once a day  metoprolol succinate 25 mg oral capsule, extended release: 1 cap(s) orally once a day  NIFEdipine (Eqv-Adalat CC) 30 mg oral tablet, extended release: 1 tab(s) orally once a day  TRENtal 400 mg oral tablet, extended release: 1 tab(s) orally 3 times a day   Eliquis 2.5 mg oral tablet: 1 tab(s) orally 2 times a day RESTART ELIQUIS ON 3/30/25 IN THE MORNING  ferrous sulfate 325 mg (65 mg elemental iron) oral tablet: 1 tab(s) orally once a day  furosemide 20 mg oral tablet: 1 tab(s) orally once a day  losartan 50 mg oral tablet: 1 tab(s) orally once a day  metoprolol succinate 25 mg oral tablet, extended release: 1 tab(s) orally once a day  Multiple Vitamins oral tablet: 1 tab(s) orally once a day  NIFEdipine (Eqv-Adalat CC) 30 mg oral tablet, extended release: 1 tab(s) orally once a day  Vitamin C 500 mg oral tablet, chewable: 1 tab(s) chewed once a day   Eliquis 2.5 mg oral tablet: 1 tab(s) orally 2 times a day  ferrous sulfate 325 mg (65 mg elemental iron) oral tablet: 1 tab(s) orally once a day  furosemide 40 mg oral tablet: 1 tab(s) orally every other day take on Monday, Wednesdays, Fridays only  losartan 50 mg oral tablet: 1 tab(s) orally once a day  metoprolol succinate 25 mg oral tablet, extended release: 1 tab(s) orally once a day  Multiple Vitamins oral tablet: 1 tab(s) orally once a day  NIFEdipine (Eqv-Adalat CC) 30 mg oral tablet, extended release: 1 tab(s) orally once a day  Vitamin C 500 mg oral tablet, chewable: 1 tab(s) chewed once a day

## 2025-05-13 NOTE — DISCHARGE NOTE PROVIDER - PROVIDER TOKENS
PROVIDER:[TOKEN:[[id number]],FOLLOWUP:[1 week]],FREE:[LAST:[sejal],FIRST:[angus],PHONE:[phone]],FAX:[(   )    -],ADDRESS:[[address] [place] [address]],FOLLOWUP:[1 week]],PROVIDER:[TOKEN:[[id number]],FOLLOWUP:[Routine]]

## 2025-05-13 NOTE — PHYSICAL THERAPY INITIAL EVALUATION ADULT - GENERAL OBSERVATIONS, REHAB EVAL
Consult received, chart reviewed. Patient received supine in bed, NAD, +ADOLFO matos . Patient agreed to EVALUATION from Physical Therapist.

## 2025-05-13 NOTE — DISCHARGE NOTE PROVIDER - NSDCFUSCHEDAPPT_GEN_ALL_CORE_FT
[facility] [phone]  Scheduled Appointment: 05/16/2025    [facility] [phone]  Scheduled Appointment: 07/11/2025     [facility] Physician [facility] [phone]  Scheduled Appointment: 07/11/2025

## 2025-05-13 NOTE — DISCHARGE NOTE PROVIDER - NSDCCPCAREPLAN_GEN_ALL_CORE_FT
PRINCIPAL DISCHARGE DIAGNOSIS  Diagnosis: Acute hypoxic respiratory failure  Assessment and Plan of Treatment:       SECONDARY DISCHARGE DIAGNOSES  Diagnosis: CHF exacerbation  Assessment and Plan of Treatment:     Diagnosis: Chronic atrial fibrillation  Assessment and Plan of Treatment:     Diagnosis: HTN (hypertension)  Assessment and Plan of Treatment:     Diagnosis: HLD (hyperlipidemia)  Assessment and Plan of Treatment:     Diagnosis: Chronic kidney disease (CKD)  Assessment and Plan of Treatment:      PRINCIPAL DISCHARGE DIAGNOSIS  Diagnosis: Acute hypoxic respiratory failure  Assessment and Plan of Treatment: you came into the hospital due to worsening shortness of breath  you were found to have low oxygen saturation due to recent bilateral pleural effusions and required Lasix.   your CT scan of your chest that showed -  moderate right pleural effusion and small left pleural effusion  you had a repeat chest xray on 5/24 that shows improvement of the Pleural effusions.   you are given a oxygen concentrator for use when moving around the house to reduce your symptoms of shortness of breath  continue using 2 Liters of oxygen  What is pleural effusion? Pleural effusion is fluid buildup in the space between the layers of the pleura. The pleura is a thin piece of tissue with 2 layers. One layer rests directly on the lungs. The other rests on the chest wall. There is normally a small amount of fluid between these layers. This fluid helps your lungs move easily when you breathe.  Call your local emergency number (911 in the [place]) if:   •You find it very hard to breathe.  •You feel faint, or you cannot think clearly.  When should I seek immediate care?   •Your breathing problems do not go away, or they get worse.        SECONDARY DISCHARGE DIAGNOSES  Diagnosis: Acute on chronic diastolic congestive heart failure  Assessment and Plan of Treatment: you had an echocardiogram that showed normal ejection fraction of 50-55% with grade 2 diastolic dysfunction  continue taking metoprolol, losartan and lasix daily  follow up with your Cardiologist in 1 week.  Call your local emergency number (911 in the [place]) if:  You have any of the following signs of a heart attack:  Squeezing, pressure, or pain in your chest  Limit sodium (salt).   Weigh yourself every morning.       Diagnosis: Atelectasis  Assessment and Plan of Treatment: on your imaging of your chest you have evidence of atelectasis  An incentive spirometer is like an exercise machine for your lungs. It helps you maintain your lung strength, or it helps your lungs get stronger after an injury or illness.  To properly use an incentive spirometer, you should:  Sit on the edge of your bed   Hold the incentive spirometer in an upright position.  Place the mouthpiece in your mouth and tightly seal your lips around it.  Breathe in as slowly and deeply as possible. You’ll notice a yellow piston rising toward the top of the column. The yellow piston should reach the blue outlined area.  Hold your breath for as long as possible, or at least five seconds. Exhale slowly and allow the piston to fall to the bottom of the column.  Rest for a few seconds, and then repeat the first five steps at least 10 times every hour you’re awake.  Place the yellow indicator on the side of your incentive spirometer to show your best breath. Use the indicator as a goal to work toward during each slow, deep breath.  After each set of 10 deep breaths, cough deeply to clear your lungs.   Once you can get out of bed safely, take frequent short walks and practice coughing.    Diagnosis: Iron deficiency anemia  Assessment and Plan of Treatment: your symptoms of shortness of breath is also related to your recent anemia  you had low blood count and low iron stores  you were given 1 unit of blood and mutiple iron transfusions  continue taking ferrous sulfate 325 mg daily + vitamin c 500 mg daily   follow up with Hematologist Dr. De Souza    Diagnosis: Stage 3 chronic kidney disease  Assessment and Plan of Treatment: you have a history of chronic kidney disease   you have creatinine of around 1.3  continue taking all your blood pressure medications  monitor your kidney function while on lasix  follow up with your Primary Care Doctor  stay hydrated drink plenty of fluids    Diagnosis: Prediabetes  Assessment and Plan of Treatment: you are pre-diabetic a1c 5.7%  What is prediabetes? Prediabetes is a blood glucose (sugar) level that is higher than normal. It is not high enough to be considered diabetes.   .  Eat healthy foods. Eat a variety of fruits and vegetables. Eat whole-grain foods more often. Choose dairy foods, meat, and other protein foods that are low in fat. Eat fewer sweets, such as candy, cookies, regular soda, and sweetened drinks.    Diagnosis: Chronic atrial fibrillation  Assessment and Plan of Treatment: continue taking eliquis and metoprolol daily.    Diagnosis: HTN (hypertension)  Assessment and Plan of Treatment: continue taking losartan, metoprolol and nifedipine daily.    Diagnosis: HLD (hyperlipidemia)  Assessment and Plan of Treatment: your cholesterol levels are well controlled and you no longer need to take atorvastatin    Diagnosis: Moderate protein-calorie malnutrition  Assessment and Plan of Treatment: continue ensure shakes twice a day   take a multivitamin tab daily     PRINCIPAL DISCHARGE DIAGNOSIS  Diagnosis: Acute hypoxic respiratory failure  Assessment and Plan of Treatment: you came into the hospital due to worsening shortness of breath  you were found to have low oxygen saturation due to recent bilateral pleural effusions and required Lasix.   your CT scan of your chest that showed -  moderate right pleural effusion and small left pleural effusion  you had a repeat chest xray on 5/24 that shows improvement of the Pleural effusions.   you are given a oxygen concentrator for use when moving around the house to reduce your symptoms of shortness of breath  continue using 2 Liters of oxygen  What is pleural effusion? Pleural effusion is fluid buildup in the space between the layers of the pleura. The pleura is a thin piece of tissue with 2 layers. One layer rests directly on the lungs. The other rests on the chest wall. There is normally a small amount of fluid between these layers. This fluid helps your lungs move easily when you breathe.  Call your local emergency number (911 in the [place]) if:   •You find it very hard to breathe.  •You feel faint, or you cannot think clearly.  When should I seek immediate care?   •Your breathing problems do not go away, or they get worse.        SECONDARY DISCHARGE DIAGNOSES  Diagnosis: Acute on chronic diastolic congestive heart failure  Assessment and Plan of Treatment: you had an echocardiogram that showed normal ejection fraction of 50-55% with grade 2 diastolic dysfunction  continue taking metoprolol, losartan   take lasix 40 mg every other day - Mondays, wednesdays, fridays  follow up with your Cardiologist in 1 week.  Call your local emergency number (911 in the [place]) if:  You have any of the following signs of a heart attack:  Squeezing, pressure, or pain in your chest  Limit sodium (salt).   Weigh yourself every morning.       Diagnosis: Atelectasis  Assessment and Plan of Treatment: on your imaging of your chest you have evidence of atelectasis  An incentive spirometer is like an exercise machine for your lungs. It helps you maintain your lung strength, or it helps your lungs get stronger after an injury or illness.  To properly use an incentive spirometer, you should:  Sit on the edge of your bed   Hold the incentive spirometer in an upright position.  Place the mouthpiece in your mouth and tightly seal your lips around it.  Breathe in as slowly and deeply as possible. You’ll notice a yellow piston rising toward the top of the column. The yellow piston should reach the blue outlined area.  Hold your breath for as long as possible, or at least five seconds. Exhale slowly and allow the piston to fall to the bottom of the column.  Rest for a few seconds, and then repeat the first five steps at least 10 times every hour you’re awake.  Place the yellow indicator on the side of your incentive spirometer to show your best breath. Use the indicator as a goal to work toward during each slow, deep breath.  After each set of 10 deep breaths, cough deeply to clear your lungs.   Once you can get out of bed safely, take frequent short walks and practice coughing.    Diagnosis: Iron deficiency anemia  Assessment and Plan of Treatment: your symptoms of shortness of breath is also related to your recent anemia  you had low blood count and low iron stores  you were given 1 unit of blood and mutiple iron transfusions  continue taking ferrous sulfate 325 mg daily + vitamin c 500 mg daily   follow up with Hematologist Dr. De Souza    Diagnosis: Stage 3 chronic kidney disease  Assessment and Plan of Treatment: you have a history of chronic kidney disease   you have creatinine of around 1.3  continue taking all your blood pressure medications  monitor your kidney function while on lasix  follow up with your Primary Care Doctor  stay hydrated drink plenty of fluids    Diagnosis: Prediabetes  Assessment and Plan of Treatment: you are pre-diabetic a1c 5.7%  What is prediabetes? Prediabetes is a blood glucose (sugar) level that is higher than normal. It is not high enough to be considered diabetes.   .  Eat healthy foods. Eat a variety of fruits and vegetables. Eat whole-grain foods more often. Choose dairy foods, meat, and other protein foods that are low in fat. Eat fewer sweets, such as candy, cookies, regular soda, and sweetened drinks.    Diagnosis: Chronic atrial fibrillation  Assessment and Plan of Treatment: continue taking eliquis and metoprolol daily.    Diagnosis: HTN (hypertension)  Assessment and Plan of Treatment: continue taking losartan, metoprolol and nifedipine daily.    Diagnosis: HLD (hyperlipidemia)  Assessment and Plan of Treatment: your cholesterol levels are well controlled and you no longer need to take atorvastatin    Diagnosis: Moderate protein-calorie malnutrition  Assessment and Plan of Treatment: continue ensure shakes twice a day   take a multivitamin tab daily
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
## 2025-05-13 NOTE — PROGRESS NOTE ADULT - ASSESSMENT
89/ F from home, [place], ambulates with a cane/ walker at baseline, AOx 3 with PMHx  HTN, HLD, chronic LLE edema , afib on eliquis , recent PPM placement 3/25 presented to ER with c/o SOB on exertion, subjective fever, chills , non productive cough, chest congestion x 1 week. Labs s/o Hb 8 ( from 11.4), neutrophilic shift , BUN/ Cr 88/ 1.66 from ( 28/1.26) , lact 3.3, p BNP 2.3 K . Imaging c/w BL pleural effusions ? R sided infiltrate . S/p lasix 40 , CTX 2 g . Admitted to medicine for AHRF 2/2 CHF vs PNA

## 2025-05-13 NOTE — PROGRESS NOTE ADULT - ASSESSMENT
89/ F from home, [place], ambulates with a cane/ walker at baseline, AOx 3 with PMHx  HTN, HLD, chronic LLE edema , afib on eliquis , recent PPM placement 3/25 presented to ER with c/o SOB on exertion, subjective fever, chills , non productive cough, chest congestion x 1 week.  Admitted to medicine for AHRF 2/2 CHF vs PNA      1. Chronic atrial fibrillation.   Off Eliquis at present  due to low H/H, resume once bleeding stabilizes  Rate stable Due to Pacemaker.  Continue Toprol XL 25mg daily    2.  Anemia.   management as per primary team    3.Pleural effusion.   Patient clinically not in CHF  hold off diuresis  ECHO-EF 50-55%. No WMA, mod grade 2 LVDD, Estimated pulmonary artery systolic pressure is 69 mmHg, consistent with elevated pulmonary artery pressure.      4. HTN  BPs controlled  Home BP medications on hold    5. HLD  Continue atorvastatin 40mg PO QHS    Patient GOC completed on 5/10, DNR/DNI, for LTC with comfort/palliative  Cardiology will sign off. May re-consult if you have any questions

## 2025-05-13 NOTE — PROGRESS NOTE ADULT - SUBJECTIVE AND OBJECTIVE BOX
PGY-1 Progress Note discussed with attending    PLEASE MS TEAMS AUTHOR TILL 5:00 PM    PLEASE CONTACT ON CALL TEAM:  - On Call Team (Please refer to Gypsy) FROM 5:00 PM - 8:30PM  - Nightfloat Team FROM 8:30 -7:30 AM      INTERVAL HPI/OVERNIGHT EVENTS: No acute events overnight.    SUBJECTIVE: Patient seen and examined at bedside this morning. ***INCOMPLETE***    ---------------------------    REVIEW OF SYSTEMS:  CONSTITUTIONAL: No fever, weight loss, fatigue  RESPIRATORY: No cough, wheezing, chills, hemoptysis, shortness of breath  CARDIOVASCULAR: No chest pain, palpitations, dizziness, leg swelling  GASTROINTESTINAL: No abdominal pain, nausea, vomiting, hematemesis, diarrhea, constipation, melena, hematochezia  GENITOURINARY: No dysuria, hematuria, urinary frequency  NEUROLOGICAL: No headaches, memory loss, loss of strength, numbness, tremors  SKIN: No itching, burning, rashes, lesions     MEDICATIONS  (STANDING):  atorvastatin 40 milliGRAM(s) Oral at bedtime  metoprolol succinate ER 25 milliGRAM(s) Oral daily    MEDICATIONS  (PRN):  acetaminophen     Tablet .. 650 milliGRAM(s) Oral every 6 hours PRN Temp greater or equal to 38C (100.4F), Mild Pain (1 - 3)  benzocaine/menthol Lozenge 1 Lozenge Oral every 6 hours PRN cough  guaiFENesin Oral Liquid (Sugar-Free) 200 milliGRAM(s) Oral every 6 hours PRN Cough      Vital Signs Last 24 Hrs  T(C): 36.6 (13 May 2025 07:34), Max: 37 (12 May 2025 23:36)  T(F): 97.9 (13 May 2025 07:34), Max: 98.6 (12 May 2025 23:36)  HR: 61 (13 May 2025 07:34) (60 - 69)  BP: 145/69 (13 May 2025 07:34) (125/67 - 145/69)  BP(mean): 91 (13 May 2025 07:34) (77 - 91)  RR: 18 (13 May 2025 07:34) (17 - 18)  SpO2: 100% (13 May 2025 07:34) (96% - 100%)    Parameters below as of 13 May 2025 07:34  Patient On (Oxygen Delivery Method): nasal cannula  O2 Flow (L/min): 2      -----------------------------    PHYSICAL EXAMINATION:  GENERAL: NAD, lying in bed  HEAD:  Atraumatic, Normocephalic  EYES:  conjunctiva and sclera clear  NECK: Supple, No JVD  CHEST/LUNG: Clear to auscultation bilaterally; No rales, rhonchi, wheezing, or rubs  HEART: Regular rate and rhythm; No murmurs, rubs, or gallops  ABDOMEN: Soft, Nontender, Nondistended; Bowel sounds present, no guarding  NERVOUS SYSTEM:  Alert & Oriented X3  : voiding well  EXTREMITIES:  2+ Peripheral Pulses, No clubbing, cyanosis, or edema  SKIN: warm dry                          7.9    8.23  )-----------( 192      ( 13 May 2025 07:38 )             24.4     05-13    145  |  116[H]  |  49[H]  ----------------------------<  110[H]  4.2   |  23  |  1.37[H]    Ca    8.6      13 May 2025 07:38  Phos  3.5     05-13  Mg     2.2     05-13    TPro  5.8[L]  /  Alb  2.9[L]  /  TBili  0.4  /  DBili  x   /  AST  16  /  ALT  22  /  AlkPhos  53  05-11    LIVER FUNCTIONS - ( 11 May 2025 10:55 )  Alb: 2.9 g/dL / Pro: 5.8 g/dL / ALK PHOS: 53 U/L / ALT: 22 U/L DA / AST: 16 U/L / GGT: x                   I&O's Summary    12 May 2025 07:01  -  13 May 2025 07:00  --------------------------------------------------------  IN: 0 mL / OUT: 1300 mL / NET: -1300 mL            CAPILLARY BLOOD GLUCOSE      RADIOLOGY & ADDITIONAL TESTS:                   PGY-1 Progress Note discussed with attending    PLEASE MS TEAMS AUTHOR TILL 5:00 PM    PLEASE CONTACT ON CALL TEAM:  - On Call Team (Please refer to Gypsy) FROM 5:00 PM - 8:30PM  - Nightfloat Team FROM 8:30 -7:30 AM      INTERVAL HPI/OVERNIGHT EVENTS: No acute events overnight.    SUBJECTIVE: Patient seen and examined at bedside this morning. Denies any new complaints today. Feels that shortness of breath has improved.    ---------------------------    REVIEW OF SYSTEMS:  CONSTITUTIONAL: No fever, weight loss, fatigue  RESPIRATORY: No cough, wheezing, chills, hemoptysis, shortness of breath  CARDIOVASCULAR: No chest pain, palpitations, dizziness, leg swelling  GASTROINTESTINAL: No abdominal pain, nausea, vomiting, hematemesis, diarrhea, constipation, melena, hematochezia  GENITOURINARY: No dysuria, hematuria, urinary frequency  NEUROLOGICAL: No headaches, memory loss, loss of strength, numbness, tremors  SKIN: No itching, burning, rashes, lesions     MEDICATIONS  (STANDING):  atorvastatin 40 milliGRAM(s) Oral at bedtime  metoprolol succinate ER 25 milliGRAM(s) Oral daily    MEDICATIONS  (PRN):  acetaminophen     Tablet .. 650 milliGRAM(s) Oral every 6 hours PRN Temp greater or equal to 38C (100.4F), Mild Pain (1 - 3)  benzocaine/menthol Lozenge 1 Lozenge Oral every 6 hours PRN cough  guaiFENesin Oral Liquid (Sugar-Free) 200 milliGRAM(s) Oral every 6 hours PRN Cough      Vital Signs Last 24 Hrs  T(C): 36.6 (13 May 2025 07:34), Max: 37 (12 May 2025 23:36)  T(F): 97.9 (13 May 2025 07:34), Max: 98.6 (12 May 2025 23:36)  HR: 61 (13 May 2025 07:34) (60 - 69)  BP: 145/69 (13 May 2025 07:34) (125/67 - 145/69)  BP(mean): 91 (13 May 2025 07:34) (77 - 91)  RR: 18 (13 May 2025 07:34) (17 - 18)  SpO2: 100% (13 May 2025 07:34) (96% - 100%)    Parameters below as of 13 May 2025 07:34  Patient On (Oxygen Delivery Method): nasal cannula  O2 Flow (L/min): 2      -----------------------------    PHYSICAL EXAMINATION:  GENERAL: NAD, lying comfortably in bed  HEAD:  Atraumatic, Normocephalic  EYES:  PERRLA,  conjunctiva and sclera clear  NECK: Supple,    CHEST WALL: Nontender  LUNG: mild BL basilar crackles, R>L; saturating well on 2L  HEART: Regular rate and rhythm; No murmurs,  ABDOMEN: Soft, Nontender, Nondistended; Bowel sounds present, No Rovsing's sign   NERVOUS SYSTEM:  Alert & Oriented X3  EXTREMITIES: +LLE edema (chronic)  CALF: No Calf tenderness   SKIN: warm dry                          7.9    8.23  )-----------( 192      ( 13 May 2025 07:38 )             24.4     05-13    145  |  116[H]  |  49[H]  ----------------------------<  110[H]  4.2   |  23  |  1.37[H]    Ca    8.6      13 May 2025 07:38  Phos  3.5     05-13  Mg     2.2     05-13    TPro  5.8[L]  /  Alb  2.9[L]  /  TBili  0.4  /  DBili  x   /  AST  16  /  ALT  22  /  AlkPhos  53  05-11    LIVER FUNCTIONS - ( 11 May 2025 10:55 )  Alb: 2.9 g/dL / Pro: 5.8 g/dL / ALK PHOS: 53 U/L / ALT: 22 U/L DA / AST: 16 U/L / GGT: x                   I&O's Summary    12 May 2025 07:01  -  13 May 2025 07:00  --------------------------------------------------------  IN: 0 mL / OUT: 1300 mL / NET: -1300 mL            CAPILLARY BLOOD GLUCOSE      RADIOLOGY & ADDITIONAL TESTS:

## 2025-05-13 NOTE — PROGRESS NOTE ADULT - PROBLEM SELECTOR PLAN 2
WBC WNL    neutrophilic shift  T afebrile, reports subjective fever and chills x 1  week   CXR as above   RVP neg    s/p CTX in ED  ESR WNL; procal 0.3, CRP 6.2  Mycoplasma neg  - d/c abx

## 2025-05-14 DIAGNOSIS — R33.9 RETENTION OF URINE, UNSPECIFIED: ICD-10-CM

## 2025-05-14 LAB
ANION GAP SERPL CALC-SCNC: 8 MMOL/L — SIGNIFICANT CHANGE UP (ref 5–17)
BUN SERPL-MCNC: 42 MG/DL — HIGH (ref 7–18)
CALCIUM SERPL-MCNC: 8.7 MG/DL — SIGNIFICANT CHANGE UP (ref 8.4–10.5)
CHLORIDE SERPL-SCNC: 114 MMOL/L — HIGH (ref 96–108)
CO2 SERPL-SCNC: 21 MMOL/L — LOW (ref 22–31)
CREAT SERPL-MCNC: 1.22 MG/DL — SIGNIFICANT CHANGE UP (ref 0.5–1.3)
EGFR: 41 ML/MIN/1.73M2 — LOW
EGFR: 41 ML/MIN/1.73M2 — LOW
GLUCOSE SERPL-MCNC: 102 MG/DL — HIGH (ref 70–99)
HCT VFR BLD CALC: 24.2 % — LOW (ref 34.5–45)
HGB BLD-MCNC: 7.8 G/DL — LOW (ref 11.5–15.5)
MAGNESIUM SERPL-MCNC: 2.2 MG/DL — SIGNIFICANT CHANGE UP (ref 1.6–2.6)
MCHC RBC-ENTMCNC: 27.6 PG — SIGNIFICANT CHANGE UP (ref 27–34)
MCHC RBC-ENTMCNC: 32.2 G/DL — SIGNIFICANT CHANGE UP (ref 32–36)
MCV RBC AUTO: 85.5 FL — SIGNIFICANT CHANGE UP (ref 80–100)
NRBC BLD AUTO-RTO: 0 /100 WBCS — SIGNIFICANT CHANGE UP (ref 0–0)
PHOSPHATE SERPL-MCNC: 2.9 MG/DL — SIGNIFICANT CHANGE UP (ref 2.5–4.5)
PLATELET # BLD AUTO: 195 K/UL — SIGNIFICANT CHANGE UP (ref 150–400)
POTASSIUM SERPL-MCNC: 3.5 MMOL/L — SIGNIFICANT CHANGE UP (ref 3.5–5.3)
POTASSIUM SERPL-SCNC: 3.5 MMOL/L — SIGNIFICANT CHANGE UP (ref 3.5–5.3)
RBC # BLD: 2.83 M/UL — LOW (ref 3.8–5.2)
RBC # FLD: 18.5 % — HIGH (ref 10.3–14.5)
SODIUM SERPL-SCNC: 143 MMOL/L — SIGNIFICANT CHANGE UP (ref 135–145)
WBC # BLD: 8.38 K/UL — SIGNIFICANT CHANGE UP (ref 3.8–10.5)
WBC # FLD AUTO: 8.38 K/UL — SIGNIFICANT CHANGE UP (ref 3.8–10.5)

## 2025-05-14 PROCEDURE — 99232 SBSQ HOSP IP/OBS MODERATE 35: CPT

## 2025-05-14 RX ORDER — POLYETHYLENE GLYCOL 3350 17 G/17G
17 POWDER, FOR SOLUTION ORAL DAILY
Refills: 0 | Status: DISCONTINUED | OUTPATIENT
Start: 2025-05-14 | End: 2025-05-26

## 2025-05-14 RX ADMIN — POLYETHYLENE GLYCOL 3350 17 GRAM(S): 17 POWDER, FOR SOLUTION ORAL at 12:12

## 2025-05-14 RX ADMIN — ATORVASTATIN CALCIUM 40 MILLIGRAM(S): 80 TABLET, FILM COATED ORAL at 21:18

## 2025-05-14 RX ADMIN — METOPROLOL SUCCINATE 25 MILLIGRAM(S): 50 TABLET, EXTENDED RELEASE ORAL at 05:16

## 2025-05-14 NOTE — PROGRESS NOTE ADULT - PROBLEM SELECTOR PLAN 2
WBC WNL    neutrophilic shift  T afebrile, reports subjective fever and chills x 1  week   CXR as above   RVP neg    s/p CTX in ED  ESR WNL; procal 0.3, CRP 6.2  Mycoplasma neg  - d/c abx WBC WNL, neutrophilic shift  T afebrile, reports subjective fever and chills x 1  week   CXR as above   RVP neg    s/p CTX in ED  ESR WNL; procal 0.3, CRP 6.2  Mycoplasma neg  - d/c abx

## 2025-05-14 NOTE — PROGRESS NOTE ADULT - PROBLEM SELECTOR PLAN 9
-bladder scan with straight cath occasionally > 500  -check post void residual with bladder scan  -If retention persists, will place fraire prior to DC.

## 2025-05-14 NOTE — PROGRESS NOTE ADULT - PROBLEM SELECTOR PLAN 1
p/w sob x 1 week   CXR -New small to moderate right pleural effusion and small left pleural effusion with adjacent airspace opacity  BNP 2.3 K   Appears mildly hypervolemic on lung exam, also appears dehydrated ( dry mucus membranes )   takes metoprolol, lasix, losartan as per superscripts   s/p lasix 40 in ER   -c/w toprol with holding parameters   -Remote tele  -Troponin neg x 1   -daily weights, strict I&O  -Cardiology consulted: Dr Lim p/w sob x 1 week   CXR -New small to moderate right pleural effusion and small left pleural effusion with adjacent airspace opacity  BNP 2.3 K   Appears mildly hypervolemic on lung exam, also appears dehydrated ( dry mucus membranes )   takes metoprolol, lasix, losartan as per superscripts   s/p lasix 40 in ER   -c/w toprol with holding parameters   -s/p tele  -Troponin neg x 1   -daily weights, strict I&O  -Cardiology consulted: Dr Lim

## 2025-05-14 NOTE — CHART NOTE - NSCHARTNOTEFT_GEN_A_CORE
EVENT: Notified by RN, pt with urinary retention.     HPI:  89/ F from home, [place], ambulates with a cane/ walker at baseline, AOx3 with PMHx  HTN, HLD, chronic LLE edema , afib on eliquis , recent PPM placement 3/25 presented to ER with c/o SOB on exertion, subjective fever, chills , non productive cough, chest congestion x 1 week. Labs s/o Hb 8 ( from 11.4), neutrophilic shift , BUN/ Cr 88/ 1.66 from ( 28/1.26) , lact 3.3, p BNP 2.3 K . Imaging c/w BL pleural effusions ? R sided infiltrate . S/p lasix 40 , CTX 2 g . Admitted to medicine for AHRF 2/2 CHF vs PNA.    Patient for LTC with comfort/palliative    SUBJECTIVE: Pt is awake and alert, [place]--no new complaints. Bladder scan revealed approximately 500 ml of urine in bladder.    OBJECTIVE:  Vital Signs Last 24 Hrs  T(C): 36.4 (14 May 2025 05:38), Max: 36.6 (13 May 2025 07:34)  T(F): 97.6 (14 May 2025 05:38), Max: 97.9 (13 May 2025 07:34)  HR: 74 (14 May 2025 05:38) (60 - 74)  BP: 157/57 (14 May 2025 05:38) (121/71 - 157/57)  BP(mean): 86 (13 May 2025 11:00) (86 - 91)  RR: 18 (14 May 2025 05:38) (18 - 18)  SpO2: 94% (14 May 2025 05:38) (92% - 100%)    Parameters below as of 14 May 2025 05:38  Patient On (Oxygen Delivery Method): nasal cannula  O2 Flow (L/min): 3    PHYSICAL EXAM:  Neuro: Awake and alert, oriented to person, place, and time  Cardiovascular: + S1, S2, no murmurs, rubs, or bruits  Respiratory: clear to auscultation bilaterally with good air entry   GI: Abdomen soft, non-tender, bowel sounds present   : distended   Skin: warm and dry; no rash      LABS:                        7.8    8.38  )-----------( 195      ( 14 May 2025 05:20 )             24.2     05-13    145  |  116[H]  |  49[H]  ----------------------------<  110[H]  4.2   |  23  |  1.37[H]    Ca    8.6      13 May 2025 07:38  Phos  3.5     05-13  Mg     2.2     05-13        EKG:   IMAGING:    ASSESSMENT/PROBLEM: Urinary retention     PLAN:     -Straight cath x 1  -Repeat bladder scan in 6-8 hours  -Continue current/supportive measures    FOLLOW UP / RESULT:     -F/u results of bladder scan

## 2025-05-14 NOTE — PROGRESS NOTE ADULT - PROBLEM SELECTOR PLAN 6
Pt w/ SCr BUN/ Cr 88/ 1.66 from ( 28/1.26)   likely prerenal   Follow BMP daily  - Keep patient euvolemic on Renal diet   - Avoid Nephrotoxic Meds/ Agents such as NSAIDs, IV contrast, Aminoglycosides such as gentamicin, Gadolinium contrast, Phosphate containing enemas among others  - Adjust Medications according to eGFR  - Monitor BMP daily, replace electrolytes as needed. - c/w atorvastatin 40 mg as  p er surescripts   - Lipid profile wnl  - Dash Diet

## 2025-05-14 NOTE — CHART NOTE - NSCHARTNOTEFT_GEN_A_CORE
Pt with urinary retention, noted Bladder scan/straight cath showing >410ml this afternoon. Placed Jarrett catheter to monitor.

## 2025-05-14 NOTE — PROGRESS NOTE ADULT - PROBLEM SELECTOR PLAN 4
h/o HTN on amlodipine, losartan, TOPROL as per surescripts   -Monitor BP Known hx of afib  Not in RVR  Pacemaker placed 3/2025   s/p PPM Interrogation-normal device functioning   recent TTE OP- echocardiogram outpatient that showed high pulmonary pressures and mitral valve regurgitation with left atrial enlargement.  Also noted possible clot around the pacemaker wire.   -cont home  toprol   -hold eliquis ivo Hb drop

## 2025-05-14 NOTE — PROGRESS NOTE ADULT - ASSESSMENT
Seen this morning, no new complaints still feels weak. She wants PT to ambulate with her.  I discussed being out of bed to chair that patient was resistant to. After discussing with her granddaughter, patient is more in agreement with out of bed to chair.  Still no bowel movement    PE: No distress, speaking complete sentences, abd sfot notnender, no lower ext edema    Assessment and Plan:    89 year old woman from home with extensive medical hx including HTN, HLD , A-fib on OAC, hx of recent PPM procedure, chronic LLE edema here with progressive SOB, dry cough with congestion in the last week. Clinical exam with bibasilar lung crackles and  Imaging studies show B/L pleural effusion but no pulmonary vascular congestion.  Concern for acute right sided heart failure likely worsened by mitral valve regurgitation with cardiorenal failure.  There is also concern for high output cardiac failure from anemia with recent drop from baseline hgb   Patient GOC completed on 5/10, DNR/DNI  Patient's Hgb has improved and stabilized following PRBC transfusions.   After pall cosult- patient for LTC with comfort/palliative    #AHRF 2/2  #Dyspnea, c/f CHF Exacerbation (HFpEF and severe pulmonary hypertension) (New Pleural Effusions)   #Anemia requiring transfusion, stable  #S/p PPM  #CAD  #BATSHEVA on CKD, improving  #AFIB, holding AC  #Acute urinary retention    -Pall care consult, -LTC with palliative/hospice  -stop antibiotics was given ceftriaxone/azithromycin initially due to concerns for community acquired pneumonia.  -stop checking labs- comfort measures only, continuing to monitor for hgb and transfusions is not in line with goals of care  -regarding TTE and imaging findings, per Cardiology here, very unlikely a thrombus would be seen on pacer wire from surface TTE alone, can ask patient if she desires JULIA but do not suspect this is within her desires as she has been clear to me she does not want invasive procedures  -pacemaker interrogated, DC tele monitoring  -ECHO reviewed  -Hold OAC given anemia, not in lines with GOC  -Found with urinary retention AM of 5/14, s/p straight cath, start miralax to encourage bowel movements. Continue monitoring, may require fraire catheter  -Dispo: LTC with hospice.  Assessment and Plan:    89 year old woman from home with extensive medical hx including HTN, HLD , A-fib on OAC, hx of recent PPM procedure, chronic LLE edema here with progressive SOB, dry cough with congestion in the last week. Clinical exam with bibasilar lung crackles and  Imaging studies show B/L pleural effusion but no pulmonary vascular congestion.  Concern for acute right sided heart failure likely worsened by mitral valve regurgitation with cardiorenal failure.  There is also concern for high output cardiac failure from anemia with recent drop from baseline hgb   Patient GOC completed on 5/10, DNR/DNI  Patient's Hgb has improved and stabilized following PRBC transfusions.   After pall cosult- patient for LTC with comfort/palliative    #AHRF 2/2  #Dyspnea, c/f CHF Exacerbation (HFpEF and severe pulmonary hypertension) (New Pleural Effusions)   #Anemia requiring transfusion, stable  #S/p PPM  #CAD  #BATSHEVA on CKD, improving  #AFIB, holding AC  #Acute urinary retention    -Pall care consult, -LTC with palliative/hospice  -stop antibiotics was given ceftriaxone/azithromycin initially due to concerns for community acquired pneumonia.  -stop checking labs- comfort measures only, continuing to monitor for hgb and transfusions is not in line with goals of care  -regarding TTE and imaging findings, per Cardiology here, very unlikely a thrombus would be seen on pacer wire from surface TTE alone, can ask patient if she desires JULIA but do not suspect this is within her desires as she has been clear to me she does not want invasive procedures  -pacemaker interrogated, DC tele monitoring  -ECHO reviewed  -Hold OAC given anemia, not in lines with GOC  -Found with urinary retention AM of 5/14, s/p straight cath, start miralax to encourage bowel movements. Continue monitoring, may require fraire catheter  -Dispo: LTC with hospice.

## 2025-05-14 NOTE — PROGRESS NOTE ADULT - SUBJECTIVE AND OBJECTIVE BOX
INTERVAL HPI/OVERNIGHT EVENTS:   Seen this morning, Marivel was sitting out of bed to chair. SHe reports no pain and she feels well now that she is out of bed to chair.  Noted to have urinary retention this morning, she understands and reports she does not have an urge to urinate    REVIEW OF SYSTEMS:  negative except per hpi    Vital Signs Last 24 Hrs  T(C): 36.4 (14 May 2025 05:38), Max: 36.6 (13 May 2025 19:50)  T(F): 97.6 (14 May 2025 05:38), Max: 97.9 (13 May 2025 19:50)  HR: 74 (14 May 2025 05:38) (60 - 74)  BP: 157/57 (14 May 2025 05:38) (126/48 - 157/57)  BP(mean): --  RR: 18 (14 May 2025 05:38) (18 - 18)  SpO2: 94% (14 May 2025 05:38) (92% - 99%)    Parameters below as of 14 May 2025 05:38  Patient On (Oxygen Delivery Method): nasal cannula  O2 Flow (L/min): 3      PHYSICAL EXAMINATION:  elderly female, appears pale, on nasal cannula, lungs grossly clear, abd soft nontender                        7.8    8.38  )-----------( 195      ( 14 May 2025 05:20 )             24.2     05-14    143  |  114[H]  |  42[H]  ----------------------------<  102[H]  3.5   |  21[L]  |  1.22    Ca    8.7      14 May 2025 05:20  Phos  2.9     05-14  Mg     2.2     05-14                CAPILLARY BLOOD GLUCOSE      RADIOLOGY & ADDITIONAL TESTS:

## 2025-05-14 NOTE — PROGRESS NOTE ADULT - PROBLEM SELECTOR PLAN 5
- c/w atorvastatin 40 mg as  p er surescripts   - Lipid profile wnl  - Dash Diet h/o HTN on amlodipine, losartan, TOPROL as per surescripts   -Monitor BP

## 2025-05-14 NOTE — PROGRESS NOTE ADULT - PROBLEM SELECTOR PLAN 8
DVT ppx-hold eliquis iso bleed, SCD  DNR/DNI - palliative care following    Discharge planning : LTC with hospice VSS   no active external bleed   remote Hx of UGIB in the past   - Maintain active T&S, 2 large bore peripheral IVs, transfuse for goal hgb >7 or if symptomatic  hold eliquis  - s/p 1 u pRBC on 5/11  post-transfusion hgb stable.

## 2025-05-14 NOTE — PROGRESS NOTE ADULT - PROBLEM SELECTOR PLAN 3
Known hx of afib  Not in RVR  Pacemaker placed 3/2025   As per ER sign out   recent TTE OP- echocardiogram outpatient that showed high pulmonary pressures and mitral valve regurgitation with left atrial enlargement.  Also noted possible clot around the pacemaker wire.   -cont home  toprol   -hold eliquis ivo Hb drop  resume if Hb stable    [ ] obtain OP records in AM -Echo with EF 50 to 55 %, moderate (grade 2) left ventricular diastolic dysfunction. Moderate MR, TR and AZ. Elevated pulmonary artery pressure.  -s/p IV lasix  -c/w toprol with holding parameters   -See above

## 2025-05-14 NOTE — PROGRESS NOTE ADULT - PROBLEM SELECTOR PLAN 7
Please let patient know this was very normal, it will take several months for those nerves to heal and for that area to become less sensitive.  She can try putting a light dressing over the incision while wearing pants and massaging incision with vitamin E lotion over-the-counter   VSS   no active external bleed   remote Hx of UGIB in the past   - Maintain active T&S, 2 large bore peripheral IVs, transfuse for goal hgb >7 or if symptomatic  hold eliquis  - s/p 1 u pRBC on 5/11  post-transfusion hgb stable. Pt w/ SCr BUN/ Cr 88/ 1.66 from ( 28/1.26)   likely prerenal   Follow BMP daily  - Keep patient euvolemic on Renal diet   - Avoid Nephrotoxic Meds/ Agents such as NSAIDs, IV contrast, Aminoglycosides such as gentamicin, Gadolinium contrast, Phosphate containing enemas among others  - Adjust Medications according to eGFR  - Monitor BMP daily, replace electrolytes as needed.

## 2025-05-14 NOTE — PROGRESS NOTE ADULT - SUBJECTIVE AND OBJECTIVE BOX
NP Note discussed with  Primary Attending    INTERVAL HPI/OVERNIGHT EVENTS: Patient seen and examined at bedside. Denies any new complaints  MEDICATIONS  (STANDING):  atorvastatin 40 milliGRAM(s) Oral at bedtime  metoprolol succinate ER 25 milliGRAM(s) Oral daily  polyethylene glycol 3350 17 Gram(s) Oral daily    MEDICATIONS  (PRN):  acetaminophen     Tablet .. 650 milliGRAM(s) Oral every 6 hours PRN Temp greater or equal to 38C (100.4F), Mild Pain (1 - 3)  benzocaine/menthol Lozenge 1 Lozenge Oral every 6 hours PRN cough  guaiFENesin Oral Liquid (Sugar-Free) 200 milliGRAM(s) Oral every 6 hours PRN Cough      __________________________________________________  REVIEW OF SYSTEMS:    CONSTITUTIONAL: No fever,   EYES: no acute visual disturbances  NECK: No pain or stiffness  RESPIRATORY: No cough; No shortness of breath  CARDIOVASCULAR: No chest pain, no palpitations  GASTROINTESTINAL: No pain. No nausea or vomiting; No diarrhea   NEUROLOGICAL: No headache or numbness, no tremors  MUSCULOSKELETAL: No joint pain, no muscle pain  GENITOURINARY: no dysuria, no frequency, no hesitancy  PSYCHIATRY: no depression , no anxiety  ALL OTHER  ROS negative        Vital Signs Last 24 Hrs  T(C): 36.5 (14 May 2025 11:49), Max: 36.6 (13 May 2025 19:50)  T(F): 97.7 (14 May 2025 11:49), Max: 97.9 (13 May 2025 19:50)  HR: 60 (14 May 2025 11:49) (60 - 74)  BP: 130/57 (14 May 2025 11:49) (129/56 - 157/57)  BP(mean): --  RR: 17 (14 May 2025 11:49) (17 - 18)  SpO2: 98% (14 May 2025 11:49) (94% - 99%)    Parameters below as of 14 May 2025 11:49  Patient On (Oxygen Delivery Method): nasal cannula  O2 Flow (L/min): 2      ________________________________________________  PHYSICAL EXAM:  GENERAL: NAD, lying comfortably in bed  HEAD:  Atraumatic, Normocephalic  EYES:  PERRLA,  conjunctiva and sclera clear  NECK: Supple,    CHEST WALL: Nontender  LUNG: mild BL basilar crackles, R>L; saturating well on 2L  HEART: Regular rate and rhythm; No murmurs,  ABDOMEN: Soft, Nontender, Nondistended; Bowel sounds present, No Rovsing's sign   NERVOUS SYSTEM:  Alert & Oriented X 2-3  EXTREMITIES: +LLE edema (chronic)  CALF: No Calf tenderness   SKIN: warm dry    _________________________________________________  LABS:                        7.8    8.38  )-----------( 195      ( 14 May 2025 05:20 )             24.2     05-14    143  |  114[H]  |  42[H]  ----------------------------<  102[H]  3.5   |  21[L]  |  1.22    Ca    8.7      14 May 2025 05:20  Phos  2.9     05-14  Mg     2.2     05-14        Urinalysis Basic - ( 14 May 2025 05:20 )    Color: x / Appearance: x / SG: x / pH: x  Gluc: 102 mg/dL / Ketone: x  / Bili: x / Urobili: x   Blood: x / Protein: x / Nitrite: x   Leuk Esterase: x / RBC: x / WBC x   Sq Epi: x / Non Sq Epi: x / Bacteria: x      CAPILLARY BLOOD GLUCOSE  RADIOLOGY & ADDITIONAL TESTS:    Imaging  Reviewed:  YES    < from: CT Abdomen and Pelvis No Cont (05.11.25 @ 18:36) >    ACC: [id number] EXAM:  CT ABDOMEN AND PELVIS   ORDERED BY: PHANI HOFF     PROCEDURE DATE:  05/11/2025          INTERPRETATION:  CLINICAL INFORMATION: Evaluate for hematoma.    COMPARISON: CT abdomen and pelvis dated 8/5/2019 and chestCT dated   5/9/2025.    CONTRAST/COMPLICATIONS:  IV Contrast: NONE  Oral Contrast: NONE    PROCEDURE:  CT of the Abdomen and Pelvis was performed.  Sagittal and coronal reformats were performed.    FINDINGS:  Evaluation of the solid organs and vasculature is somewhat limited due to   lack of intravenous contrast.    LOWER CHEST: Small moderate right and small left pleural effusions,   slightly increase in size from 5/9/2025. There is subtle dependent high   attenuation within the right pleural effusion (2-1) incompletely included   in the field-of-view and therefore limited in evaluation. Cardiomegaly   with coronary artery calcifications and mitral valve annular   calcifications.    LIVER: Within normal limits.  BILE DUCTS: Normal caliber.  GALLBLADDER: Mildly thick-walled, though underdistended.  SPLEEN: Within normal limits.  PANCREAS: Within normal limits.  ADRENALS: Similar size of a 1.5 x 2.4 cm right adrenal adenoma (2-33) and   C5 2018, requiring no further evaluation or follow-up. Mildly thickened   left adrenal gland, also chronic.  KIDNEYS/URETERS: There is a 1.4 cm exophytic posterior right upper pole   renal lesion measuring 69 Hounsfield units (2-44), previously a simple   cyst on 8/5/2018 and therefore favored representinterval development of   a hemorrhagic or proteinaceous debris. There is also a 1.5 cm hyperdense   right upper pole cyst (2-38) measuring 86 Hounsfield units in density.   There are a few additional probable bilateral renal cysts. There are   vascular calcifications within the bilateral renal sinus, limiting   evaluation for calculi. No hydronephrosis. There appears to be some   retained contrast within the renal cortex.    BLADDER: Excreted contrast is present within the urinary bladder.  REPRODUCTIVE ORGANS: Status post hysterectomy.    BOWEL: No bowel obstruction. Appendix is not definitely identified. A   left lower quadrant colonic anastomosis is present without evidence of   obstruction.  PERITONEUM/RETROPERITONEUM: Within normal limits.  VESSELS: Extensive calcified atherosclerosis of the abdominal aorta,   which is also tortuous. There are 2 femoral-femoral bypass grafts. No   aortic aneurysm.  LYMPH NODES: No lymphadenopathy.  ABDOMINAL WALL: Within normal limits.  BONES: The bones are markedly osteopenic and there are advanced   degenerative changes of the spine with partial, stricture of the mid   lumbar spine. Old healed left-sided rib fracture deformities are present.    IMPRESSION:  1. No evidence of intra-abdominal hematoma.  2. Probable subtle retained renal cortical contrast, suggestive of acute   tubular necrosis (ATN).  3. Slight increase in size of the moderate right and small left pleural   effusions with adjacent atelectasis.  4. There is new trace high attenuation within the upper aspect of the   right pleural effusion, which is nonspecific.    --- End of Report ---            JES COOK MD; Attending Radiologist  This document has been electronically signed. May 11 2025  9:00PM    < end of copied text >  < from: CT Angio Chest PE Protocol w/ IV Cont (05.09.25 @ 19:52) >    ACC: [id number] EXAM:  CT ANGIO CHEST PULM [facility]   ORDERED BY: DANIELLE BEACH     PROCEDURE DATE:  05/09/2025          INTERPRETATION:  CLINICAL INFORMATION: Rule out pulmonary embolism.    COMPARISON: CT abdomen and pelvis dated 8/5/2018 and CT angiogram of the   chest dated 5/15/2019.    CONTRAST/COMPLICATIONS:  IV Contrast: Omnipaque 350  66 cc administered   37 cc discarded  Oral Contrast: NONE  Complications: None documented at time of interpretation.    PROCEDURE:  CT Angiography of the Chest.  Sagittal and coronal reformats were performed as well as 3D (MIP)   reconstructions.    FINDINGS:    LUNGS AND LARGE AIRWAYS: Patent central airways. ] Of the left bibasilar   atelectasis, particularly against the effusions. Mild upper lobe  predominant centrilobular emphysema. There is a small benign-appearing   right minor fissure-based lymph node (14-92).  PLEURA: Small moderate right pleural effusion and very small left pleural   effusion, new from prior study. No pneumothorax.  VESSELS: The pulmonary arteries are well opacified and evaluated without   evidence of filling defect. There is extensive calcified atherosclerosis   of the thoracic aorta. No aortic aneurysm.  HEART: Cardiomegaly. Extensive coronary artery calcification is are   present. No pericardial effusion.  MEDIASTINUM AND MACY: No lymphadenopathy.  CHEST WALL AND LOWER NECK: There is a 1.5 cm left low-attenuation thyroid   nodule versus cyst (5-24). A right-sided single lead pacemaker is present   with tip in the right ventricular apex.  VISUALIZED UPPER ABDOMEN: Unchanged 1.2 x 2.1 cm right adrenal nodule,   likely a benign adenoma. There is also chronic mild thickening of the   left adrenal gland. There are several partially visualized right probable   renal cysts as well as a 1.2 cm high attenuation structure at the upper   pole (5-154), previously measuring 1.0 cm on 5/15/2018 and possibly   represent an a hyperdense cyst or indolent/very slow growing solid lesion   of doubtful significance in this patient.  BONES: The bones are diffusely osteopenic and there are multilevel   degenerative changes of the spine. Probable old healed posterior medial   left 11th rib fracture deformity () and a few additional old healed   left rib fracture deformities.    IMPRESSION:  1. No evidence of pulmonary embolism.  2. New small to moderate right pleural effusion and small left pleural effusion with adjacent airspace opacity, favored to represent   atelectasis. Infection is considered less likely, though is possible in the appropriate clinical setting.  3. Additional chronic and incidental findings are present as detailed   above.    --- End of Report ---  JES COOK MD; Attending Radiologist  This document has been electronically signed. May  9 2025  8:20PM    < end of copied text >    Consultant(s) Notes Reviewed:   YES      Plan of care was discussed with patient and /or primary care giver; all questions and concerns were addressed  NP Note discussed with  Primary Attending    INTERVAL HPI/OVERNIGHT EVENTS: Patient seen and examined at bedside. Denies any new complaints  MEDICATIONS  (STANDING):  atorvastatin 40 milliGRAM(s) Oral at bedtime  metoprolol succinate ER 25 milliGRAM(s) Oral daily  polyethylene glycol 3350 17 Gram(s) Oral daily    MEDICATIONS  (PRN):  acetaminophen     Tablet .. 650 milliGRAM(s) Oral every 6 hours PRN Temp greater or equal to 38C (100.4F), Mild Pain (1 - 3)  benzocaine/menthol Lozenge 1 Lozenge Oral every 6 hours PRN cough  guaiFENesin Oral Liquid (Sugar-Free) 200 milliGRAM(s) Oral every 6 hours PRN Cough      __________________________________________________  REVIEW OF SYSTEMS:    CONSTITUTIONAL: No fever,   EYES: no acute visual disturbances  NECK: No pain or stiffness  RESPIRATORY: No cough; No shortness of breath  CARDIOVASCULAR: No chest pain, no palpitations  GASTROINTESTINAL: No pain. No nausea or vomiting; No diarrhea   NEUROLOGICAL: No headache or numbness, no tremors  MUSCULOSKELETAL: No joint pain, no muscle pain  GENITOURINARY: no dysuria, no frequency, no hesitancy  PSYCHIATRY: no depression , no anxiety  ALL OTHER  ROS negative        Vital Signs Last 24 Hrs  T(C): 36.5 (14 May 2025 11:49), Max: 36.6 (13 May 2025 19:50)  T(F): 97.7 (14 May 2025 11:49), Max: 97.9 (13 May 2025 19:50)  HR: 60 (14 May 2025 11:49) (60 - 74)  BP: 130/57 (14 May 2025 11:49) (129/56 - 157/57)  BP(mean): --  RR: 17 (14 May 2025 11:49) (17 - 18)  SpO2: 98% (14 May 2025 11:49) (94% - 99%)    Parameters below as of 14 May 2025 11:49  Patient On (Oxygen Delivery Method): nasal cannula  O2 Flow (L/min): 2      ________________________________________________  PHYSICAL EXAM:  GENERAL: NAD, lying comfortably in bed  HEAD:  Atraumatic, Normocephalic  EYES:  PERRLA,  conjunctiva and sclera clear  NECK: Supple,    CHEST WALL: Nontender  LUNG: mild BL basilar crackles, R>L; saturating well on 2L  HEART: Regular rate and rhythm; No murmurs,  ABDOMEN: Soft, Nontender, Nondistended; Bowel sounds present, No Rovsing's sign   NERVOUS SYSTEM:  Alert & Oriented X 2-3  EXTREMITIES: +LLE edema (chronic)  CALF: No Calf tenderness   SKIN: warm dry    _________________________________________________  LABS:                        7.8    8.38  )-----------( 195      ( 14 May 2025 05:20 )             24.2     05-14    143  |  114[H]  |  42[H]  ----------------------------<  102[H]  3.5   |  21[L]  |  1.22    Ca    8.7      14 May 2025 05:20  Phos  2.9     05-14  Mg     2.2     05-14        Urinalysis Basic - ( 14 May 2025 05:20 )    Color: x / Appearance: x / SG: x / pH: x  Gluc: 102 mg/dL / Ketone: x  / Bili: x / Urobili: x   Blood: x / Protein: x / Nitrite: x   Leuk Esterase: x / RBC: x / WBC x   Sq Epi: x / Non Sq Epi: x / Bacteria: x      CAPILLARY BLOOD GLUCOSE  RADIOLOGY & ADDITIONAL TESTS:    Imaging  Reviewed:  YES    < from: CT Abdomen and Pelvis No Cont (05.11.25 @ 18:36) >    ACC: [id number] EXAM:  CT ABDOMEN AND PELVIS   ORDERED BY: PHANI HOFF     PROCEDURE DATE:  05/11/2025          INTERPRETATION:  CLINICAL INFORMATION: Evaluate for hematoma.    COMPARISON: CT abdomen and pelvis dated 8/5/2019 and chestCT dated   5/9/2025.    CONTRAST/COMPLICATIONS:  IV Contrast: NONE  Oral Contrast: NONE    PROCEDURE:  CT of the Abdomen and Pelvis was performed.  Sagittal and coronal reformats were performed.    FINDINGS:  Evaluation of the solid organs and vasculature is somewhat limited due to   lack of intravenous contrast.    LOWER CHEST: Small moderate right and small left pleural effusions,   slightly increase in size from 5/9/2025. There is subtle dependent high   attenuation within the right pleural effusion (2-1) incompletely included   in the field-of-view and therefore limited in evaluation. Cardiomegaly   with coronary artery calcifications and mitral valve annular   calcifications.    LIVER: Within normal limits.  BILE DUCTS: Normal caliber.  GALLBLADDER: Mildly thick-walled, though underdistended.  SPLEEN: Within normal limits.  PANCREAS: Within normal limits.  ADRENALS: Similar size of a 1.5 x 2.4 cm right adrenal adenoma (2-33) and   C5 2018, requiring no further evaluation or follow-up. Mildly thickened   left adrenal gland, also chronic.  KIDNEYS/URETERS: There is a 1.4 cm exophytic posterior right upper pole   renal lesion measuring 69 Hounsfield units (2-44), previously a simple   cyst on 8/5/2018 and therefore favored representinterval development of   a hemorrhagic or proteinaceous debris. There is also a 1.5 cm hyperdense   right upper pole cyst (2-38) measuring 86 Hounsfield units in density.   There are a few additional probable bilateral renal cysts. There are   vascular calcifications within the bilateral renal sinus, limiting   evaluation for calculi. No hydronephrosis. There appears to be some   retained contrast within the renal cortex.    BLADDER: Excreted contrast is present within the urinary bladder.  REPRODUCTIVE ORGANS: Status post hysterectomy.    BOWEL: No bowel obstruction. Appendix is not definitely identified. A   left lower quadrant colonic anastomosis is present without evidence of   obstruction.  PERITONEUM/RETROPERITONEUM: Within normal limits.  VESSELS: Extensive calcified atherosclerosis of the abdominal aorta,   which is also tortuous. There are 2 femoral-femoral bypass grafts. No   aortic aneurysm.  LYMPH NODES: No lymphadenopathy.  ABDOMINAL WALL: Within normal limits.  BONES: The bones are markedly osteopenic and there are advanced   degenerative changes of the spine with partial, stricture of the mid   lumbar spine. Old healed left-sided rib fracture deformities are present.    IMPRESSION:  1. No evidence of intra-abdominal hematoma.  2. Probable subtle retained renal cortical contrast, suggestive of acute   tubular necrosis (ATN).  3. Slight increase in size of the moderate right and small left pleural   effusions with adjacent atelectasis.  4. There is new trace high attenuation within the upper aspect of the   right pleural effusion, which is nonspecific.    --- End of Report ---            JES COOK MD; Attending Radiologist  This document has been electronically signed. May 11 2025  9:00PM    < end of copied text >    ECHO:< from: TTE W or WO Ultrasound Enhancing Agent (05.10.25 @ 10:41) >     CONCLUSIONS:      1. Left ventricular cavity is small. Left ventricular systolic function is normal with an ejection fraction visually estimated at 50 to 55 %. There are no regional wall motion abnormalities seen. D shaped septum indicative of RV pressure/volume overload.   2. There is increased LV mass and concentric hypertrophy.   3. Mild left ventricular hypertrophy.   4. There is moderate (grade 2) left ventricular diastolic dysfunction.   5. Based on visual assessment, the right ventricle appears mildly enlarged. normal wall thickness, and reduced right ventricular systolic function. Tricuspid annular plane systolic excursion (TAPSE) is 1.5 cm (normal >=1.7 cm).   6. Left atrium is severely dilated.   7. The right atrium is severely dilated.   8. Moderate mitral regurgitation.   9. Normal left and right atrial size.  10. Moderate tricuspid regurgitation.  11. Moderate pulmonic regurgitation.  12. Estimated pulmonary artery systolic pressure is 69 mmHg, consistent with elevated pulmonary artery pressure.  13. The inferior vena cava is normal in size measuring 1.80 cm in diameter, (normal <2.1cm) with abnormal inspiratory collapse (abnormal <50%) consistent with mildly elevated right atrial pressure (~8, range 5-10mmHg).  14. No pericardial effusion seen.  15. No prior echocardiogram is available for comparison.      < from: CT Angio Chest PE Protocol w/ IV Cont (05.09.25 @ 19:52) >    ACC: [id number] EXAM:  CT ANGIO CHEST PULM [facility]   ORDERED BY: DANIELLE BEACH     PROCEDURE DATE:  05/09/2025          INTERPRETATION:  CLINICAL INFORMATION: Rule out pulmonary embolism.    COMPARISON: CT abdomen and pelvis dated 8/5/2018 and CT angiogram of the   chest dated 5/15/2019.    CONTRAST/COMPLICATIONS:  IV Contrast: Omnipaque 350  66 cc administered   37 cc discarded  Oral Contrast: NONE  Complications: None documented at time of interpretation.    PROCEDURE:  CT Angiography of the Chest.  Sagittal and coronal reformats were performed as well as 3D (MIP)   reconstructions.    FINDINGS:    LUNGS AND LARGE AIRWAYS: Patent central airways. ] Of the left bibasilar   atelectasis, particularly against the effusions. Mild upper lobe  predominant centrilobular emphysema. There is a small benign-appearing   right minor fissure-based lymph node (14-92).  PLEURA: Small moderate right pleural effusion and very small left pleural   effusion, new from prior study. No pneumothorax.  VESSELS: The pulmonary arteries are well opacified and evaluated without   evidence of filling defect. There is extensive calcified atherosclerosis   of the thoracic aorta. No aortic aneurysm.  HEART: Cardiomegaly. Extensive coronary artery calcification is are   present. No pericardial effusion.  MEDIASTINUM AND MACY: No lymphadenopathy.  CHEST WALL AND LOWER NECK: There is a 1.5 cm left low-attenuation thyroid   nodule versus cyst (5-24). A right-sided single lead pacemaker is present   with tip in the right ventricular apex.  VISUALIZED UPPER ABDOMEN: Unchanged 1.2 x 2.1 cm right adrenal nodule,   likely a benign adenoma. There is also chronic mild thickening of the   left adrenal gland. There are several partially visualized right probable   renal cysts as well as a 1.2 cm high attenuation structure at the upper   pole (5-154), previously measuring 1.0 cm on 5/15/2018 and possibly   represent an a hyperdense cyst or indolent/very slow growing solid lesion   of doubtful significance in this patient.  BONES: The bones are diffusely osteopenic and there are multilevel   degenerative changes of the spine. Probable old healed posterior medial   left 11th rib fracture deformity () and a few additional old healed   left rib fracture deformities.    IMPRESSION:  1. No evidence of pulmonary embolism.  2. New small to moderate right pleural effusion and small left pleural effusion with adjacent airspace opacity, favored to represent   atelectasis. Infection is considered less likely, though is possible in the appropriate clinical setting.  3. Additional chronic and incidental findings are present as detailed   above.    --- End of Report ---  JES COOK MD; Attending Radiologist  This document has been electronically signed. May  9 2025  8:20PM    < end of copied text >    Consultant(s) Notes Reviewed:   YES      Plan of care was discussed with patient and /or primary care giver; all questions and concerns were addressed

## 2025-05-14 NOTE — CHART NOTE - NSCHARTNOTEFT_GEN_A_CORE
Thank you for allowing us to participate in your patient's care.  Goals are clear and symptoms controlled.  Will sign off at this time however please don't hesitate to call back with any questions or concerns.

## 2025-05-14 NOTE — PROGRESS NOTE ADULT - ASSESSMENT
From: Melonie Coker  To: Teresa Gallo  Sent: 7/23/2024 7:25 AM CDT  Subject: Mammogram order    Good morning,    I'm going to schedule my mammogram, it has to be after September 15, 2024. The current order on file expires 8/17/24. Can you please write another order?     [facility] ALESSANDRA 2D+3D SCREENING BILAT (CPT=77067/85651) [[id number]] 09/02/23 08/17/24   89/ F from home, [place], ambulates with a cane/ walker at baseline, AOx 3 with PMHx  HTN, HLD, chronic LLE edema , afib on eliquis , recent PPM placement 3/25 presented to ER with c/o SOB on exertion, subjective fever, chills , non productive cough, chest congestion x 1 week. Labs s/o Hb 8 ( from 11.4), neutrophilic shift , BUN/ Cr 88/ 1.66 from ( 28/1.26) , lact 3.3, p BNP 2.3 K . Imaging c/w BL pleural effusions ? R sided infiltrate . S/p lasix 40 , CTX 2 g .   Admitted to medicine for AHRF 2/2 CHF and Anemia requiring transfusion. Palliative consulted. Plan DC to LTC with hospice.

## 2025-05-15 LAB
ANION GAP SERPL CALC-SCNC: 6 MMOL/L — SIGNIFICANT CHANGE UP (ref 5–17)
BUN SERPL-MCNC: 41 MG/DL — HIGH (ref 7–18)
CALCIUM SERPL-MCNC: 8.5 MG/DL — SIGNIFICANT CHANGE UP (ref 8.4–10.5)
CHLORIDE SERPL-SCNC: 113 MMOL/L — HIGH (ref 96–108)
CO2 SERPL-SCNC: 23 MMOL/L — SIGNIFICANT CHANGE UP (ref 22–31)
CREAT SERPL-MCNC: 1.25 MG/DL — SIGNIFICANT CHANGE UP (ref 0.5–1.3)
CULTURE RESULTS: SIGNIFICANT CHANGE UP
CULTURE RESULTS: SIGNIFICANT CHANGE UP
EGFR: 39 ML/MIN/1.73M2 — LOW
EGFR: 39 ML/MIN/1.73M2 — LOW
GLUCOSE SERPL-MCNC: 103 MG/DL — HIGH (ref 70–99)
HCT VFR BLD CALC: 24 % — LOW (ref 34.5–45)
HGB BLD-MCNC: 7.7 G/DL — LOW (ref 11.5–15.5)
MCHC RBC-ENTMCNC: 27.5 PG — SIGNIFICANT CHANGE UP (ref 27–34)
MCHC RBC-ENTMCNC: 32.1 G/DL — SIGNIFICANT CHANGE UP (ref 32–36)
MCV RBC AUTO: 85.7 FL — SIGNIFICANT CHANGE UP (ref 80–100)
NRBC BLD AUTO-RTO: 0 /100 WBCS — SIGNIFICANT CHANGE UP (ref 0–0)
PLATELET # BLD AUTO: 191 K/UL — SIGNIFICANT CHANGE UP (ref 150–400)
POTASSIUM SERPL-MCNC: 4.1 MMOL/L — SIGNIFICANT CHANGE UP (ref 3.5–5.3)
POTASSIUM SERPL-SCNC: 4.1 MMOL/L — SIGNIFICANT CHANGE UP (ref 3.5–5.3)
RBC # BLD: 2.8 M/UL — LOW (ref 3.8–5.2)
RBC # FLD: 18.1 % — HIGH (ref 10.3–14.5)
SODIUM SERPL-SCNC: 142 MMOL/L — SIGNIFICANT CHANGE UP (ref 135–145)
SPECIMEN SOURCE: SIGNIFICANT CHANGE UP
SPECIMEN SOURCE: SIGNIFICANT CHANGE UP
WBC # BLD: 7.57 K/UL — SIGNIFICANT CHANGE UP (ref 3.8–10.5)
WBC # FLD AUTO: 7.57 K/UL — SIGNIFICANT CHANGE UP (ref 3.8–10.5)

## 2025-05-15 PROCEDURE — 99233 SBSQ HOSP IP/OBS HIGH 50: CPT

## 2025-05-15 RX ADMIN — POLYETHYLENE GLYCOL 3350 17 GRAM(S): 17 POWDER, FOR SOLUTION ORAL at 12:37

## 2025-05-15 RX ADMIN — ATORVASTATIN CALCIUM 40 MILLIGRAM(S): 80 TABLET, FILM COATED ORAL at 21:55

## 2025-05-15 RX ADMIN — METOPROLOL SUCCINATE 25 MILLIGRAM(S): 50 TABLET, EXTENDED RELEASE ORAL at 06:15

## 2025-05-15 NOTE — PHYSICAL THERAPY INITIAL EVALUATION ADULT - PLANNED THERAPY INTERVENTIONS, PT EVAL
balance training/gait training/neuromuscular re-education/postural re-education/strengthening/transfer training
balance training/gait training/strengthening/transfer training

## 2025-05-15 NOTE — PROGRESS NOTE ADULT - SUBJECTIVE AND OBJECTIVE BOX
NP Note discussed with  Primary Attending    INTERVAL HPI/OVERNIGHT EVENTS: no new complaints, no overnight event    MEDICATIONS  (STANDING):  atorvastatin 40 milliGRAM(s) Oral at bedtime  metoprolol succinate ER 25 milliGRAM(s) Oral daily  polyethylene glycol 3350 17 Gram(s) Oral daily    MEDICATIONS  (PRN):  acetaminophen     Tablet .. 650 milliGRAM(s) Oral every 6 hours PRN Temp greater or equal to 38C (100.4F), Mild Pain (1 - 3)  benzocaine/menthol Lozenge 1 Lozenge Oral every 6 hours PRN cough  guaiFENesin Oral Liquid (Sugar-Free) 200 milliGRAM(s) Oral every 6 hours PRN Cough      __________________________________________________  REVIEW OF SYSTEMS:    CONSTITUTIONAL: No fever,   EYES: no acute visual disturbances  NECK: No pain or stiffness  RESPIRATORY: No cough; No shortness of breath  CARDIOVASCULAR: No chest pain, no palpitations  GASTROINTESTINAL: No pain. No nausea or vomiting; No diarrhea   NEUROLOGICAL: No headache or numbness, no tremors  MUSCULOSKELETAL: No joint pain, no muscle pain  GENITOURINARY: no dysuria, no frequency, no hesitancy  PSYCHIATRY: no depression , no anxiety  ALL OTHER  ROS negative        Vital Signs Last 24 Hrs  T(C): 36.3 (15 May 2025 11:34), Max: 37.1 (15 May 2025 05:30)  T(F): 97.3 (15 May 2025 11:34), Max: 98.7 (15 May 2025 05:30)  HR: 59 (15 May 2025 11:34) (59 - 64)  BP: 118/57 (15 May 2025 11:34) (118/57 - 154/70)  BP(mean): --  RR: 18 (15 May 2025 11:34) (17 - 18)  SpO2: 98% (15 May 2025 11:34) (97% - 99%)    Parameters below as of 15 May 2025 11:34  Patient On (Oxygen Delivery Method): nasal cannula  O2 Flow (L/min): 2      ________________________________________________  PHYSICAL EXAM:  GENERAL: NAD, [place]  HEENT: Normocephalic;  conjunctivae and sclerae clear; moist mucous membranes;   NECK : supple  CHEST/LUNG: Clear to auscultation bilaterally with good air entry   HEART: S1 S2  regular; no murmurs, gallops or rubs  ABDOMEN: Soft, Nontender, Nondistended; Bowel sounds present  EXTREMITIES: no cyanosis; no edema; no calf tenderness  SKIN: warm and dry; no rash  : Jarrett in place with clear urine output  NERVOUS SYSTEM:  Awake and alert; Oriented  to place, person and forgetful w/ time ; no new deficits    _________________________________________________  LABS:                        7.7    7.57  )-----------( 191      ( 15 May 2025 05:25 )             24.0     05-15    142  |  113[H]  |  41[H]  ----------------------------<  103[H]  4.1   |  23  |  1.25    Ca    8.5      15 May 2025 05:25  Phos  2.9     05-14  Mg     2.2     05-14        Urinalysis Basic - ( 15 May 2025 05:25 )    Color: x / Appearance: x / SG: x / pH: x  Gluc: 103 mg/dL / Ketone: x  / Bili: x / Urobili: x   Blood: x / Protein: x / Nitrite: x   Leuk Esterase: x / RBC: x / WBC x   Sq Epi: x / Non Sq Epi: x / Bacteria: x      CAPILLARY BLOOD GLUCOSE    RADIOLOGY & ADDITIONAL TESTS:    Imaging  Reviewed:  YES    < from: CT Abdomen and Pelvis No Cont (05.11.25 @ 18:36) >    ACC: [id number] EXAM:  CT ABDOMEN AND PELVIS   ORDERED BY: PHANI HOFF     PROCEDURE DATE:  05/11/2025          INTERPRETATION:  CLINICAL INFORMATION: Evaluate for hematoma.    COMPARISON: CT abdomen and pelvis dated 8/5/2019 and chestCT dated   5/9/2025.    CONTRAST/COMPLICATIONS:  IV Contrast: NONE  Oral Contrast: NONE    PROCEDURE:  CT of the Abdomen and Pelvis was performed.  Sagittal and coronal reformats were performed.    FINDINGS:  Evaluation of the solid organs and vasculature is somewhat limited due to   lack of intravenous contrast.    LOWER CHEST: Small moderate right and small left pleural effusions,   slightly increase in size from 5/9/2025. There is subtle dependent high   attenuation within the right pleural effusion (2-1) incompletely included   in the field-of-view and therefore limited in evaluation. Cardiomegaly   with coronary artery calcifications and mitral valve annular   calcifications.    LIVER: Within normal limits.  BILE DUCTS: Normal caliber.  GALLBLADDER: Mildly thick-walled, though underdistended.  SPLEEN: Within normal limits.  PANCREAS: Within normal limits.  ADRENALS: Similar size of a 1.5 x 2.4 cm right adrenal adenoma (2-33) and   C5 2018, requiring no further evaluation or follow-up. Mildly thickened   left adrenal gland, also chronic.  KIDNEYS/URETERS: There is a 1.4 cm exophytic posterior right upper pole   renal lesion measuring 69 Hounsfield units (2-44), previously a simple   cyst on 8/5/2018 and therefore favored representinterval development of   a hemorrhagic or proteinaceous debris. There is also a 1.5 cm hyperdense   right upper pole cyst (2-38) measuring 86 Hounsfield units in density.   There are a few additional probable bilateral renal cysts. There are   vascular calcifications within the bilateral renal sinus, limiting   evaluation for calculi. No hydronephrosis. There appears to be some   retained contrast within the renal cortex.    BLADDER: Excreted contrast is present within the urinary bladder.  REPRODUCTIVE ORGANS: Status post hysterectomy.    BOWEL: No bowel obstruction. Appendix is not definitely identified. A   left lower quadrant colonic anastomosis is present without evidence of   obstruction.  PERITONEUM/RETROPERITONEUM: Within normal limits.  VESSELS: Extensive calcified atherosclerosis of the abdominal aorta,   which is also tortuous. There are 2 femoral-femoral bypass grafts. No   aortic aneurysm.  LYMPH NODES: No lymphadenopathy.  ABDOMINAL WALL: Within normal limits.  BONES: The bones are markedly osteopenic and there are advanced   degenerative changes of the spine with partial, stricture of the mid   lumbar spine. Old healed left-sided rib fracture deformities are present.    IMPRESSION:  1. No evidence of intra-abdominal hematoma.  2. Probable subtle retained renal cortical contrast, suggestive of acute   tubular necrosis (ATN).  3. Slight increase in size of the moderate right and small left pleural   effusions with adjacent atelectasis.  4. There is new trace high attenuation within the upper aspect of the   right pleural effusion, which is nonspecific.    --- End of Report ---            JES COOK MD; Attending Radiologist  This document has been electronically signed. May 11 2025  9:00PM    < end of copied text >    ECHO:< from: TTE W or WO Ultrasound Enhancing Agent (05.10.25 @ 10:41) >     CONCLUSIONS:      1. Left ventricular cavity is small. Left ventricular systolic function is normal with an ejection fraction visually estimated at 50 to 55 %. There are no regional wall motion abnormalities seen. D shaped septum indicative of RV pressure/volume overload.   2. There is increased LV mass and concentric hypertrophy.   3. Mild left ventricular hypertrophy.   4. There is moderate (grade 2) left ventricular diastolic dysfunction.   5. Based on visual assessment, the right ventricle appears mildly enlarged. normal wall thickness, and reduced right ventricular systolic function. Tricuspid annular plane systolic excursion (TAPSE) is 1.5 cm (normal >=1.7 cm).   6. Left atrium is severely dilated.   7. The right atrium is severely dilated.   8. Moderate mitral regurgitation.   9. Normal left and right atrial size.  10. Moderate tricuspid regurgitation.  11. Moderate pulmonic regurgitation.  12. Estimated pulmonary artery systolic pressure is 69 mmHg, consistent with elevated pulmonary artery pressure.  13. The inferior vena cava is normal in size measuring 1.80 cm in diameter, (normal <2.1cm) with abnormal inspiratory collapse (abnormal <50%) consistent with mildly elevated right atrial pressure (~8, range 5-10mmHg).  14. No pericardial effusion seen.  15. No prior echocardiogram is available for comparison.      < from: CT Angio Chest PE Protocol w/ IV Cont (05.09.25 @ 19:52) >    ACC: [id number] EXAM:  CT ANGIO CHEST PULM [facility]   ORDERED BY: DANIELLE BEACH     PROCEDURE DATE:  05/09/2025          INTERPRETATION:  CLINICAL INFORMATION: Rule out pulmonary embolism.    COMPARISON: CT abdomen and pelvis dated 8/5/2018 and CT angiogram of the   chest dated 5/15/2019.    CONTRAST/COMPLICATIONS:  IV Contrast: Omnipaque 350  66 cc administered   37 cc discarded  Oral Contrast: NONE  Complications: None documented at time of interpretation.    PROCEDURE:  CT Angiography of the Chest.  Sagittal and coronal reformats were performed as well as 3D (MIP)   reconstructions.    FINDINGS:    LUNGS AND LARGE AIRWAYS: Patent central airways. ] Of the left bibasilar   atelectasis, particularly against the effusions. Mild upper lobe  predominant centrilobular emphysema. There is a small benign-appearing   right minor fissure-based lymph node (14-92).  PLEURA: Small moderate right pleural effusion and very small left pleural   effusion, new from prior study. No pneumothorax.  VESSELS: The pulmonary arteries are well opacified and evaluated without   evidence of filling defect. There is extensive calcified atherosclerosis   of the thoracic aorta. No aortic aneurysm.  HEART: Cardiomegaly. Extensive coronary artery calcification is are   present. No pericardial effusion.  MEDIASTINUM AND MACY: No lymphadenopathy.  CHEST WALL AND LOWER NECK: There is a 1.5 cm left low-attenuation thyroid   nodule versus cyst (5-24). A right-sided single lead pacemaker is present   with tip in the right ventricular apex.  VISUALIZED UPPER ABDOMEN: Unchanged 1.2 x 2.1 cm right adrenal nodule,   likely a benign adenoma. There is also chronic mild thickening of the   left adrenal gland. There are several partially visualized right probable   renal cysts as well as a 1.2 cm high attenuation structure at the upper   pole (5-154), previously measuring 1.0 cm on 5/15/2018 and possibly   represent an a hyperdense cyst or indolent/very slow growing solid lesion   of doubtful significance in this patient.  BONES: The bones are diffusely osteopenic and there are multilevel   degenerative changes of the spine. Probable old healed posterior medial   left 11th rib fracture deformity () and a few additional old healed   left rib fracture deformities.    IMPRESSION:  1. No evidence of pulmonary embolism.  2. New small to moderate right pleural effusion and small left pleural effusion with adjacent airspace opacity, favored to represent   atelectasis. Infection is considered less likely, though is possible in the appropriate clinical setting.  3. Additional chronic and incidental findings are present as detailed   above.    --- End of Report ---  JES COOK MD; Attending Radiologist  This document has been electronically signed. May  9 2025  8:20PM    < end of copied text >    Consultant(s) Notes Reviewed:   YES      Plan of care was discussed with patient and /or primary care giver; all questions and concerns were addressed

## 2025-05-15 NOTE — PHYSICAL THERAPY INITIAL EVALUATION ADULT - GENERAL OBSERVATIONS, REHAB EVAL
Patient received supine in bed, alert and able to make needs known, NAD, )2 via NC at 2L/min, Jarrett catheter in situ, saline lock intact; cooperative to PT evaluation Patient received supine in bed, alert and able to make needs known, NAD, O2 via NC at 2L/min, Jarrett catheter in situ, saline lock intact; cooperative to PT evaluation

## 2025-05-15 NOTE — PROGRESS NOTE ADULT - PROBLEM SELECTOR PLAN 1
p/w sob x 1 week   CXR -New small to moderate right pleural effusion and small left pleural effusion with adjacent airspace opacity  BNP 2.3 K   Appears mildly hypervolemic on lung exam, also appears dehydrated ( dry mucus membranes )   takes metoprolol, lasix, losartan as per superscripts   s/p lasix 40 in ER   -c/w toprol with holding parameters   -s/p tele  -Troponin neg x 1   -daily weights, strict I&O  -Cardiology consulted: Dr Lim

## 2025-05-15 NOTE — PHYSICAL THERAPY INITIAL EVALUATION ADULT - PERTINENT HX OF CURRENT PROBLEM, REHAB EVAL
AHRF 2/2 CHF vs PNA. Additional past medical history as detailed below by medical team.
AHRF 2/2 CHF vs PNA. Additional past medical history as detailed below by medical team.

## 2025-05-15 NOTE — DIETITIAN INITIAL EVALUATION ADULT - OTHER INFO
Pt visited. On O2. Pt is [place]. Per Pt HT 64 inches and USUAL BODY WEIGHT 125  LB. WEIGHT PER  LB 5/13/25, WEIGHT 127 LB 3/27/25. NFPE Performed. Pt with Edema on R leg . Per Pt small wt loss , unable to Quantify. . Pt has Upper and lower Dentures. Pt reports she live home alone.  Per MD noted Plan is LTC w Hospice. Labs Noted. Food choices obtained. Pt likes G cristobal. Agreed to try supplement likes Vanilla Flavour.

## 2025-05-15 NOTE — PROGRESS NOTE ADULT - PROBLEM SELECTOR PLAN 2
WBC WNL, neutrophilic shift  T afebrile, reports subjective fever and chills x 1  week   CXR as above   RVP neg    s/p CTX in ED  ESR WNL; procal 0.3, CRP 6.2  Mycoplasma neg  - off abx

## 2025-05-15 NOTE — PHYSICAL THERAPY INITIAL EVALUATION ADULT - DIAGNOSIS, PT EVAL
(ICF Model) Pt. present w/deficits in Body Structures/Function (Impairments), incl: Strength, Balance, leading to deficits in performing the below noted Activities (Limitations).
(ICF Model) Pt. present w/deficits in Body Structures/Function (Impairments), incl: Strength, Balance, leading to deficits in performing the below noted Activities (Limitations).

## 2025-05-15 NOTE — PROGRESS NOTE ADULT - ASSESSMENT
89/ F from home, [place], ambulates with a cane/ walker at baseline, AOx 3 with PMHx  HTN, HLD, chronic LLE edema , afib on eliquis , recent PPM placement 3/25 presented to ER with c/o SOB on exertion, subjective fever, chills , non productive cough, chest congestion x 1 week. Labs s/o Hb 8 ( from 11.4), neutrophilic shift , BUN/ Cr 88/ 1.66 from ( 28/1.26) , lact 3.3, p BNP 2.3 K . Imaging c/w BL pleural effusions ? R sided infiltrate . S/p lasix 40 , CTX 2 g .   Admitted to medicine for AHRF 2/2 CHF and Anemia requiring transfusion. Palliative consulted. Previously planned DC to LTC with hospice, now pt and HCP wishes [facility]. PT re-consulted.

## 2025-05-15 NOTE — PROGRESS NOTE ADULT - NS ATTEND AMEND GEN_ALL_CORE FT
89 year old woman from home with extensive medical hx including HTN, HLD , A-fib on OAC, hx of recent PPM procedure, chronic LLE edema here with progressive SOB, dry cough with congestion in the last week. Clinical exam with bibasilar lung crackles and  Imaging studies show B/L pleural effusion but no pulmonary vascular congestion.  Concern for acute right sided heart failure likely worsened by mitral valve regurgitation with cardiorenal failure.  There is also concern for high output cardiac failure from anemia with recent drop from baseline hgb   Patient GOC completed on 5/10, DNR/DNI  Patient's Hgb has improved and stabilized following PRBC transfusions.   After pall cosult- patient for LTC with comfort/palliative  As of 5/14, patient and her family now wants subacute rehab    #AHRF 2/2  #Dyspnea, c/f CHF Exacerbation (HFpEF and severe pulmonary hypertension) (New Pleural Effusions)   #Anemia requiring transfusion, stable  #S/p PPM  #CAD  #BATSHEVA on CKD, improving  #AFIB, holding AC  #Acute urinary retention    -Pall care consult, -LTC with palliative/hospice, as of 5/14, decision was reversed and family wants rehab, however they are still in agreement with comfort and conservative mgmt such as holding anticoagulation given recent anemia requiring transfusion.    -stop antibiotics was given ceftriaxone/azithromycin initially due to concerns for community acquired pneumonia.  -judicious lab work- comfort measures, continuing to monitor for hgb and transfusions is not in line with goals of care  -regarding TTE and imaging findings, per Cardiology here, very unlikely a thrombus would be seen on pacer wire from surface TTE alone, can ask patient if she desires JULIA but do not suspect this is within her desires as she has been clear to me she does not want invasive procedures  -pacemaker interrogated, DC tele monitoring  -ECHO reviewed  -Hold OAC given anemia, not in lines with GOC  -Failed trial of void, fraire placed evening of 5/14/2025  -Dispo: LTC with hospice.

## 2025-05-15 NOTE — PROGRESS NOTE ADULT - PROBLEM SELECTOR PLAN 3
-Echo with EF 50 to 55 %, moderate (grade 2) left ventricular diastolic dysfunction. Moderate MR, TR and NJ. Elevated pulmonary artery pressure.  -s/p IV lasix  -c/w toprol with holding parameters   -See above

## 2025-05-15 NOTE — PHYSICAL THERAPY INITIAL EVALUATION ADULT - LEVEL OF INDEPENDENCE: STAIR NEGOTIATION, REHAB EVAL
N/A
Stairs not assessed at this time as pt. does not require stairs to access/negotiate home environment

## 2025-05-15 NOTE — PHYSICAL THERAPY INITIAL EVALUATION ADULT - ADDITIONAL COMMENTS
owns rolling walker, rollator, shower bars and straight cane
neighbors assist at times, ex: shopping  owns rolling walker, rollator, shower bars and straight cane

## 2025-05-15 NOTE — PHYSICAL THERAPY INITIAL EVALUATION ADULT - NSPTDISCHREC_GEN_A_CORE
Sub-acute Rehab
Per discussion with palliative NP and SW, pt. will be d/c to NH w/hospice care. Recoomend f/u by PT serives at d/c destination to optimize functional mobility and activity independence which pt reports as extremely important to he goals of care.

## 2025-05-15 NOTE — PROGRESS NOTE ADULT - PROBLEM SELECTOR PLAN 4
Known hx of afib  Not in RVR  Pacemaker placed 3/2025   s/p PPM Interrogation-normal device functioning   recent TTE OP- echocardiogram outpatient that showed high pulmonary pressures and mitral valve regurgitation with left atrial enlargement.  Also noted possible clot around the pacemaker wire.   -cont home  toprol   -hold eliquis due to Hb drop

## 2025-05-15 NOTE — PHYSICAL THERAPY INITIAL EVALUATION ADULT - GAIT DISTANCE, PT EVAL
40 feet
40ft. Spo2 at lowest was 92% on RA improving to 97% with seated rest within ~30 sec . Pt denied any exertion. HR 60s

## 2025-05-15 NOTE — PHYSICAL THERAPY INITIAL EVALUATION ADULT - GAIT DEVIATIONS NOTED, PT EVAL
decreased eli/increased time in double stance/decreased stride length/increased stride width
decreased eli/decreased velocity of limb motion/decreased step length/decreased stride length/decreased weight-shifting ability

## 2025-05-15 NOTE — PROGRESS NOTE ADULT - PROBLEM SELECTOR PLAN 8
VSS   no active external bleed   remote Hx of UGIB in the past   - Maintain active T&S, 2 large bore peripheral IVs, transfuse for goal hgb >7 or if symptomatic  hold eliquis  - s/p 1 u pRBC on 5/11  post-transfusion hgb stable.

## 2025-05-15 NOTE — PROGRESS NOTE ADULT - TIME BILLING
-bedside history and exam  -working with patient and HCP to complete MOLST  -counseling patient about what may happen to her if she declines PRBC transfusion, up to and including death  -lab review  -documenting the encounter      Time spent excludes time spent teach residents
discussionb with consultants, discussion with patient, review of hospital course, de-escalation of care after palliative discussion
-bedside history and exam  -lab review  -obtaining collateral hx from Mckayla Nair at bedside  -reviewing outside echo report and calling Dr. Wilburn to discuss  -discussing case with Brendan Campos of [facility]  -discussing case with Dr. Gaona of Psych  -documenting encounter  -DC planning      Time spent excludes time spent teaching residents
Discussion with healthcare proxies, coordination with case management and , discussion and counseling with patient, formulation of plan, medical and medication management, documentation of encounter

## 2025-05-15 NOTE — DIETITIAN INITIAL EVALUATION ADULT - PROBLEM SELECTOR PLAN 1
p/w sob x 1 week   CXR -New small to moderate right pleural effusion and small left pleural effusion with adjacent airspace opacity  BNP 2.3 K   Appears mildly hypervolemic on lung exam, also appears dehydrated ( dry mucus membranes )     takes metoprolol, lasix, losartan as per superscripts   s/p lasix 40 in ER   -c/w toprol with holding parameters   -Start IV lasix  -Remote tele  -Troponin neg x 1   -daily weights, strict I&O  [ ] obtain OP TTE results   -Cardio Consult in AM

## 2025-05-15 NOTE — DIETITIAN INITIAL EVALUATION ADULT - PERTINENT LABORATORY DATA
05-15    142  |  113[H]  |  41[H]  ----------------------------<  103[H]  4.1   |  23  |  1.25    Ca    8.5      15 May 2025 05:25  Phos  2.9     05-14  Mg     2.2     05-14    A1C with Estimated Average Glucose Result: 5.7 % (05-10-25 @ 07:37)

## 2025-05-16 ENCOUNTER — APPOINTMENT (OUTPATIENT)
Dept: ELECTROPHYSIOLOGY | Facility: CLINIC | Age: 89
End: 2025-05-16

## 2025-05-16 PROCEDURE — 99232 SBSQ HOSP IP/OBS MODERATE 35: CPT

## 2025-05-16 RX ADMIN — POLYETHYLENE GLYCOL 3350 17 GRAM(S): 17 POWDER, FOR SOLUTION ORAL at 12:41

## 2025-05-16 RX ADMIN — ATORVASTATIN CALCIUM 40 MILLIGRAM(S): 80 TABLET, FILM COATED ORAL at 21:56

## 2025-05-16 RX ADMIN — METOPROLOL SUCCINATE 25 MILLIGRAM(S): 50 TABLET, EXTENDED RELEASE ORAL at 05:01

## 2025-05-16 NOTE — PROGRESS NOTE ADULT - PROBLEM SELECTOR PLAN 3
-Echo with EF 50 to 55 %, moderate (grade 2) left ventricular diastolic dysfunction. Moderate MR, TR and DC. Elevated pulmonary artery pressure.  -s/p IV lasix  -c/w toprol with holding parameters   -See above

## 2025-05-16 NOTE — PROGRESS NOTE ADULT - PROBLEM SELECTOR PLAN 1
p/w sob x 1 week   CXR -New small to moderate right pleural effusion and small left pleural effusion with adjacent airspace opacity  BNP 2.3 K   Appears mildly hypervolemic on lung exam, also appears dehydrated ( dry mucus membranes )   takes metoprolol, lasix, losartan as per superscripts   s/p lasix 40 in ER   -c/w toprol with holding parameters   -s/p tele  -Troponin neg x 1   -daily weights, strict I&O  -Cardiology consulted: Dr Lim  -pt need oxygen due to pulmonary edema. continue for now can titrate oxygen at [facility].

## 2025-05-16 NOTE — PROGRESS NOTE ADULT - ASSESSMENT
89/ F from home, [place], ambulates with a cane/ walker at baseline, AOx 3 with PMHx  HTN, HLD, chronic LLE edema , afib on eliquis , recent PPM placement 3/25 presented to ER with c/o SOB on exertion, subjective fever, chills , non productive cough, chest congestion x 1 week. Labs s/o Hb 8 ( from 11.4), neutrophilic shift , BUN/ Cr 88/ 1.66 from ( 28/1.26) , lact 3.3, p BNP 2.3 K . Imaging c/w BL pleural effusions ? R sided infiltrate . S/p lasix 40 , CTX 2 g .   Admitted to medicine for AHRF 2/2 CHF and Anemia requiring transfusion. Palliative consulted. Previously planned DC to LTC with hospice, now pt and HCP wish Rehabilitation. PT re-consulted, reccs [facility]. CM following for placement.

## 2025-05-16 NOTE — PROGRESS NOTE ADULT - NS ATTEND OPT1A GEN_ALL_CORE
Medical decision making
Medical decision making
Exam/Medical decision making
Exam/Medical decision making

## 2025-05-16 NOTE — PROGRESS NOTE ADULT - NS ATTEND AMEND GEN_ALL_CORE FT
89 year old woman from home with extensive medical hx including HTN, HLD , A-fib on OAC, hx of recent PPM procedure, chronic LLE edema here with progressive SOB, dry cough with congestion in the last week. Clinical exam with bibasilar lung crackles and  Imaging studies show B/L pleural effusion but no pulmonary vascular congestion.  Concern for acute right sided heart failure likely worsened by mitral valve regurgitation with cardiorenal failure.  There is also concern for high output cardiac failure from anemia with recent drop from baseline hgb   Patient GOC completed on 5/10, DNR/DNI  Patient's Hgb has improved and stabilized following PRBC transfusions.   After pall cosult- patient for LTC with comfort/palliative  As of 5/14, patient and her family now wants subacute rehab    No new complaints, unable to wean off o2, otherwise comfortable.    #AHRF 2/2  #Dyspnea, c/f CHF Exacerbation (HFpEF and severe pulmonary hypertension) (New Pleural Effusions)   #Anemia requiring transfusion, stable  #S/p PPM  #CAD  #BATSHEVA on CKD, improving  #AFIB, holding AC  #Acute urinary retention    -Pall care consult, -LTC with palliative/hospice, as of 5/14, decision was reversed and family wants rehab, however they are still in agreement with comfort and conservative mgmt such as holding anticoagulation given recent anemia requiring transfusion.    -stop antibiotics was given ceftriaxone/azithromycin initially due to concerns for community acquired pneumonia.  -judicious lab work- comfort measures, occasional lab work  -regarding TTE and imaging findings, per Cardiology here, very unlikely a thrombus would be seen on pacer wire from surface TTE alone, can ask patient if she desires JULIA but do not suspect this is within her desires as she has been clear to me she does not want invasive procedures  -pacemaker interrogated, DC tele monitoring  -ECHO reviewed  -Hold OAC given anemia requiring transfusion on admission, not in lines with GOC  -Failed trial of void, fraire placed evening of 5/14/2025  -Dispo: LTC with hospice.

## 2025-05-16 NOTE — PROGRESS NOTE ADULT - SUBJECTIVE AND OBJECTIVE BOX
NP Note discussed with  Primary Attending    INTERVAL HPI/OVERNIGHT EVENTS: no new complaints, no new overnight event, pt c/o short of breath without oxygen, unable to wean off oxygen this time.     MEDICATIONS  (STANDING):  atorvastatin 40 milliGRAM(s) Oral at bedtime  metoprolol succinate ER 25 milliGRAM(s) Oral daily  polyethylene glycol 3350 17 Gram(s) Oral daily    MEDICATIONS  (PRN):  acetaminophen     Tablet .. 650 milliGRAM(s) Oral every 6 hours PRN Temp greater or equal to 38C (100.4F), Mild Pain (1 - 3)  benzocaine/menthol Lozenge 1 Lozenge Oral every 6 hours PRN cough  guaiFENesin Oral Liquid (Sugar-Free) 200 milliGRAM(s) Oral every 6 hours PRN Cough      __________________________________________________  REVIEW OF SYSTEMS:    CONSTITUTIONAL: No fever,   EYES: no acute visual disturbances  NECK: No pain or stiffness  RESPIRATORY: No cough; +shortness of breath on exertion, eating   CARDIOVASCULAR: No chest pain, no palpitations  GASTROINTESTINAL: No pain. No nausea or vomiting; No diarrhea   NEUROLOGICAL: No headache or numbness, no tremors  MUSCULOSKELETAL: No joint pain, no muscle pain  GENITOURINARY: no dysuria, no frequency, no hesitancy  PSYCHIATRY: no depression , no anxiety  ALL OTHER  ROS negative        Vital Signs Last 24 Hrs  T(C): 36.7 (16 May 2025 12:55), Max: 36.7 (16 May 2025 12:55)  T(F): 98.1 (16 May 2025 12:55), Max: 98.1 (16 May 2025 12:55)  HR: 64 (16 May 2025 12:55) (60 - 64)  BP: 147/61 (16 May 2025 12:55) (126/67 - 147/61)  BP(mean): --  RR: 17 (16 May 2025 12:55) (15 - 18)  SpO2: 94% (16 May 2025 12:55) (93% - 98%)    Parameters below as of 16 May 2025 12:55  Patient On (Oxygen Delivery Method): nasal cannula  O2 Flow (L/min): 2      ________________________________________________  PHYSICAL EXAM:  GENERAL: NAD, [place]  HEENT: Normocephalic;  conjunctivae and sclerae clear; moist mucous membranes;   NECK : supple  CHEST/LUNG: Clear to auscultation bilaterally with fair air entry   HEART: S1 S2  regular; no murmurs, gallops or rubs  ABDOMEN: Soft, Nontender, Nondistended; Bowel sounds present  EXTREMITIES: no cyanosis; no edema; no calf tenderness  SKIN: warm and dry; no rash  NERVOUS SYSTEM:  Awake and alert; Oriented  to place, person and time ; no new deficits    _________________________________________________  LABS:                        7.7    7.57  )-----------( 191      ( 15 May 2025 05:25 )             24.0     05-15    142  |  113[H]  |  41[H]  ----------------------------<  103[H]  4.1   |  23  |  1.25    Ca    8.5      15 May 2025 05:25    Urinalysis Basic - ( 15 May 2025 05:25 )    Color: x / Appearance: x / SG: x / pH: x  Gluc: 103 mg/dL / Ketone: x  / Bili: x / Urobili: x   Blood: x / Protein: x / Nitrite: x   Leuk Esterase: x / RBC: x / WBC x   Sq Epi: x / Non Sq Epi: x / Bacteria: x    CAPILLARY BLOOD GLUCOSE    RADIOLOGY & ADDITIONAL TESTS:    Imaging  Reviewed:  YES  < from: CT Abdomen and Pelvis No Cont (05.11.25 @ 18:36) >    ACC: [id number] EXAM:  CT ABDOMEN AND PELVIS   ORDERED BY: PHANI HOFF     PROCEDURE DATE:  05/11/2025          INTERPRETATION:  CLINICAL INFORMATION: Evaluate for hematoma.    COMPARISON: CT abdomen and pelvis dated 8/5/2019 and chestCT dated   5/9/2025.    CONTRAST/COMPLICATIONS:  IV Contrast: NONE  Oral Contrast: NONE    PROCEDURE:  CT of the Abdomen and Pelvis was performed.  Sagittal and coronal reformats were performed.    FINDINGS:  Evaluation of the solid organs and vasculature is somewhat limited due to   lack of intravenous contrast.    LOWER CHEST: Small moderate right and small left pleural effusions,   slightly increase in size from 5/9/2025. There is subtle dependent high   attenuation within the right pleural effusion (2-1) incompletely included   in the field-of-view and therefore limited in evaluation. Cardiomegaly   with coronary artery calcifications and mitral valve annular   calcifications.    LIVER: Within normal limits.  BILE DUCTS: Normal caliber.  GALLBLADDER: Mildly thick-walled, though underdistended.  SPLEEN: Within normal limits.  PANCREAS: Within normal limits.  ADRENALS: Similar size of a 1.5 x 2.4 cm right adrenal adenoma (2-33) and   C5 2018, requiring no further evaluation or follow-up. Mildly thickened   left adrenal gland, also chronic.  KIDNEYS/URETERS: There is a 1.4 cm exophytic posterior right upper pole   renal lesion measuring 69 Hounsfield units (2-44), previously a simple   cyst on 8/5/2018 and therefore favored representinterval development of   a hemorrhagic or proteinaceous debris. There is also a 1.5 cm hyperdense   right upper pole cyst (2-38) measuring 86 Hounsfield units in density.   There are a few additional probable bilateral renal cysts. There are   vascular calcifications within the bilateral renal sinus, limiting   evaluation for calculi. No hydronephrosis. There appears to be some   retained contrast within the renal cortex.    BLADDER: Excreted contrast is present within the urinary bladder.  REPRODUCTIVE ORGANS: Status post hysterectomy.    BOWEL: No bowel obstruction. Appendix is not definitely identified. A   left lower quadrant colonic anastomosis is present without evidence of   obstruction.  PERITONEUM/RETROPERITONEUM: Within normal limits.  VESSELS: Extensive calcified atherosclerosis of the abdominal aorta,   which is also tortuous. There are 2 femoral-femoral bypass grafts. No   aortic aneurysm.  LYMPH NODES: No lymphadenopathy.  ABDOMINAL WALL: Within normal limits.  BONES: The bones are markedly osteopenic and there are advanced   degenerative changes of the spine with partial, stricture of the mid   lumbar spine. Old healed left-sided rib fracture deformities are present.    IMPRESSION:  1. No evidence of intra-abdominal hematoma.  2. Probable subtle retained renal cortical contrast, suggestive of acute   tubular necrosis (ATN).  3. Slight increase in size of the moderate right and small left pleural   effusions with adjacent atelectasis.  4. There is new trace high attenuation within the upper aspect of the   right pleural effusion, which is nonspecific.    --- End of Report ---    JES COOK MD; Attending Radiologist  This document has been electronically signed. May 11 2025  9:00PM    < end of copied text >    < from: Xray Chest 1 View- PORTABLE-Urgent (05.09.25 @ 18:28) >    ACC: [id number] EXAM:  XR CHEST PORTABLE URGENT 1V   ORDERED BY: DANIELLE BEACH   PROCEDURE DATE:  05/09/2025    INTERPRETATION:  TECHNIQUE: A single AP view of the chest was obtained.   Ordered time:   5/9/2025 6:28 PM  COMPARISON: 8/6/2019  CLINICAL INFORMATION: Shortness of breath  FINDINGS:  AICD device in place.  The heart is enlarged, new since prior.  Bronchial wall thickening and peribronchial haziness in the right lower   lobe.  Small right pleural effusion.  There is no pneumothorax.  Old rib fractures, left eighth and ninth.    IMPRESSION:  Cardiomegaly.  Peribronchial haziness in the right lower lobe with small right pleural   effusion, concerning for pneumonia.    --- End of Report ---     SANJEEV ADORNO MD; Attending Radiologist  This document has been electronically signed. May  9 2025  6:32PM    < end of copied text >  Consultant(s) Notes Reviewed:   YES      Plan of care was discussed with patient and /or primary care giver; all questions and concerns were addressed

## 2025-05-16 NOTE — PROGRESS NOTE ADULT - PROBLEM SELECTOR PLAN 9
-bladder scan with straight cath > 500 several times  -Placed Jarrett 5/14. monitor I/Os  -will DC with Jarrett

## 2025-05-17 LAB
ANION GAP SERPL CALC-SCNC: 7 MMOL/L — SIGNIFICANT CHANGE UP (ref 5–17)
BUN SERPL-MCNC: 35 MG/DL — HIGH (ref 7–18)
CALCIUM SERPL-MCNC: 8.6 MG/DL — SIGNIFICANT CHANGE UP (ref 8.4–10.5)
CHLORIDE SERPL-SCNC: 112 MMOL/L — HIGH (ref 96–108)
CO2 SERPL-SCNC: 23 MMOL/L — SIGNIFICANT CHANGE UP (ref 22–31)
CREAT SERPL-MCNC: 1.3 MG/DL — SIGNIFICANT CHANGE UP (ref 0.5–1.3)
EGFR: 38 ML/MIN/1.73M2 — LOW
EGFR: 38 ML/MIN/1.73M2 — LOW
GLUCOSE SERPL-MCNC: 100 MG/DL — HIGH (ref 70–99)
HCT VFR BLD CALC: 26.1 % — LOW (ref 34.5–45)
HGB BLD-MCNC: 8.3 G/DL — LOW (ref 11.5–15.5)
MCHC RBC-ENTMCNC: 27.8 PG — SIGNIFICANT CHANGE UP (ref 27–34)
MCHC RBC-ENTMCNC: 31.8 G/DL — LOW (ref 32–36)
MCV RBC AUTO: 87.3 FL — SIGNIFICANT CHANGE UP (ref 80–100)
NRBC BLD AUTO-RTO: 0 /100 WBCS — SIGNIFICANT CHANGE UP (ref 0–0)
PLATELET # BLD AUTO: 250 K/UL — SIGNIFICANT CHANGE UP (ref 150–400)
POTASSIUM SERPL-MCNC: 4.3 MMOL/L — SIGNIFICANT CHANGE UP (ref 3.5–5.3)
POTASSIUM SERPL-SCNC: 4.3 MMOL/L — SIGNIFICANT CHANGE UP (ref 3.5–5.3)
RBC # BLD: 2.99 M/UL — LOW (ref 3.8–5.2)
RBC # FLD: 17.5 % — HIGH (ref 10.3–14.5)
SODIUM SERPL-SCNC: 142 MMOL/L — SIGNIFICANT CHANGE UP (ref 135–145)
WBC # BLD: 7.9 K/UL — SIGNIFICANT CHANGE UP (ref 3.8–10.5)
WBC # FLD AUTO: 7.9 K/UL — SIGNIFICANT CHANGE UP (ref 3.8–10.5)

## 2025-05-17 PROCEDURE — 99232 SBSQ HOSP IP/OBS MODERATE 35: CPT

## 2025-05-17 RX ADMIN — Medication 1 APPLICATION(S): at 12:27

## 2025-05-17 RX ADMIN — POLYETHYLENE GLYCOL 3350 17 GRAM(S): 17 POWDER, FOR SOLUTION ORAL at 12:26

## 2025-05-17 RX ADMIN — METOPROLOL SUCCINATE 25 MILLIGRAM(S): 50 TABLET, EXTENDED RELEASE ORAL at 05:16

## 2025-05-17 RX ADMIN — ATORVASTATIN CALCIUM 40 MILLIGRAM(S): 80 TABLET, FILM COATED ORAL at 21:47

## 2025-05-17 NOTE — PROGRESS NOTE ADULT - SUBJECTIVE AND OBJECTIVE BOX
INTERVAL HPI/OVERNIGHT EVENTS:   Seen this AM, sitting out of bed to chair, she has no complaints. No difficulty breathing no pain.  SHe is wondering when she will be transitioned out of the hospital. Currently awaiting auth for [facility]    REVIEW OF SYSTEMS:  negative except per hpi    Vital Signs Last 24 Hrs  T(C): 36.3 (17 May 2025 11:48), Max: 36.7 (16 May 2025 12:55)  T(F): 97.3 (17 May 2025 11:48), Max: 98.1 (16 May 2025 12:55)  HR: 591 (17 May 2025 11:48) (60 - 591)  BP: 154/78 (17 May 2025 11:48) (112/69 - 154/78)  BP(mean): --  RR: 18 (17 May 2025 11:48) (16 - 18)  SpO2: 99% (17 May 2025 11:48) (94% - 99%)    Parameters below as of 17 May 2025 11:48  Patient On (Oxygen Delivery Method): nasal cannula  O2 Flow (L/min): 2      PHYSICAL EXAMINATION:  Elderly female, pale, on nasal cannula, abd soft nontender, no lower ext edema, alert and oriented to situation                          8.3    7.90  )-----------( 250      ( 17 May 2025 08:05 )             26.1     05-17    142  |  112[H]  |  35[H]  ----------------------------<  100[H]  4.3   |  23  |  1.30    Ca    8.6      17 May 2025 08:05                CAPILLARY BLOOD GLUCOSE      RADIOLOGY & ADDITIONAL TESTS:

## 2025-05-18 PROCEDURE — 99232 SBSQ HOSP IP/OBS MODERATE 35: CPT

## 2025-05-18 RX ADMIN — Medication 1 APPLICATION(S): at 11:29

## 2025-05-18 RX ADMIN — POLYETHYLENE GLYCOL 3350 17 GRAM(S): 17 POWDER, FOR SOLUTION ORAL at 11:30

## 2025-05-18 RX ADMIN — METOPROLOL SUCCINATE 25 MILLIGRAM(S): 50 TABLET, EXTENDED RELEASE ORAL at 05:42

## 2025-05-18 RX ADMIN — ATORVASTATIN CALCIUM 40 MILLIGRAM(S): 80 TABLET, FILM COATED ORAL at 22:17

## 2025-05-18 NOTE — PROGRESS NOTE ADULT - ASSESSMENT
89 year old woman from home with extensive medical hx including HTN, HLD , A-fib on OAC, hx of recent PPM procedure, chronic LLE edema here with progressive SOB, dry cough with congestion in the last week. Clinical exam with bibasilar lung crackles and  Imaging studies show B/L pleural effusion but no pulmonary vascular congestion.  Concern for acute right sided heart failure likely worsened by mitral valve regurgitation with cardiorenal failure.  There is also concern for high output cardiac failure from anemia with recent drop from baseline hgb   Patient GOC completed on 5/10, DNR/DNI  Patient's Hgb has improved and stabilized following PRBC transfusions.   After pall cosult- patient for LTC with comfort/palliative  As of 5/14, patient and her family now wants subacute rehab    #AHRF 2/2  #Dyspnea, c/f CHF Exacerbation (HFpEF and severe pulmonary hypertension) (New Pleural Effusions)   #Anemia requiring transfusion, stable  #S/p PPM  #CAD  #BATSHEVA on CKD, improving  #AFIB, holding AC  #Acute urinary retention    -Pall care consult, -LTC with palliative/hospice, as of 5/14, decision was reversed and family wants rehab, however they are still in agreement with comfort and conservative mgmt such as holding anticoagulation given recent anemia requiring transfusion.    -stop antibiotics was given ceftriaxone/azithromycin initially due to concerns for community acquired pneumonia.  -judicious lab work- comfort measures, continuing to monitor for hgb and transfusions is not in line with goals of care  -regarding TTE and imaging findings, per Cardiology here, very unlikely a thrombus would be seen on pacer wire from surface TTE alone, JULIA not in line with goals of care  -pacemaker interrogated, DC tele monitoring  -ECHO reviewed  -Hold OAC given anemia, not in lines with GOC. Stable HGb, this can be continued outpatient if patient and HCP changes their mind  -Failed trial of void, fraire placed evening of 5/14/2025  -Dispo: Initially LTC with hospice, patient and proxies changed their mind, going to [facility], awaiting auth

## 2025-05-18 NOTE — PROGRESS NOTE ADULT - SUBJECTIVE AND OBJECTIVE BOX
INTERVAL HPI/OVERNIGHT EVENTS:   Friends visiting at bedside, patient in good spirits  Shortness of breath is persistent on exertion  no sob at rest no chest pain, no abd pain  no fever no chills    REVIEW OF SYSTEMS:  negative except per hpi     Vital Signs Last 24 Hrs  T(C): 36.6 (18 May 2025 11:37), Max: 36.6 (18 May 2025 05:02)  T(F): 97.8 (18 May 2025 11:37), Max: 97.9 (18 May 2025 05:02)  HR: 70 (18 May 2025 11:37) (60 - 70)  BP: 145/70 (18 May 2025 11:37) (145/70 - 165/76)  BP(mean): --  RR: 18 (18 May 2025 11:37) (17 - 18)  SpO2: 94% (18 May 2025 11:37) (94% - 98%)    Parameters below as of 18 May 2025 11:37  Patient On (Oxygen Delivery Method): nasal cannula  O2 Flow (L/min): 2      PHYSICAL EXAMINATION:  elderly female, pale appearing, on nasal cannula, no lower ext edmea, abd soft nontender, awake oriented pleasant                          8.3    7.90  )-----------( 250      ( 17 May 2025 08:05 )             26.1     05-17    142  |  112[H]  |  35[H]  ----------------------------<  100[H]  4.3   |  23  |  1.30    Ca    8.6      17 May 2025 08:05                CAPILLARY BLOOD GLUCOSE      RADIOLOGY & ADDITIONAL TESTS:

## 2025-05-19 PROCEDURE — 99232 SBSQ HOSP IP/OBS MODERATE 35: CPT

## 2025-05-19 RX ORDER — HEPARIN SODIUM 1000 [USP'U]/ML
5000 INJECTION INTRAVENOUS; SUBCUTANEOUS EVERY 12 HOURS
Refills: 0 | Status: DISCONTINUED | OUTPATIENT
Start: 2025-05-19 | End: 2025-05-25

## 2025-05-19 RX ORDER — NIFEDIPINE 30 MG
30 TABLET, EXTENDED RELEASE 24 HR ORAL DAILY
Refills: 0 | Status: DISCONTINUED | OUTPATIENT
Start: 2025-05-19 | End: 2025-05-26

## 2025-05-19 RX ADMIN — ATORVASTATIN CALCIUM 40 MILLIGRAM(S): 80 TABLET, FILM COATED ORAL at 22:12

## 2025-05-19 RX ADMIN — POLYETHYLENE GLYCOL 3350 17 GRAM(S): 17 POWDER, FOR SOLUTION ORAL at 13:08

## 2025-05-19 RX ADMIN — Medication 1 APPLICATION(S): at 13:12

## 2025-05-19 RX ADMIN — METOPROLOL SUCCINATE 25 MILLIGRAM(S): 50 TABLET, EXTENDED RELEASE ORAL at 06:33

## 2025-05-19 RX ADMIN — Medication 30 MILLIGRAM(S): at 13:08

## 2025-05-19 RX ADMIN — HEPARIN SODIUM 5000 UNIT(S): 1000 INJECTION INTRAVENOUS; SUBCUTANEOUS at 17:39

## 2025-05-19 NOTE — PROGRESS NOTE ADULT - ASSESSMENT
89 year old woman from home with extensive medical hx including HTN, HLD , A-fib on OAC, hx of recent PPM procedure, chronic LLE edema here with progressive SOB, dry cough with congestion in the last week. Clinical exam with bibasilar lung crackles and  Imaging studies show B/L pleural effusion but no pulmonary vascular congestion.  Concern for acute right sided heart failure likely worsened by mitral valve regurgitation with cardiorenal failure.  There is also concern for high output cardiac failure from anemia with recent drop from baseline hgb   Patient GOC completed on 5/10, DNR/DNI  Patient's Hgb has improved and stabilized following PRBC transfusions.   After pall cosult- patient for LTC with comfort/palliative  As of 5/14, patient and her family now wants subacute rehab    #AHRF 2/2  #Dyspnea, c/f CHF Exacerbation (HFpEF and severe pulmonary hypertension) (New Pleural Effusions)   #Anemia requiring transfusion, stable  #S/p PPM  #CAD  #BATSHEVA on CKD, improving  #AFIB, holding AC  #Acute urinary retention    -Pall care consult, -initially LTC with palliative/hospice, as of 5/14, decision was reversed and family wants rehab, however they are still in agreement with comfort and conservative mgmt such as holding anticoagulation given recent anemia requiring transfusion.  -Resume dvt chemoprophylaxis given stability of hgb, continue to hold full dose AC.  -stop antibiotics was given ceftriaxone/azithromycin initially due to concerns for community acquired pneumonia.  -judicious lab work- comfort measures per palliative and patient  -regarding TTE and imaging findings, per Cardiology here, very unlikely a thrombus would be seen on pacer wire from surface TTE alone, JULIA not in line with goals of care  -pacemaker interrogated, DC tele monitoring  -ECHO reviewed  - resume home antihypertensive nifedipine, continue metoprolol  -Hold OAC given anemia, not in lines with GOC. Stable HGb, this can be continued outpatient if patient and HCP changes their mind. Resumed heparin subcutaneous for dvt prophylaxis  -Failed trial of void, fraire placed evening of 5/14/2025  -Dispo: Initially LTC with hospice, patient and proxies changed their mind, going to [facility], awaiting auth

## 2025-05-19 NOTE — PROGRESS NOTE ADULT - SUBJECTIVE AND OBJECTIVE BOX
INTERVAL HPI/OVERNIGHT EVENTS:   No new symptoms, no overnight events, still has orthopnea but feels better when out of bed to chair  Awaiting auth for rehab    REVIEW OF SYSTEMS:  negative except per hpi    Vital Signs Last 24 Hrs  T(C): 36.9 (19 May 2025 12:03), Max: 36.9 (19 May 2025 12:03)  T(F): 98.4 (19 May 2025 12:03), Max: 98.4 (19 May 2025 12:03)  HR: 63 (19 May 2025 12:03) (63 - 71)  BP: 146/65 (19 May 2025 12:03) (146/65 - 172/69)  BP(mean): --  RR: 18 (19 May 2025 12:03) (17 - 18)  SpO2: 98% (19 May 2025 12:03) (91% - 98%)    Parameters below as of 19 May 2025 12:03  Patient On (Oxygen Delivery Method): nasal cannula  O2 Flow (L/min): 2      PHYSICAL EXAMINATION:  elderly female no distress, hard of hearing, heart irregular, lower ext no pitting edema          CAPILLARY BLOOD GLUCOSE      RADIOLOGY & ADDITIONAL TESTS:

## 2025-05-20 PROCEDURE — 99233 SBSQ HOSP IP/OBS HIGH 50: CPT

## 2025-05-20 RX ADMIN — POLYETHYLENE GLYCOL 3350 17 GRAM(S): 17 POWDER, FOR SOLUTION ORAL at 12:30

## 2025-05-20 RX ADMIN — Medication 1 APPLICATION(S): at 12:31

## 2025-05-20 RX ADMIN — HEPARIN SODIUM 5000 UNIT(S): 1000 INJECTION INTRAVENOUS; SUBCUTANEOUS at 18:53

## 2025-05-20 RX ADMIN — METOPROLOL SUCCINATE 25 MILLIGRAM(S): 50 TABLET, EXTENDED RELEASE ORAL at 05:43

## 2025-05-20 RX ADMIN — ATORVASTATIN CALCIUM 40 MILLIGRAM(S): 80 TABLET, FILM COATED ORAL at 22:17

## 2025-05-20 RX ADMIN — Medication 30 MILLIGRAM(S): at 05:43

## 2025-05-20 RX ADMIN — HEPARIN SODIUM 5000 UNIT(S): 1000 INJECTION INTRAVENOUS; SUBCUTANEOUS at 05:43

## 2025-05-20 NOTE — PROGRESS NOTE ADULT - SUBJECTIVE AND OBJECTIVE BOX
MEDICAL ATTENDING NOTE  Patient is a 89y old  Female who presents with a chief complaint of AHRF 2/2 CHF vs PNA (19 May 2025 13:24)      HPI:    89/ F from home, [place], ambulates with a cane/ walker at baseline, AOx 3 with PMHx  HTN, HLD, afib on eliquis , recent PPM placement 3/25 presented to ER with c/o SOB on exertion, subjective fever, chills , non productive cough, chest congestion x 1 week , worsening over past 2 days. Denies chest pain, recent sick contacts, recent travel. Denies nausea, vomiting , constipation , diarrhea.     Of note : as per chart review and ER sign out - Had an echocardiogram outpatient that showed high pulmonary pressures and mitral valve regurgitation with left atrial enlargement.  Also noted possible clot around the pacemaker wire.      In  the ER   VS - / 79, RR20, , afebrile, sats 92% on 2  L NC     EKG - afib not in RVR    Labs s/o Hb 8 ( from 11.4), neutrophilic shift , BUN/ Cr 88/ 1.66 from ( 28/1.26) , lact 3.3, p BNP 2.3 K     CTA PE   1. No evidence of pulmonary embolism.  2. New small to moderate right pleural effusion and small left pleural   effusion with adjacent airspace opacity, favored to represent   atelectasis. Infection is considered less likely, though is possible in   the appropriate clinical setting.  3. Additional chronic and incidental findings are present as detailed   above.    CXR  Cardiomegaly.  Peribronchial haziness in the right lower lobe with small right pleural   effusion, concerning for pneumonia.    S/p lasix 40 , CTX 2 g  (09 May 2025 23:45)      INTERVAL HPI/OVERNIGHT EVENTS: no new complaints    MEDICATIONS  (STANDING):  atorvastatin 40 milliGRAM(s) Oral at bedtime  chlorhexidine 2% Cloths 1 Application(s) Topical daily  heparin   Injectable 5000 Unit(s) SubCutaneous every 12 hours  metoprolol succinate ER 25 milliGRAM(s) Oral daily  NIFEdipine XL 30 milliGRAM(s) Oral daily  polyethylene glycol 3350 17 Gram(s) Oral daily    MEDICATIONS  (PRN):  acetaminophen     Tablet .. 650 milliGRAM(s) Oral every 6 hours PRN Temp greater or equal to 38C (100.4F), Mild Pain (1 - 3)  benzocaine/menthol Lozenge 1 Lozenge Oral every 6 hours PRN cough  guaiFENesin Oral Liquid (Sugar-Free) 200 milliGRAM(s) Oral every 6 hours PRN Cough          Vital Signs Last 24 Hrs  T(C): 36.5 (20 May 2025 05:12), Max: 36.9 (19 May 2025 12:03)  T(F): 97.7 (20 May 2025 05:12), Max: 98.4 (19 May 2025 12:03)  HR: 61 (20 May 2025 05:12) (60 - 73)  BP: 134/60 (20 May 2025 05:12) (134/60 - 158/67)  BP(mean): --  RR: 18 (20 May 2025 05:12) (18 - 18)  SpO2: 97% (20 May 2025 05:12) (92% - 98%)    Parameters below as of 20 May 2025 05:12  Patient On (Oxygen Delivery Method): nasal cannula  O2 Flow (L/min): 2      ________________________________________________  PHYSICAL EXAM:  GENERAL: Alert, oriented woman, seated in chair  HEENT: Normocephalic;  conjunctivae and sclerae clear; moist mucous membranes;   NECK : supple  CHEST/LUNG: Clear to auscultation bilaterally with good air entry   HEART: S1 S2  regular; no murmurs, gallops or rubs  ABDOMEN: Soft, Nontender, Nondistended; Bowel sounds present  EXTREMITIES: no cyanosis; no edema; no calf tenderness  SKIN: warm and dry; no rash  NERVOUS SYSTEM:  Awake and alert; Oriented  to place, person and time ; no new deficits    _________________________________________________  LABS:              CAPILLARY BLOOD GLUCOSE

## 2025-05-20 NOTE — PROGRESS NOTE ADULT - ASSESSMENT
89 year old woman from home with hx including HTN, HLD , A-fib on OAC, hx of recent PPM procedure, chronic LLE edema here with progressive SOB, dry cough with congestion in the last week. Clinical exam with bibasilar lung crackles and  Imaging studies show B/L pleural effusion but no pulmonary vascular congestion. Now lungs are clear  Concern for acute right sided heart failure likely worsened by mitral valve regurgitation with cardiorenal failure.  There is also concern for high output cardiac failure from anemia with recent drop from baseline hgb   Patient GOC completed on 5/10, DNR/DNI  Patient's Hgb has improved and stabilized following PRBC transfusions.     She is alert and oriented, seated in chair.  Asks when she can go to rehab.  She is eager to continue with PT.   Vital Signs Last 24 Hrs  T(C): 36.5 (20 May 2025 05:12), Max: 36.9 (19 May 2025 12:03)  T(F): 97.7 (20 May 2025 05:12), Max: 98.4 (19 May 2025 12:03)  HR: 61 (20 May 2025 05:12) (60 - 73)  BP: 134/60 (20 May 2025 05:12) (134/60 - 158/67)  BP(mean): --  RR: 18 (20 May 2025 05:12) (18 - 18)  SpO2: 97% (20 May 2025 05:12) (92% - 98%)    Parameters below as of 20 May 2025 05:12  Patient On (Oxygen Delivery Method): nasal cannula  O2 Flow (L/min): 2    Lungs, clear  Cor, irreg      As of 5/14, patient and her family now wants subacute rehab    #AHRF 2/2  #Dyspnea, c/f CHF Exacerbation (HFpEF and severe pulmonary hypertension) (New Pleural Effusions)   #Anemia requiring transfusion, stable  #S/p PPM  #CAD  #BATSHEVA on CKD, improving  #AFIB, holding AC,  but receiving DVT prophylaxis here  #Acute urinary retention     family wants rehab, however they are still in agreement with comfort and conservative mgmt such as holding anticoagulation given recent anemia requiring transfusion.  -Resume dvt chemoprophylaxis given stability of hgb, continue to hold full dose AC.  -stop antibiotics was given ceftriaxone/azithromycin initially due to concerns for community acquired pneumonia.  -judicious lab work- comfort measures per palliative and patient  -regarding TTE and imaging findings, per Cardiology here, very unlikely a thrombus would be seen on pacer wire from surface TTE alone, JULIA not in line with goals of care  -pacemaker interrogated, DC tele monitoring  -ECHO reviewed  - resume home antihypertensive nifedipine, continue metoprolol  -Hold OAC given anemia, not in lines with GOC. Stable HGb, this can be continued outpatient if patient and HCP changes their mind. Resumed heparin subcutaneous for dvt prophylaxis  -Failed trial of void, fraire placed evening of 5/14/2025   patient and proxies prefer to [facility], awaiting auth    I have contacted [employer] for Peer to Peer discussion regarding rehab authorization.  I expect a return call today before noon.   Reference number [id number].

## 2025-05-21 LAB
ALBUMIN SERPL ELPH-MCNC: 2.7 G/DL — LOW (ref 3.5–5)
ALP SERPL-CCNC: 74 U/L — SIGNIFICANT CHANGE UP (ref 40–120)
ALT FLD-CCNC: 15 U/L DA — SIGNIFICANT CHANGE UP (ref 10–60)
ANION GAP SERPL CALC-SCNC: 5 MMOL/L — SIGNIFICANT CHANGE UP (ref 5–17)
AST SERPL-CCNC: 9 U/L — LOW (ref 10–40)
BILIRUB SERPL-MCNC: 0.7 MG/DL — SIGNIFICANT CHANGE UP (ref 0.2–1.2)
BUN SERPL-MCNC: 35 MG/DL — HIGH (ref 7–18)
CALCIUM SERPL-MCNC: 8.7 MG/DL — SIGNIFICANT CHANGE UP (ref 8.4–10.5)
CHLORIDE SERPL-SCNC: 115 MMOL/L — HIGH (ref 96–108)
CO2 SERPL-SCNC: 21 MMOL/L — LOW (ref 22–31)
CREAT SERPL-MCNC: 1.22 MG/DL — SIGNIFICANT CHANGE UP (ref 0.5–1.3)
EGFR: 41 ML/MIN/1.73M2 — LOW
EGFR: 41 ML/MIN/1.73M2 — LOW
GLUCOSE SERPL-MCNC: 88 MG/DL — SIGNIFICANT CHANGE UP (ref 70–99)
HCT VFR BLD CALC: 25.1 % — LOW (ref 34.5–45)
HGB BLD-MCNC: 7.8 G/DL — LOW (ref 11.5–15.5)
MCHC RBC-ENTMCNC: 26.5 PG — LOW (ref 27–34)
MCHC RBC-ENTMCNC: 31.1 G/DL — LOW (ref 32–36)
MCV RBC AUTO: 85.4 FL — SIGNIFICANT CHANGE UP (ref 80–100)
NRBC BLD AUTO-RTO: 0 /100 WBCS — SIGNIFICANT CHANGE UP (ref 0–0)
PLATELET # BLD AUTO: 280 K/UL — SIGNIFICANT CHANGE UP (ref 150–400)
POTASSIUM SERPL-MCNC: 4.2 MMOL/L — SIGNIFICANT CHANGE UP (ref 3.5–5.3)
POTASSIUM SERPL-SCNC: 4.2 MMOL/L — SIGNIFICANT CHANGE UP (ref 3.5–5.3)
PROT SERPL-MCNC: 5.7 G/DL — LOW (ref 6–8.3)
RBC # BLD: 2.94 M/UL — LOW (ref 3.8–5.2)
RBC # FLD: 17.2 % — HIGH (ref 10.3–14.5)
SODIUM SERPL-SCNC: 141 MMOL/L — SIGNIFICANT CHANGE UP (ref 135–145)
WBC # BLD: 8.5 K/UL — SIGNIFICANT CHANGE UP (ref 3.8–10.5)
WBC # FLD AUTO: 8.5 K/UL — SIGNIFICANT CHANGE UP (ref 3.8–10.5)

## 2025-05-21 PROCEDURE — 99232 SBSQ HOSP IP/OBS MODERATE 35: CPT

## 2025-05-21 RX ADMIN — HEPARIN SODIUM 5000 UNIT(S): 1000 INJECTION INTRAVENOUS; SUBCUTANEOUS at 05:46

## 2025-05-21 RX ADMIN — HEPARIN SODIUM 5000 UNIT(S): 1000 INJECTION INTRAVENOUS; SUBCUTANEOUS at 17:38

## 2025-05-21 RX ADMIN — DEXTROMETHORPHAN HBR, GUAIFENESIN 200 MILLIGRAM(S): 200 LIQUID ORAL at 21:26

## 2025-05-21 RX ADMIN — Medication 1 APPLICATION(S): at 12:32

## 2025-05-21 RX ADMIN — POLYETHYLENE GLYCOL 3350 17 GRAM(S): 17 POWDER, FOR SOLUTION ORAL at 12:31

## 2025-05-21 RX ADMIN — ATORVASTATIN CALCIUM 40 MILLIGRAM(S): 80 TABLET, FILM COATED ORAL at 21:26

## 2025-05-21 RX ADMIN — METOPROLOL SUCCINATE 25 MILLIGRAM(S): 50 TABLET, EXTENDED RELEASE ORAL at 05:46

## 2025-05-21 RX ADMIN — Medication 30 MILLIGRAM(S): at 05:46

## 2025-05-21 NOTE — PROGRESS NOTE ADULT - SUBJECTIVE AND OBJECTIVE BOX
MEDICAL ATTENDING NOTE  Patient is a 89y old  Female who presents with a chief complaint of AHRF 2/2 CHF vs PNA (20 May 2025 11:03)      HPI:  Left voice message for Mckayla Jackson who pt identified as HCP with call back number   89/ F from home, [place], ambulates with a cane/ walker at baseline, AOx 3 with PMHx  HTN, HLD, afib on eliquis , recent PPM placement 3/25 presented to ER with c/o SOB on exertion, subjective fever, chills , non productive cough, chest congestion x 1 week , worsening over past 2 days. Denies chest pain, recent sick contacts, recent travel. Denies nausea, vomiting , constipation , diarrhea.     Of note : as per chart review and ER sign out - Had an echocardiogram outpatient that showed high pulmonary pressures and mitral valve regurgitation with left atrial enlargement.  Also noted possible clot around the pacemaker wire.      In  the ER   VS - / 79, RR20, , afebrile, sats 92% on 2  L NC     EKG - afib not in RVR    Labs s/o Hb 8 ( from 11.4), neutrophilic shift , BUN/ Cr 88/ 1.66 from ( 28/1.26) , lact 3.3, p BNP 2.3 K     CTA PE   1. No evidence of pulmonary embolism.  2. New small to moderate right pleural effusion and small left pleural   effusion with adjacent airspace opacity, favored to represent   atelectasis. Infection is considered less likely, though is possible in   the appropriate clinical setting.  3. Additional chronic and incidental findings are present as detailed   above.    CXR  Cardiomegaly.  Peribronchial haziness in the right lower lobe with small right pleural   effusion, concerning for pneumonia.    S/p lasix 40 , CTX 2 g  (09 May 2025 23:45)      INTERVAL HPI/OVERNIGHT EVENTS: no new complaints    MEDICATIONS  (STANDING):  atorvastatin 40 milliGRAM(s) Oral at bedtime  chlorhexidine 2% Cloths 1 Application(s) Topical daily  heparin   Injectable 5000 Unit(s) SubCutaneous every 12 hours  metoprolol succinate ER 25 milliGRAM(s) Oral daily  NIFEdipine XL 30 milliGRAM(s) Oral daily  polyethylene glycol 3350 17 Gram(s) Oral daily    MEDICATIONS  (PRN):  acetaminophen     Tablet .. 650 milliGRAM(s) Oral every 6 hours PRN Temp greater or equal to 38C (100.4F), Mild Pain (1 - 3)  benzocaine/menthol Lozenge 1 Lozenge Oral every 6 hours PRN cough  guaiFENesin Oral Liquid (Sugar-Free) 200 milliGRAM(s) Oral every 6 hours PRN Cough      __________________________________________________    Vital Signs Last 24 Hrs  T(C): 36.7 (21 May 2025 11:50), Max: 36.7 (21 May 2025 11:50)  T(F): 98.1 (21 May 2025 11:50), Max: 98.1 (21 May 2025 11:50)  HR: 84 (21 May 2025 15:11) (63 - 84)  BP: 163/58 (21 May 2025 15:11) (141/56 - 173/74)  BP(mean): --  RR: 17 (21 May 2025 11:50) (17 - 18)  SpO2: 92% (21 May 2025 15:11) (92% - 95%)    Parameters below as of 21 May 2025 15:11  Patient On (Oxygen Delivery Method): nasal cannula  O2 Flow (L/min): 2      PHYSICAL EXAM:  GENERAL: NAD, seated in chair  HEENT: Normocephalic;  conjunctivae and sclerae clear; moist mucous membranes;   NECK : supple  CHEST/LUNG: Clear to auscultation bilaterally with good air entry   HEART: S1 S2  regular; no murmurs, gallops or rubs  ABDOMEN: Soft, Nontender, Nondistended; Bowel sounds present  Jarrett catheter is draining clear urine  EXTREMITIES: no cyanosis; no edema; no calf tenderness  SKIN: warm and dry; no rash  NERVOUS SYSTEM:  Awake and alert; Oriented  to place, person and time ; no new deficits    _________________________________________________  LABS:                        7.8    8.50  )-----------( 280      ( 21 May 2025 05:55 )             25.1     05-21    141  |  115[H]  |  35[H]  ----------------------------<  88  4.2   |  21[L]  |  1.22    Ca    8.7      21 May 2025 05:55    TPro  5.7[L]  /  Alb  2.7[L]  /  TBili  0.7  /  DBili  x   /  AST  9[L]  /  ALT  15  /  AlkPhos  74  05-21      Urinalysis Basic - ( 21 May 2025 05:55 )    Color: x / Appearance: x / SG: x / pH: x  Gluc: 88 mg/dL / Ketone: x  / Bili: x / Urobili: x   Blood: x / Protein: x / Nitrite: x   Leuk Esterase: x / RBC: x / WBC x   Sq Epi: x / Non Sq Epi: x / Bacteria: x      CAPILLARY BLOOD GLUCOSE

## 2025-05-22 LAB
ALBUMIN SERPL ELPH-MCNC: 2.7 G/DL — LOW (ref 3.5–5)
ALP SERPL-CCNC: 64 U/L — SIGNIFICANT CHANGE UP (ref 40–120)
ALT FLD-CCNC: 13 U/L DA — SIGNIFICANT CHANGE UP (ref 10–60)
ANION GAP SERPL CALC-SCNC: 8 MMOL/L — SIGNIFICANT CHANGE UP (ref 5–17)
ANISOCYTOSIS BLD QL: SLIGHT — SIGNIFICANT CHANGE UP
AST SERPL-CCNC: 11 U/L — SIGNIFICANT CHANGE UP (ref 10–40)
BILIRUB SERPL-MCNC: 1.2 MG/DL — SIGNIFICANT CHANGE UP (ref 0.2–1.2)
BUN SERPL-MCNC: 33 MG/DL — HIGH (ref 7–18)
CALCIUM SERPL-MCNC: 8.8 MG/DL — SIGNIFICANT CHANGE UP (ref 8.4–10.5)
CHLORIDE SERPL-SCNC: 109 MMOL/L — HIGH (ref 96–108)
CO2 SERPL-SCNC: 20 MMOL/L — LOW (ref 22–31)
CREAT SERPL-MCNC: 1.36 MG/DL — HIGH (ref 0.5–1.3)
EGFR: 36 ML/MIN/1.73M2 — LOW
EGFR: 36 ML/MIN/1.73M2 — LOW
ELLIPTOCYTES BLD QL SMEAR: SLIGHT — SIGNIFICANT CHANGE UP
GLUCOSE SERPL-MCNC: 89 MG/DL — SIGNIFICANT CHANGE UP (ref 70–99)
HCT VFR BLD CALC: 26.7 % — LOW (ref 34.5–45)
HGB BLD-MCNC: 8.3 G/DL — LOW (ref 11.5–15.5)
HYPOCHROMIA BLD QL: SLIGHT — SIGNIFICANT CHANGE UP
IRON SATN MFR SERPL: 19 UG/DL — LOW (ref 40–150)
IRON SATN MFR SERPL: 6 % — LOW (ref 15–50)
LG PLATELETS BLD QL AUTO: SLIGHT — SIGNIFICANT CHANGE UP
MANUAL SMEAR VERIFICATION: SIGNIFICANT CHANGE UP
MCHC RBC-ENTMCNC: 26.3 PG — LOW (ref 27–34)
MCHC RBC-ENTMCNC: 31.1 G/DL — LOW (ref 32–36)
MCV RBC AUTO: 84.8 FL — SIGNIFICANT CHANGE UP (ref 80–100)
NRBC BLD AUTO-RTO: 0 /100 WBCS — SIGNIFICANT CHANGE UP (ref 0–0)
OVALOCYTES BLD QL SMEAR: SLIGHT — SIGNIFICANT CHANGE UP
PLAT MORPH BLD: NORMAL — SIGNIFICANT CHANGE UP
PLATELET # BLD AUTO: 248 K/UL — SIGNIFICANT CHANGE UP (ref 150–400)
PLATELET CLUMP BLD QL SMEAR: ABNORMAL
PLATELET COUNT - ESTIMATE: NORMAL — SIGNIFICANT CHANGE UP
POIKILOCYTOSIS BLD QL AUTO: SLIGHT — SIGNIFICANT CHANGE UP
POLYCHROMASIA BLD QL SMEAR: SLIGHT — SIGNIFICANT CHANGE UP
POTASSIUM SERPL-MCNC: 4.2 MMOL/L — SIGNIFICANT CHANGE UP (ref 3.5–5.3)
POTASSIUM SERPL-SCNC: 4.2 MMOL/L — SIGNIFICANT CHANGE UP (ref 3.5–5.3)
PROT SERPL-MCNC: 6.1 G/DL — SIGNIFICANT CHANGE UP (ref 6–8.3)
RBC # BLD: 3.15 M/UL — LOW (ref 3.8–5.2)
RBC # BLD: 3.15 M/UL — LOW (ref 3.8–5.2)
RBC # FLD: 17.4 % — HIGH (ref 10.3–14.5)
RBC BLD AUTO: ABNORMAL
RETICS #: 77.8 K/UL — SIGNIFICANT CHANGE UP (ref 25–125)
RETICS/RBC NFR: 2.5 % — SIGNIFICANT CHANGE UP (ref 0.5–2.5)
SODIUM SERPL-SCNC: 137 MMOL/L — SIGNIFICANT CHANGE UP (ref 135–145)
TIBC SERPL-MCNC: 340 UG/DL — SIGNIFICANT CHANGE UP (ref 250–450)
UIBC SERPL-MCNC: 321 UG/DL — SIGNIFICANT CHANGE UP (ref 110–370)
WBC # BLD: 10.46 K/UL — SIGNIFICANT CHANGE UP (ref 3.8–10.5)
WBC # FLD AUTO: 10.46 K/UL — SIGNIFICANT CHANGE UP (ref 3.8–10.5)

## 2025-05-22 PROCEDURE — 99232 SBSQ HOSP IP/OBS MODERATE 35: CPT

## 2025-05-22 RX ORDER — IRON SUCROSE 20 MG/ML
200 INJECTION, SOLUTION INTRAVENOUS EVERY 24 HOURS
Refills: 0 | Status: COMPLETED | OUTPATIENT
Start: 2025-05-22 | End: 2025-05-24

## 2025-05-22 RX ADMIN — HEPARIN SODIUM 5000 UNIT(S): 1000 INJECTION INTRAVENOUS; SUBCUTANEOUS at 18:01

## 2025-05-22 RX ADMIN — ATORVASTATIN CALCIUM 40 MILLIGRAM(S): 80 TABLET, FILM COATED ORAL at 21:22

## 2025-05-22 RX ADMIN — DEXTROMETHORPHAN HBR, GUAIFENESIN 200 MILLIGRAM(S): 200 LIQUID ORAL at 21:22

## 2025-05-22 RX ADMIN — HEPARIN SODIUM 5000 UNIT(S): 1000 INJECTION INTRAVENOUS; SUBCUTANEOUS at 05:40

## 2025-05-22 RX ADMIN — IRON SUCROSE 110 MILLIGRAM(S): 20 INJECTION, SOLUTION INTRAVENOUS at 20:50

## 2025-05-22 RX ADMIN — METOPROLOL SUCCINATE 25 MILLIGRAM(S): 50 TABLET, EXTENDED RELEASE ORAL at 05:40

## 2025-05-22 RX ADMIN — Medication 30 MILLIGRAM(S): at 05:40

## 2025-05-22 NOTE — PROGRESS NOTE ADULT - PROBLEM SELECTOR PLAN 1
p/w sob x 1 week   CXR -New small to moderate right pleural effusion and small left pleural effusion with adjacent airspace opacity  BNP 2.3 K   echo - 50-55%, grade 2 diastolic dysfunction   takes metoprolol, lasix, losartan as per superscripts   s/p lasix 40 mg in ER   -c/w toprol with holding parameters   -s/p tele  -Troponin neg x 1   -daily weights, strict I&O  -maintain oxygen sat >92%  -incentive spirometry  -Cardiology followed

## 2025-05-22 NOTE — PROGRESS NOTE ADULT - PROBLEM SELECTOR PLAN 3
-Echo with EF 50 to 55 %, moderate (grade 2) left ventricular diastolic dysfunction. Moderate MR, TR and CA. Elevated pulmonary artery pressure.  -s/p IV lasix  -c/w toprol with holding parameters   -See above

## 2025-05-22 NOTE — CHART NOTE - NSCHARTNOTESELECT_GEN_ALL_CORE
Nutrition Services
PPM Interrogation/Event Note
Palliative Care/Event Note
Event Note
urinary retention/Event Note

## 2025-05-22 NOTE — PROGRESS NOTE ADULT - NS ATTEND AMEND GEN_ALL_CORE FT
89 year old woman from home with hx including HTN, HLD , A-fib on OAC, hx of recent PPM procedure, chronic LLE edema here with progressive SOB, dry cough with congestion in the last week. Clinical exam with bibasilar lung crackles and  Imaging studies show B/L pleural effusion but no pulmonary vascular congestion. Now lungs are clear  Concern for acute right sided heart failure likely worsened by mitral valve regurgitation with cardiorenal failure.  There is also concern for high output cardiac failure from anemia with recent drop from baseline hgb   Patient GOC completed on 5/10, DNR/DNI  Patient's Hgb has improved and stabilized following PRBC transfusions.     She is alert and oriented, seated in chair.    Vital Signs Last 24 Hrs  T(C): 36.2 (22 May 2025 12:07), Max: 37.3 (22 May 2025 05:05)  T(F): 97.2 (22 May 2025 12:07), Max: 99.1 (22 May 2025 05:05)  HR: 67 (22 May 2025 12:07) (62 - 75)  BP: 125/69 (22 May 2025 12:07) (125/69 - 146/68)  BP(mean): --  RR: 18 (22 May 2025 12:07) (18 - 18)  SpO2: 93% (22 May 2025 12:07) (91% - 97%)    Parameters below as of 22 May 2025 12:07  Patient On (Oxygen Delivery Method): room air    Lungs, clear  Cor, irreg  Jarrett catheter                          8.3    10.46 )-----------( 248      ( 22 May 2025 07:43 )             26.7   05-22    137  |  109[H]  |  33[H]  ----------------------------<  89  4.2   |  20[L]  |  1.36[H]    Ca    8.8      22 May 2025 07:43    TPro  6.1  /  Alb  2.7[L]  /  TBili  1.2  /  DBili  x   /  AST  11  /  ALT  13  /  AlkPhos  64  05-22    % Saturation, Iron: 6 % (05.22.25 @ 07:43)     #AHRF 2/2  #Dyspnea, c/f CHF Exacerbation (HFpEF and severe pulmonary hypertension) (New Pleural Effusions)   #Anemia with iron deficiency requiring transfusion, stable  #S/p PPM  #CAD  #BATSHEVA on CKD, improving  #AFIB, holding AC,  but receiving DVT prophylaxis here  #Acute urinary retention, s/p Jarrett removal without urinary retention, at present.     -regarding TTE and imaging findings, per Cardiology here, very unlikely a thrombus would be seen on pacer wire from surface TTE alone, JULIA not in line with goals of care  -pacemaker interrogated, DC tele monitoring  -ECHO reviewed  - resume home antihypertensive nifedipine, continue metoprolol  -Hold OAC given anemia, not in lines with GOC. Stable HGb, this can be continued outpatient if patient and HCP changes their mind. Resumed heparin subcutaneous for dvt prophylaxis  -Repeat TOV   patient and proxies prefer to SONYA, awaiting auth    I have contacted [employer] for Peer to Peer discussion regarding rehab authorization. It was denied.   Reference number [id number].  Discussed with patient and proxy.  Patient feels she can't care for herself at home, as she has always done.   Anemia w/u.  Hematology consultation, Dr. De Souza

## 2025-05-22 NOTE — PROGRESS NOTE ADULT - PROBLEM SELECTOR PLAN 4
Known hx of afib  Pacemaker placed 3/2025   s/p PPM Interrogation-normal device functioning   recent TTE OP- echocardiogram outpatient that showed high pulmonary pressures and mitral valve regurgitation with left atrial enlargement.  Also noted possible clot around the pacemaker wire.   -ekg still in afib 5/9/25  -cont home  toprol   -restart eliquis 2.5 mg BID Known hx of afib  Pacemaker placed 3/2025   s/p PPM Interrogation-normal device functioning   recent TTE OP- echocardiogram outpatient that showed high pulmonary pressures and mitral valve regurgitation with left atrial enlargement.  Also noted possible clot around the pacemaker wire.   -ekg still in afib 5/9/25  -cont home  toprol   -dc eliquis 2.5 mg BID, restart if needed outpatient

## 2025-05-22 NOTE — CHART NOTE - NSCHARTNOTEFT_GEN_A_CORE
Assessment:   89yFemalePatient is a 89y old  Female who presents with a chief complaint of AHRF 2/2 CHF vs PNA (21 May 2025 19:31). Pt Visited. OOB to chair. Pt is [place]. Pt reports eating Good. Po tolerated. Pt is on Ensure Plus HP BID. Pt does not  Drink it any more. Initially Pt was drinking it well.. Labs noted Na WNL      Factors impacting intake: [ x] none [ ] nausea  [ ] vomiting [ ] diarrhea [ ] constipation  [ ]chewing problems [ ] swallowing issues  [ ] other:     Diet Prescription: Easy to chew 1200 ml fluid Rest /day, Low NA , Ensure Plus HP   Intake: > 50 % of meal.        Pertinent Medications: MEDICATIONS  (STANDING):  atorvastatin 40 milliGRAM(s) Oral at bedtime  chlorhexidine 2% Cloths 1 Application(s) Topical daily  heparin   Injectable 5000 Unit(s) SubCutaneous every 12 hours  metoprolol succinate ER 25 milliGRAM(s) Oral daily  NIFEdipine XL 30 milliGRAM(s) Oral daily  polyethylene glycol 3350 17 Gram(s) Oral daily    MEDICATIONS  (PRN):  acetaminophen     Tablet .. 650 milliGRAM(s) Oral every 6 hours PRN Temp greater or equal to 38C (100.4F), Mild Pain (1 - 3)  benzocaine/menthol Lozenge 1 Lozenge Oral every 6 hours PRN cough  guaiFENesin Oral Liquid (Sugar-Free) 200 milliGRAM(s) Oral every 6 hours PRN Cough    Pertinent Labs: 05-22 Na137 mmol/L Glu 89 mg/dL K+ 4.2 mmol/L Cr  1.36 mg/dL[H] BUN 33 mg/dL[H] 05-22 Alb 2.7 g/dL[L] 05-10 Chol 73 mg/dL LDL --    HDL 31 mg/dL[L] Trig 107 mg/dL     CAPILLARY BLOOD GLUCOSE        Skin: NO Pressure ulcer.     Estimated Needs:   [ ] no change since previous assessment  [ ] recalculated:     Previous Nutrition Diagnosis:   [ ] Inadequate Energy Intake [ x]Inadequate Oral Intake [ ] Excessive Energy Intake   [ ] Underweight [ ] Increased Nutrient Needs [ ] Overweight/Obesity   [ ] Altered GI Function [ ] Unintended Weight Loss [ ] Food & Nutrition Related Knowledge Deficit [ ] Malnutrition     Nutrition Diagnosis is [ ] ongoing  [ x] resolved [ ]           Interventions:   Recommend  [ ] Change Diet To:  [x ] Nutrition Supplement D/C Ensure Plus HP BID..  [ ] Nutrition Support  [ ] Other:     Monitoring and Evaluation:   [ ] PO intake [ x ] Tolerance to diet prescription [ x ] weights [  ] labs[ x ] follow up per protocol  [ ] other: Assessment:   89yFemalePatient is a 89y old  Female who presents with a chief complaint of AHRF 2/2 CHF vs PNA (21 May 2025 19:31). Pt Visited. OOB to chair. Pt is [place]. Pt reports eating Good. Po tolerated. Pt is on Ensure Plus HP BID. Pt does not  Drink it any more. Initially Pt was drinking it well.. Labs noted Na WNL      Factors impacting intake: [ x] none [ ] nausea  [ ] vomiting [ ] diarrhea [ ] constipation  [ ]chewing problems [ ] swallowing issues  [ ] other:     Diet Prescription: Easy to chew 1200 ml fluid Rest /day, Low NA , Ensure Plus HP   Intake: > 50 % of meal.        Pertinent Medications: MEDICATIONS  (STANDING):  atorvastatin 40 milliGRAM(s) Oral at bedtime  chlorhexidine 2% Cloths 1 Application(s) Topical daily  heparin   Injectable 5000 Unit(s) SubCutaneous every 12 hours  metoprolol succinate ER 25 milliGRAM(s) Oral daily  NIFEdipine XL 30 milliGRAM(s) Oral daily  polyethylene glycol 3350 17 Gram(s) Oral daily    MEDICATIONS  (PRN):  acetaminophen     Tablet .. 650 milliGRAM(s) Oral every 6 hours PRN Temp greater or equal to 38C (100.4F), Mild Pain (1 - 3)  benzocaine/menthol Lozenge 1 Lozenge Oral every 6 hours PRN cough  guaiFENesin Oral Liquid (Sugar-Free) 200 milliGRAM(s) Oral every 6 hours PRN Cough    Pertinent Labs: 05-22 Na137 mmol/L Glu 89 mg/dL K+ 4.2 mmol/L Cr  1.36 mg/dL[H] BUN 33 mg/dL[H] 05-22 Alb 2.7 g/dL[L] 05-10 Chol 73 mg/dL LDL --    HDL 31 mg/dL[L] Trig 107 mg/dL     CAPILLARY BLOOD GLUCOSE        Skin: NO Pressure ulcer.     Estimated Needs:   [ ] no change since previous assessment  [ ] recalculated:     Previous Nutrition Diagnosis:   [ ] Inadequate Energy Intake [ x]Inadequate Oral Intake [ ] Excessive Energy Intake   [ ] Underweight [ ] Increased Nutrient Needs [ ] Overweight/Obesity   [ ] Altered GI Function [ ] Unintended Weight Loss [ ] Food & Nutrition Related Knowledge Deficit [ ] Malnutrition     Nutrition Diagnosis is [ ] ongoing  [ x] resolved [ ]           Interventions:   Recommend  [ ] Change Diet To:  [x ] Nutrition Supplement D/C Ensure Plus HP BID..  [ ] Nutrition Support  [x). Diet Rx placed.:     Monitoring and Evaluation:   [ ] PO intake [ x ] Tolerance to diet prescription [ x ] weights [  ] labs[ x ] follow up per protocol  [ ] other:

## 2025-05-22 NOTE — PROGRESS NOTE ADULT - SUBJECTIVE AND OBJECTIVE BOX
Patient is a 89y old  Female who presents with a chief complaint of AHRF 2/2 CHF vs PNA (21 May 2025 19:31)    OVERNIGHT EVENTS: no acute changes.     Pt is aox3, comfortable appearing, tolerating PO, requires assistance with ambulation/out of bed to chair.     REVIEW OF SYSTEMS:  CONSTITUTIONAL: No fever, chills  ENMT:  No difficulty hearing, no change in vision  NECK: No pain or stiffness  RESPIRATORY: no shortness of breath or cough  CARDIOVASCULAR: No chest pain, palpitations  GASTROINTESTINAL: No abdominal pain. No nausea, vomiting, or diarrhea  GENITOURINARY: No dysuria  NEUROLOGICAL: No HA  SKIN: No itching, burning, rashes, or lesions   LYMPH NODES: No enlarged glands  ENDOCRINE: No heat or cold intolerance; No hair loss  MUSCULOSKELETAL: +right knee swelling   PSYCHIATRIC: No depression, anxiety  HEME/LYMPH: No easy bruising, or bleeding gums    T(C): 36.2 (05-22-25 @ 12:07), Max: 37.3 (05-22-25 @ 05:05)  HR: 67 (05-22-25 @ 12:07) (62 - 75)  BP: 125/69 (05-22-25 @ 12:07) (125/69 - 146/68)  RR: 18 (05-22-25 @ 12:07) (18 - 18)  SpO2: 93% (05-22-25 @ 12:07) (91% - 97%)  Wt(kg): --Vital Signs Last 24 Hrs  T(C): 36.2 (22 May 2025 12:07), Max: 37.3 (22 May 2025 05:05)  T(F): 97.2 (22 May 2025 12:07), Max: 99.1 (22 May 2025 05:05)  HR: 67 (22 May 2025 12:07) (62 - 75)  BP: 125/69 (22 May 2025 12:07) (125/69 - 146/68)  BP(mean): --  RR: 18 (22 May 2025 12:07) (18 - 18)  SpO2: 93% (22 May 2025 12:07) (91% - 97%)    Parameters below as of 22 May 2025 12:07  Patient On (Oxygen Delivery Method): room air        MEDICATIONS  (STANDING):  atorvastatin 40 milliGRAM(s) Oral at bedtime  chlorhexidine 2% Cloths 1 Application(s) Topical daily  heparin   Injectable 5000 Unit(s) SubCutaneous every 12 hours  metoprolol succinate ER 25 milliGRAM(s) Oral daily  NIFEdipine XL 30 milliGRAM(s) Oral daily  polyethylene glycol 3350 17 Gram(s) Oral daily    MEDICATIONS  (PRN):  acetaminophen     Tablet .. 650 milliGRAM(s) Oral every 6 hours PRN Temp greater or equal to 38C (100.4F), Mild Pain (1 - 3)  benzocaine/menthol Lozenge 1 Lozenge Oral every 6 hours PRN cough  guaiFENesin Oral Liquid (Sugar-Free) 200 milliGRAM(s) Oral every 6 hours PRN Cough      PHYSICAL EXAM:  GENERAL: NAD  EYES: clear conjunctiva  ENMT: Moist mucous membranes  NECK: Supple, No JVD, Normal thyroid  CHEST/LUNG: +mild bilateral crackles   HEART: S1, S2, Regular rate and rhythm  ABDOMEN: Soft, Nontender, Nondistended; Bowel sounds present  NEURO: Alert & Oriented X3  EXTREMITIES: +mild right knee swelling, no tenderness/erythema. No LE edema, no calf tenderness  LYMPH: No lymphadenopathy noted  SKIN: No rashes or lesions    Consultant(s) Notes Reviewed:  [x ] YES  [ ] NO  Care Discussed with Consultants/Other Providers [ x] YES  [ ] NO    LABS:                        8.3    10.46 )-----------( 248      ( 22 May 2025 07:43 )             26.7     05-22    137  |  109[H]  |  33[H]  ----------------------------<  89  4.2   |  20[L]  |  1.36[H]    Ca    8.8      22 May 2025 07:43    TPro  6.1  /  Alb  2.7[L]  /  TBili  1.2  /  DBili  x   /  AST  11  /  ALT  13  /  AlkPhos  64  05-22      CAPILLARY BLOOD GLUCOSE            Urinalysis Basic - ( 22 May 2025 07:43 )    Color: x / Appearance: x / SG: x / pH: x  Gluc: 89 mg/dL / Ketone: x  / Bili: x / Urobili: x   Blood: x / Protein: x / Nitrite: x   Leuk Esterase: x / RBC: x / WBC x   Sq Epi: x / Non Sq Epi: x / Bacteria: x        RADIOLOGY & ADDITIONAL TESTS:  < from: CT Abdomen and Pelvis No Cont (05.11.25 @ 18:36) >  ACC: [id number] EXAM:  CT ABDOMEN AND PELVIS   ORDERED BY: PHANI HOFF     PROCEDURE DATE:  05/11/2025          INTERPRETATION:  CLINICAL INFORMATION: Evaluate for hematoma.    COMPARISON: CT abdomen and pelvis dated 8/5/2019 and chestCT dated   5/9/2025.    CONTRAST/COMPLICATIONS:  IV Contrast: NONE  Oral Contrast: NONE    PROCEDURE:  CT of the Abdomen and Pelvis was performed.  Sagittal and coronal reformats were performed.    FINDINGS:  Evaluation of the solid organs and vasculature is somewhat limited due to   lack of intravenous contrast.    LOWER CHEST: Small moderate right and small left pleural effusions,   slightly increase in size from 5/9/2025. There is subtle dependent high   attenuation within the right pleural effusion (2-1) incompletely included   in the field-of-view and therefore limited in evaluation. Cardiomegaly   with coronary artery calcifications and mitral valve annular   calcifications.    LIVER: Within normal limits.  BILE DUCTS: Normal caliber.  GALLBLADDER: Mildly thick-walled, though underdistended.  SPLEEN: Within normal limits.  PANCREAS: Within normal limits.  ADRENALS: Similar size of a 1.5 x 2.4 cm right adrenal adenoma (2-33) and   C5 2018, requiring no further evaluation or follow-up. Mildly thickened   left adrenal gland, also chronic.  KIDNEYS/URETERS: There is a 1.4 cm exophytic posterior right upper pole   renal lesion measuring 69 Hounsfield units (2-44), previously a simple   cyst on 8/5/2018 and therefore favored representinterval development of   a hemorrhagic or proteinaceous debris. There is also a 1.5 cm hyperdense   right upper pole cyst (2-38) measuring 86 Hounsfield units in density.   There are a few additional probable bilateral renal cysts. There are   vascular calcifications within the bilateral renal sinus, limiting   evaluation for calculi. No hydronephrosis. There appears to be some   retained contrast within the renal cortex.    BLADDER: Excreted contrast is present within the urinary bladder.  REPRODUCTIVE ORGANS: Status post hysterectomy.    BOWEL: No bowel obstruction. Appendix is not definitely identified. A   left lower quadrant colonic anastomosis is present without evidence of   obstruction.  PERITONEUM/RETROPERITONEUM: Within normal limits.  VESSELS: Extensive calcified atherosclerosis of the abdominal aorta,   which is also tortuous. There are 2 femoral-femoral bypass grafts. No   aortic aneurysm.  LYMPH NODES: No lymphadenopathy.  ABDOMINAL WALL: Within normal limits.  BONES: The bones are markedly osteopenic and there are advanced   degenerative changes of the spine with partial, stricture of the mid   lumbar spine. Old healed left-sided rib fracture deformities are present.    IMPRESSION:  1. No evidence of intra-abdominal hematoma.  2. Probable subtle retained renal cortical contrast, suggestive of acute   tubular necrosis (ATN).  3. Slight increase in size of the moderate right and small left pleural   effusions with adjacent atelectasis.  4. There is new trace high attenuation within the upper aspect of the   right pleural effusion, which is nonspecific.    --- End of Report ---            JES COOK MD; Attending Radiologist  This document has been electronically signed. May 11 2025  9:00PM    < end of copied text >  < from: CT Angio Chest PE Protocol w/ IV Cont (05.09.25 @ 19:52) >    IMPRESSION:  1. No evidence of pulmonary embolism.  2. New small to moderate right pleural effusion and small left pleural   effusion with adjacent airspace opacity, favored to represent   atelectasis. Infection is considered less likely, though is possible in   the appropriate clinical setting.  3. Additional chronic and incidental findings are present as detailed   above.    < end of copied text >      Imaging Personally Reviewed:  [ ] YES  [ ] NO

## 2025-05-22 NOTE — PROGRESS NOTE ADULT - ASSESSMENT
89 y/o F from home, [place], ambulates with a cane/ walker at baseline, AOx 3 with PMHx  HTN, HLD, chronic LLE edema , afib on eliquis , recent PPM placement 3/25 presented to ER with c/o SOB on exertion, subjective fever, chills , non productive cough, chest congestion x 1 week.     CT chest noted for moderate right and small left pleural effusions with atelectasis. s/p lasix.     Admitted to medicine for AHRF 2/2 CHF and Anemia requiring transfusion. Palliative care followed, pt is no longer for hospice.   Pt also had a fraire for urinary retention, passed TOV on 5/21.   Pt was weened off oxygen - now tolerating room air.   Optimized for discharge. DC planning per CM.  89 y/o F [place] home, [place], ambulates with a cane/ walker at baseline, AOx 3 with PMHx  HTN, HLD, chronic LLE edema , afib on eliquis , recent PPM placement 3/25 presented to ER with c/o SOB on exertion, subjective fever, chills , non productive cough, chest congestion x 1 week.     CT chest noted for moderate right and small left pleural effusions with atelectasis. s/p lasix.     Admitted to medicine for AHRF 2/2 CHF and Anemia requiring transfusion. Palliative care followed, pt is no longer for hospice.   Eliquis now stopped due to recent Anemia.   Pt also had a fraire for urinary retention, passed TOV on 5/21.   Pt was weened off oxygen - now tolerating room air.   Optimized for discharge. DC planning per CM.

## 2025-05-22 NOTE — PROGRESS NOTE ADULT - PROBLEM SELECTOR PLAN 5
Noted CTA and carotid US  Patient evaluated by vascular surgery, recommended maximal medical management   h/o HTN on amlodipine, losartan, TOPROL  recent valerie   Scr now 1.36   - now on nifedipine 30 mg qd   - c/w toprol 25 mg qd

## 2025-05-22 NOTE — PROGRESS NOTE ADULT - PROBLEM SELECTOR PLAN 10
DVT ppx- eliquis   DNR/DNI - palliative care following    DC planning - optimized waiting on CM - insurance denied SONYA DVT ppx- SCDs   DNR/DNI   gi - tolerating PO    DC planning - optimized waiting on  - insurance denied SONYA

## 2025-05-22 NOTE — PROGRESS NOTE ADULT - PROBLEM SELECTOR PLAN 8
hx of CKD   no active external bleed   remote Hx of UGIB in the past   - Maintain active T&S, 2 large bore peripheral IVs, transfuse for goal hgb >7 or if symptomatic  hold eliquis  - s/p 1 u pRBC on 5/11  post-transfusion hgb stable.  - hemoglobin improved now 8.3

## 2025-05-23 DIAGNOSIS — C18.9 MALIGNANT NEOPLASM OF COLON, UNSPECIFIED: ICD-10-CM

## 2025-05-23 DIAGNOSIS — D50.9 IRON DEFICIENCY ANEMIA, UNSPECIFIED: ICD-10-CM

## 2025-05-23 LAB
PROT SERPL-MCNC: 5.1 G/DL — LOW (ref 6–8.3)
VIT B12 SERPL-MCNC: 362 PG/ML — SIGNIFICANT CHANGE UP (ref 232–1245)

## 2025-05-23 PROCEDURE — 99232 SBSQ HOSP IP/OBS MODERATE 35: CPT

## 2025-05-23 RX ORDER — FUROSEMIDE 10 MG/ML
40 INJECTION INTRAMUSCULAR; INTRAVENOUS DAILY
Refills: 0 | Status: DISCONTINUED | OUTPATIENT
Start: 2025-05-24 | End: 2025-05-26

## 2025-05-23 RX ORDER — FUROSEMIDE 10 MG/ML
20 INJECTION INTRAMUSCULAR; INTRAVENOUS DAILY
Refills: 0 | Status: DISCONTINUED | OUTPATIENT
Start: 2025-05-23 | End: 2025-05-23

## 2025-05-23 RX ADMIN — METOPROLOL SUCCINATE 25 MILLIGRAM(S): 50 TABLET, EXTENDED RELEASE ORAL at 05:26

## 2025-05-23 RX ADMIN — HEPARIN SODIUM 5000 UNIT(S): 1000 INJECTION INTRAVENOUS; SUBCUTANEOUS at 17:25

## 2025-05-23 RX ADMIN — POLYETHYLENE GLYCOL 3350 17 GRAM(S): 17 POWDER, FOR SOLUTION ORAL at 11:18

## 2025-05-23 RX ADMIN — HEPARIN SODIUM 5000 UNIT(S): 1000 INJECTION INTRAVENOUS; SUBCUTANEOUS at 05:26

## 2025-05-23 RX ADMIN — Medication 1 APPLICATION(S): at 11:15

## 2025-05-23 RX ADMIN — FUROSEMIDE 20 MILLIGRAM(S): 10 INJECTION INTRAMUSCULAR; INTRAVENOUS at 11:18

## 2025-05-23 RX ADMIN — ATORVASTATIN CALCIUM 40 MILLIGRAM(S): 80 TABLET, FILM COATED ORAL at 22:50

## 2025-05-23 RX ADMIN — IRON SUCROSE 110 MILLIGRAM(S): 20 INJECTION, SOLUTION INTRAVENOUS at 17:53

## 2025-05-23 RX ADMIN — Medication 30 MILLIGRAM(S): at 05:26

## 2025-05-23 NOTE — PROGRESS NOTE ADULT - PROBLEM SELECTOR PLAN 11
DVT ppx-hold eliquis iso bleed, SCD  DNR/DNI - palliative care following    Discharge planning : LTC with hospice planned now pt/HCP wish SONYA. PT re-consulted  f/u LAB 2 x week.
DVT ppx-hold eliquis iso bleed, SCD  DNR/DNI - palliative care following    Discharge planning : LTC with hospice planned now pt/HCP wish SONYA. PT re-consulted, Recs SONYA. CM following for placement.   f/u LAB 2 x week.
DVT ppx-hold eliquis iso bleed, SCD  DNR/DNI - palliative care following    Discharge planning : LTC with hospice
DVT ppx- SCDs   DNR/DNI   gi - tolerating PO    DC planning - optimized waiting on  - insurance denied SONYA  -needs home oxygen

## 2025-05-23 NOTE — PROGRESS NOTE ADULT - PROBLEM SELECTOR PLAN 7
Pt w/ SCr BUN/ Cr 88/ 1.66 from ( 28/1.26)   resolved - c/w atorvastatin 40 mg   - Lipid profile wnl  - Dash Diet

## 2025-05-23 NOTE — PROGRESS NOTE ADULT - SUBJECTIVE AND OBJECTIVE BOX
Patient is a 89y old  Female who presents with a chief complaint of AHRF 2/2 CHF vs PNA (22 May 2025 17:50)    OVERNIGHT EVENTS: no acute changes.     Pt is aox3, comfortable appearing, tolerating PO, able to get out of bed to chair with assistance.   Pt felt short of breath last night and 2L oxygen by nasal cannula replaced.     REVIEW OF SYSTEMS:  CONSTITUTIONAL: No fever, chills  ENMT:  No difficulty hearing, no change in vision  NECK: No pain or stiffness  RESPIRATORY: No cough, SOB  CARDIOVASCULAR: No chest pain, palpitations  GASTROINTESTINAL: No abdominal pain. No nausea, vomiting, or diarrhea  GENITOURINARY: No dysuria  NEUROLOGICAL: No HA  SKIN: No itching, burning, rashes, or lesions   LYMPH NODES: No enlarged glands  ENDOCRINE: No heat or cold intolerance; No hair loss  MUSCULOSKELETAL: No joint pain or swelling; No muscle, back, or extremity pain  PSYCHIATRIC: No depression, anxiety  HEME/LYMPH: No easy bruising, or bleeding gums    T(C): 36.6 (05-23-25 @ 11:53), Max: 36.8 (05-22-25 @ 20:00)  HR: 66 (05-23-25 @ 11:53) (63 - 79)  BP: 123/56 (05-23-25 @ 11:53) (102/56 - 161/64)  RR: 18 (05-23-25 @ 11:53) (16 - 24)  SpO2: 94% (05-23-25 @ 11:53) (82% - 98%)  Wt(kg): --Vital Signs Last 24 Hrs  T(C): 36.6 (23 May 2025 11:53), Max: 36.8 (22 May 2025 20:00)  T(F): 97.8 (23 May 2025 11:53), Max: 98.2 (22 May 2025 20:00)  HR: 66 (23 May 2025 11:53) (63 - 79)  BP: 123/56 (23 May 2025 11:53) (102/56 - 161/64)  BP(mean): --  RR: 18 (23 May 2025 11:53) (16 - 24)  SpO2: 94% (23 May 2025 11:53) (82% - 98%)    Parameters below as of 23 May 2025 11:53  Patient On (Oxygen Delivery Method): nasal cannula  O2 Flow (L/min): 2      MEDICATIONS  (STANDING):  atorvastatin 40 milliGRAM(s) Oral at bedtime  chlorhexidine 2% Cloths 1 Application(s) Topical daily  furosemide    Tablet 20 milliGRAM(s) Oral daily  heparin   Injectable 5000 Unit(s) SubCutaneous every 12 hours  iron sucrose IVPB 200 milliGRAM(s) IV Intermittent every 24 hours  metoprolol succinate ER 25 milliGRAM(s) Oral daily  NIFEdipine XL 30 milliGRAM(s) Oral daily  polyethylene glycol 3350 17 Gram(s) Oral daily    MEDICATIONS  (PRN):  acetaminophen     Tablet .. 650 milliGRAM(s) Oral every 6 hours PRN Temp greater or equal to 38C (100.4F), Mild Pain (1 - 3)  benzocaine/menthol Lozenge 1 Lozenge Oral every 6 hours PRN cough  guaiFENesin Oral Liquid (Sugar-Free) 200 milliGRAM(s) Oral every 6 hours PRN Cough      PHYSICAL EXAM:  GENERAL: NAD  EYES: clear conjunctiva  ENMT: Moist mucous membranes  NECK: Supple, No JVD, Normal thyroid  CHEST/LUNG: +mild bilateral crackles   HEART: S1, S2, Regular rate and rhythm  ABDOMEN: Soft, Nontender, Nondistended; Bowel sounds present  NEURO: Alert & Oriented X3  EXTREMITIES: +mild right knee swelling, no tenderness/erythema. No LE edema, no calf tenderness  LYMPH: No lymphadenopathy noted  SKIN: No rashes or lesions    Consultant(s) Notes Reviewed:  [x ] YES  [ ] NO  Care Discussed with Consultants/Other Providers [ x] YES  [ ] NO    LABS:                        8.3    10.46 )-----------( 248      ( 22 May 2025 07:43 )             26.7     05-22    137  |  109[H]  |  33[H]  ----------------------------<  89  4.2   |  20[L]  |  1.36[H]    Ca    8.8      22 May 2025 07:43    TPro  5.1[L]  /  Alb  x   /  TBili  x   /  DBili  x   /  AST  x   /  ALT  x   /  AlkPhos  x   05-23      CAPILLARY BLOOD GLUCOSE            Urinalysis Basic - ( 22 May 2025 07:43 )    Color: x / Appearance: x / SG: x / pH: x  Gluc: 89 mg/dL / Ketone: x  / Bili: x / Urobili: x   Blood: x / Protein: x / Nitrite: x   Leuk Esterase: x / RBC: x / WBC x   Sq Epi: x / Non Sq Epi: x / Bacteria: x        RADIOLOGY & ADDITIONAL TESTS:  < from: CT Abdomen and Pelvis No Cont (05.11.25 @ 18:36) >    ACC: [id number] EXAM:  CT ABDOMEN AND PELVIS   ORDERED BY: PHANI HOFF     PROCEDURE DATE:  05/11/2025          INTERPRETATION:  CLINICAL INFORMATION: Evaluate for hematoma.    COMPARISON: CT abdomen and pelvis dated 8/5/2019 and chestCT dated   5/9/2025.    CONTRAST/COMPLICATIONS:  IV Contrast: NONE  Oral Contrast: NONE    PROCEDURE:  CT of the Abdomen and Pelvis was performed.  Sagittal and coronal reformats were performed.    FINDINGS:  Evaluation of the solid organs and vasculature is somewhat limited due to   lack of intravenous contrast.    LOWER CHEST: Small moderate right and small left pleural effusions,   slightly increase in size from 5/9/2025. There is subtle dependent high   attenuation within the right pleural effusion (2-1) incompletely included   in the field-of-view and therefore limited in evaluation. Cardiomegaly   with coronary artery calcifications and mitral valve annular   calcifications.    LIVER: Within normal limits.  BILE DUCTS: Normal caliber.  GALLBLADDER: Mildly thick-walled, though underdistended.  SPLEEN: Within normal limits.  PANCREAS: Within normal limits.  ADRENALS: Similar size of a 1.5 x 2.4 cm right adrenal adenoma (2-33) and   C5 2018, requiring no further evaluation or follow-up. Mildly thickened   left adrenal gland, also chronic.  KIDNEYS/URETERS: There is a 1.4 cm exophytic posterior right upper pole   renal lesion measuring 69 Hounsfield units (2-44), previously a simple   cyst on 8/5/2018 and therefore favored representinterval development of   a hemorrhagic or proteinaceous debris. There is also a 1.5 cm hyperdense   right upper pole cyst (2-38) measuring 86 Hounsfield units in density.   There are a few additional probable bilateral renal cysts. There are   vascular calcifications within the bilateral renal sinus, limiting   evaluation for calculi. No hydronephrosis. There appears to be some   retained contrast within the renal cortex.    BLADDER: Excreted contrast is present within the urinary bladder.  REPRODUCTIVE ORGANS: Status post hysterectomy.    BOWEL: No bowel obstruction. Appendix is not definitely identified. A   left lower quadrant colonic anastomosis is present without evidence of   obstruction.  PERITONEUM/RETROPERITONEUM: Within normal limits.  VESSELS: Extensive calcified atherosclerosis of the abdominal aorta,   which is also tortuous. There are 2 femoral-femoral bypass grafts. No   aortic aneurysm.  LYMPH NODES: No lymphadenopathy.  ABDOMINAL WALL: Within normal limits.  BONES: The bones are markedly osteopenic and there are advanced   degenerative changes of the spine with partial, stricture of the mid   lumbar spine. Old healed left-sided rib fracture deformities are present.    IMPRESSION:  1. No evidence of intra-abdominal hematoma.  2. Probable subtle retained renal cortical contrast, suggestive of acute   tubular necrosis (ATN).  3. Slight increase in size of the moderate right and small left pleural   effusions with adjacent atelectasis.  4. There is new trace high attenuation within the upper aspect of the   right pleural effusion, which is nonspecific.    --- End of Report ---            JES COOK MD; Attending Radiologist  This document has been electronically signed. May 11 2025  9:00PM    < end of copied text >    Imaging Personally Reviewed:  [ ] YES  [ ] NO

## 2025-05-23 NOTE — PROGRESS NOTE ADULT - PROBLEM SELECTOR PLAN 3
-Echo with EF 50 to 55 %, moderate (grade 2) left ventricular diastolic dysfunction. Moderate MR, TR and MO. Elevated pulmonary artery pressure.  -s/p IV lasix  -c/w toprol with holding parameters   -restart lasix 20 mg qd   -See above -Echo with EF 50 to 55 %, moderate (grade 2) left ventricular diastolic dysfunction. Moderate MR, TR and TX. Elevated pulmonary artery pressure.  -s/p IV lasix  -c/w toprol with holding parameters   -restart lasix  -See above

## 2025-05-23 NOTE — PROGRESS NOTE ADULT - PROBLEM SELECTOR PLAN 8
hx of CKD   no active external bleed   remote Hx of UGIB in the past   - Maintain active T&S, 2 large bore peripheral IVs, transfuse for goal hgb >7 or if symptomatic  hold eliquis  - s/p 1 u pRBC on 5/11  post-transfusion hgb stable.  - hemoglobin improved now 8.3 Pt w/ SCr BUN/ Cr 88/ 1.66 from ( 28/1.26)   resolved

## 2025-05-23 NOTE — PROGRESS NOTE ADULT - NS ATTEND AMEND GEN_ALL_CORE FT
89 year old woman from home with hx including HTN, HLD , A-fib on OAC, hx of recent PPM procedure, chronic LLE edema here with progressive SOB, dry cough with congestion in the week PTA.  Admission clinical exam with bibasilar lung crackles and  Imaging studies show B/L pleural effusion but no pulmonary vascular congestion.  Concern for acute right sided heart failure likely worsened by mitral valve regurgitation with cardiorenal failure.     Patient GOC completed on 5/10, DNR/DNI  Patient's Hgb improved and stabilized following PRBC transfusions.     She is alert and oriented, seated in chair.    Vital Signs Last 24 Hrs  Vital Signs Last 24 Hrs  T(C): 36.6 (23 May 2025 11:53), Max: 36.8 (22 May 2025 20:00)  T(F): 97.8 (23 May 2025 11:53), Max: 98.2 (22 May 2025 20:00)  HR: 66 (23 May 2025 11:53) (63 - 79)  BP: 123/56 (23 May 2025 11:53) (102/56 - 161/64)  BP(mean): --  RR: 18 (23 May 2025 11:53) (16 - 24)  SpO2: 94% (23 May 2025 11:53) (82% - 98%)    Parameters below as of 23 May 2025 11:53  Patient On (Oxygen Delivery Method): nasal cannula  O2 Flow (L/min): 2  Lungs, bilateral air entry  Cor, irreg  Jarrett catheter                                   8.3    10.46 )-----------( 248      ( 22 May 2025 07:43 )             26.7   05-22    137  |  109[H]  |  33[H]  ----------------------------<  89  4.2   |  20[L]  |  1.36[H]    Ca    8.8      22 May 2025 07:43    TPro  5.1[L]  /  Alb  x   /  TBili  x   /  DBili  x   /  AST  x   /  ALT  x   /  AlkPhos  x   05-23      % Saturation, Iron: 6 % (05.22.25 @ 07:43)     #AHRF 2/2 CHF Exacerbation (HFpEF and severe pulmonary hypertension) (New Pleural Effusions)   #Anemia with iron deficiency requiring transfusion, stable; Patient has hx colon resection many years ago.  Abdominal CT during this admission shows anastemosis, no intraluminal lesion  #S/p PPM  #ASCVD with diffus calcifications  #BATSHEVA on CKD, improving  #AFIB, holding AC,  but receiving DVT prophylaxis here  #Acute urinary retention, s/p Jarrett removal without urinary retention, at present.     -pacemaker interrogated, DC tele monitoring  -ECHO reviewed  - resume home antihypertensive nifedipine, continue metoprolol  -Repeat CBC, will consider resuming oral anticoagulation  -TOV was successful    Hematology consultation, Dr. De Souza, appreciated.  Iron sucrose 200 mg IVPB daily x 3  Oral lasix 40 mg daily  Repeat CXR in AM

## 2025-05-23 NOTE — PROGRESS NOTE ADULT - PROBLEM SELECTOR PLAN 6
- c/w atorvastatin 40 mg   - Lipid profile wnl  - Dash Diet h/o HTN on amlodipine, losartan, TOPROL  recent valerie   Scr now 1.36   - now on nifedipine 30 mg qd   - c/w toprol 25 mg qd

## 2025-05-23 NOTE — PROGRESS NOTE ADULT - PROBLEM SELECTOR PLAN 4
Known hx of afib  Pacemaker placed 3/2025   s/p PPM Interrogation-normal device functioning   recent TTE OP- echocardiogram outpatient that showed high pulmonary pressures and mitral valve regurgitation with left atrial enlargement.  Also noted possible clot around the pacemaker wire.   -ekg still in afib 5/9/25  -cont home toprol   -dc eliquis 2.5 mg BID, restart if needed outpatient hgb 8.3  iron sat 6%  c/w iron infusion 200 mg qd x3 days 5/22-5/24  trend cbc  no active bleeding

## 2025-05-23 NOTE — PROGRESS NOTE ADULT - PROBLEM SELECTOR PLAN 1
p/w sob x 1 week   CXR -New small to moderate right pleural effusion and small left pleural effusion with adjacent airspace opacity  due to CHF   BNP 2.3 K   echo - 50-55%, grade 2 diastolic dysfunction   takes metoprolol, lasix, losartan as per superscripts   s/p lasix 40 mg in ER   -c/w toprol with holding parameters   -s/p tele  -Troponin neg x 1   -daily weights, strict I&O  -maintain oxygen sat >92%  -incentive spirometry  -Cardiology followed p/w sob x 1 week   due to CHF, anemia   CXR -New small to moderate right pleural effusion and small left pleural effusion with adjacent airspace opacity  BNP 2.3 K   echo - 50-55%, grade 2 diastolic dysfunction   -c/w lasix 20 mg qd   -home oxygen delivery  -daily weights, strict I&O  -maintain oxygen sat >92%  -incentive spirometry  -Cardiology followed p/w sob x 1 week   due to CHF, anemia   CXR -New small to moderate right pleural effusion and small left pleural effusion with adjacent airspace opacity  BNP 2.3 K   echo - 50-55%, grade 2 diastolic dysfunction   -restart lasix 40 mg qd   -home oxygen delivery  -daily weights, strict I&O  -maintain oxygen sat >92%  -incentive spirometry  -Cardiology followed

## 2025-05-23 NOTE — CONSULT NOTE ADULT - SUBJECTIVE AND OBJECTIVE BOX
89 year old woman from home with extensive medical hx including HTN, HLD , A-fib on OAC, hx of recent PPM procedure, chronic LLE edema here with progressive SOB, dry cough with congestion in the last week. Clinical exam with bibasilar lung crackles and  Imaging studies show B/L pleural effusion but no pulmonary vascular congestion.  Concern for acute right sided heart failure likely worsened by mitral valve regurgitation with cardiorenal failure.  There is also concern for high output cardiac failure from anemia with recent drop from baseline hgb   Patient GOC completed on 5/10, DNR/DNI, no blood transfusions.   Prognosis is guarded if patient has brisk bleed, she and HCP are aware.      Alert , oriented in no distress  Lungs: Clear  Co RR   abdomen: Benign        Unsaturated Iron Binding Capacity  ug/dL   Iron with Total Binding Capacity (05.22.25 @ 07:43)   Iron - Total Binding Capacity.: 340 ug/dL  % Saturation, Iron: 6 %  Iron Total: 19 ug/dL  Unsaturated Iron Binding Capacity: 321 ug/dL  Auto NRBC: 0 /100 WBCs  WBC Count: 10.46 K/uL  RBC Count: 3.15 M/uL  Hemoglobin: 8.3 g/dL  Hematocrit: 26.7 %  Mean Cell Volume: 84.8 fl  Mean Cell Hemoglobin: 26.3 pg  Mean Cell Hemoglobin Conc: 31.1 g/dL  Red Cell Distrib Width: 17.4 %  Platelet Count - Automated: 248 K/uL  Sodium: 137 mmol/L  Potassium: 4.2 mmol/L  Chloride: 109 mmol/L  Carbon Dioxide: 20 mmol/L  Anion Gap: 8 mmol/L  Blood Urea Nitrogen: 33 mg/dL  Creatinine: 1.36 mg/dL  Glucose: 89 mg/dL  Calcium: 8.8 mg/dL  Protein Total: 6.1 g/dL  Albumin: 2.7 g/dL  Bilirubin Total: 1.2 mg/dL  Alkaline Phosphatase: 64 U/L  Aspartate Aminotransferase (AST/SGOT): 11 U/L  Alanine Aminotransferase (ALT/SGPT): 13 U/L DA  eGFR: 36: The estimated glomerular filtration rate (eGFR) calculation is based on   the 2021 CKD-EPI creatinine equation, which is validated in male and   female population 18 years of age and older (N Engl J Med 2021;   [id number]). mL/min/1.73m2

## 2025-05-23 NOTE — PROGRESS NOTE ADULT - PROBLEM SELECTOR PLAN 9
-Balanced diet  -aspiration precaution  -nutrition consulted hx of CKD   no active external bleed   remote Hx of UGIB in the past   - Maintain active T&S, 2 large bore peripheral IVs, transfuse for goal hgb >7 or if symptomatic  hold eliquis  - s/p 1 u pRBC on 5/11  post-transfusion hgb stable.  - hemoglobin improved now 8.3

## 2025-05-23 NOTE — PROGRESS NOTE ADULT - PROBLEM SELECTOR PLAN 10
DVT ppx- SCDs   DNR/DNI   gi - tolerating PO    DC planning - optimized waiting on  - insurance denied SONYA  -needs home oxygen -Balanced diet  -aspiration precaution  -nutrition consulted

## 2025-05-23 NOTE — CONSULT NOTE ADULT - ASSESSMENT
Sever anemia, and Iron deficiency  Undoubtedly due to bleeding   but with a contribution of renal insufficiency  Agree with transfusions  Also IV Iron Infussion 200 mg IV daily X5  If possible and gently GI W/U  Will F/U
89 year old admitted for tiredness and sob , has significant anemia

## 2025-05-23 NOTE — PROGRESS NOTE ADULT - PROBLEM SELECTOR PLAN 5
h/o HTN on amlodipine, losartan, TOPROL  recent valerie   Scr now 1.36   - now on nifedipine 30 mg qd   - c/w toprol 25 mg qd Known hx of afib  Pacemaker placed 3/2025   s/p PPM Interrogation-normal device functioning   recent TTE OP- echocardiogram outpatient that showed high pulmonary pressures and mitral valve regurgitation with left atrial enlargement.  Also noted possible clot around the pacemaker wire.   -ekg still in afib 5/9/25  -cont home toprol   -dc eliquis 2.5 mg BID, restart if needed outpatient

## 2025-05-23 NOTE — PROGRESS NOTE ADULT - ASSESSMENT
89 y/o F from home, [place], ambulates with a cane/ walker at baseline, AOx 3 with PMHx  HTN, HLD, chronic LLE edema , afib on eliquis , recent PPM placement 3/25 presented to ER with c/o SOB on exertion, subjective fever, chills , non productive cough, chest congestion x 1 week.     CT chest noted for moderate right and small left pleural effusions with atelectasis. s/p lasix.     Admitted to medicine for AHRF 2/2 CHF and Anemia requiring transfusion. Palliative care followed, pt is no longer for hospice.   Eliquis now stopped due to recent Anemia.   Pt also had a fraire for urinary retention, passed TOV on 5/21.   Pt initially weened off oxygen, was 92% at rest, however pt has dyspnea on exertion due to pleural effusions, desat. 82% with physical therapy today.   Home Oxygen ordered. Optimized for discharge. DC planning per CM.  89 y/o F from home, [place], ambulates with a cane/ walker at baseline, AOx 3 with PMHx  HTN, HLD, chronic LLE edema , afib on eliquis , recent PPM placement 3/25 presented to ER with c/o SOB on exertion, subjective fever, chills , non productive cough, chest congestion x 1 week.     CT chest noted for moderate right and small left pleural effusions with atelectasis. s/p lasix.     Admitted to medicine for AHRF 2/2 CHF and Anemia requiring transfusion. Palliative care followed, pt is no longer for hospice.   Eliquis now stopped due to recent Anemia.   Pt also had a fraire for urinary retention, passed TOV on 5/21.   Pt initially weened off oxygen, was 92% at rest, however pt has dyspnea on exertion due to pleural effusions, desat. 82% with physical therapy today.   Lasix resumed at 40 mg qd.   Home Oxygen ordered. Optimized for discharge. DC planning per CM.

## 2025-05-24 DIAGNOSIS — E88.09 OTHER DISORDERS OF PLASMA-PROTEIN METABOLISM, NOT ELSEWHERE CLASSIFIED: ICD-10-CM

## 2025-05-24 DIAGNOSIS — I50.33 ACUTE ON CHRONIC DIASTOLIC (CONGESTIVE) HEART FAILURE: ICD-10-CM

## 2025-05-24 DIAGNOSIS — E53.8 DEFICIENCY OF OTHER SPECIFIED B GROUP VITAMINS: ICD-10-CM

## 2025-05-24 LAB
ANION GAP SERPL CALC-SCNC: 7 MMOL/L — SIGNIFICANT CHANGE UP (ref 5–17)
BUN SERPL-MCNC: 30 MG/DL — HIGH (ref 7–18)
CALCIUM SERPL-MCNC: 8.8 MG/DL — SIGNIFICANT CHANGE UP (ref 8.4–10.5)
CHLORIDE SERPL-SCNC: 115 MMOL/L — HIGH (ref 96–108)
CO2 SERPL-SCNC: 17 MMOL/L — LOW (ref 22–31)
CREAT SERPL-MCNC: 1.33 MG/DL — HIGH (ref 0.5–1.3)
EGFR: 37 ML/MIN/1.73M2 — LOW
EGFR: 37 ML/MIN/1.73M2 — LOW
GLUCOSE SERPL-MCNC: 91 MG/DL — SIGNIFICANT CHANGE UP (ref 70–99)
POTASSIUM SERPL-MCNC: 4.4 MMOL/L — SIGNIFICANT CHANGE UP (ref 3.5–5.3)
POTASSIUM SERPL-SCNC: 4.4 MMOL/L — SIGNIFICANT CHANGE UP (ref 3.5–5.3)
SODIUM SERPL-SCNC: 139 MMOL/L — SIGNIFICANT CHANGE UP (ref 135–145)

## 2025-05-24 PROCEDURE — 71045 X-RAY EXAM CHEST 1 VIEW: CPT | Mod: 26

## 2025-05-24 PROCEDURE — 99232 SBSQ HOSP IP/OBS MODERATE 35: CPT

## 2025-05-24 RX ORDER — CYANOCOBALAMIN 1000 UG/ML
1000 INJECTION INTRAMUSCULAR; SUBCUTANEOUS DAILY
Refills: 0 | Status: DISCONTINUED | OUTPATIENT
Start: 2025-05-24 | End: 2025-05-26

## 2025-05-24 RX ADMIN — IRON SUCROSE 110 MILLIGRAM(S): 20 INJECTION, SOLUTION INTRAVENOUS at 18:07

## 2025-05-24 RX ADMIN — METOPROLOL SUCCINATE 25 MILLIGRAM(S): 50 TABLET, EXTENDED RELEASE ORAL at 05:55

## 2025-05-24 RX ADMIN — ATORVASTATIN CALCIUM 40 MILLIGRAM(S): 80 TABLET, FILM COATED ORAL at 22:36

## 2025-05-24 RX ADMIN — HEPARIN SODIUM 5000 UNIT(S): 1000 INJECTION INTRAVENOUS; SUBCUTANEOUS at 18:09

## 2025-05-24 RX ADMIN — CYANOCOBALAMIN 1000 MICROGRAM(S): 1000 INJECTION INTRAMUSCULAR; SUBCUTANEOUS at 15:42

## 2025-05-24 RX ADMIN — Medication 30 MILLIGRAM(S): at 05:55

## 2025-05-24 RX ADMIN — HEPARIN SODIUM 5000 UNIT(S): 1000 INJECTION INTRAVENOUS; SUBCUTANEOUS at 05:55

## 2025-05-24 NOTE — PROGRESS NOTE ADULT - SUBJECTIVE AND OBJECTIVE BOX
MEDICAL ATTENDING NOTE  Patient is a 89y old  Female who presents with a chief complaint of AHRF 2/2 CHF vs PNA (23 May 2025 17:34)      HPI:  Left voice message for Mckayla Jackson who pt identified as HCP with call back number   89/ F from home, [place], ambulates with a cane/ walker at baseline, AOx 3 with PMHx  HTN, HLD, afib on eliquis , recent PPM placement 3/25 presented to ER with c/o SOB on exertion, subjective fever, chills , non productive cough, chest congestion x 1 week , worsening over past 2 days. Denies chest pain, recent sick contacts, recent travel. Denies nausea, vomiting , constipation , diarrhea.     Of note : as per chart review and ER sign out - Had an echocardiogram outpatient that showed high pulmonary pressures and mitral valve regurgitation with left atrial enlargement.  Also noted possible clot around the pacemaker wire.      In  the ER   VS - / 79, RR20, , afebrile, sats 92% on 2  L NC     EKG - afib not in RVR    Labs s/o Hb 8 ( from 11.4), neutrophilic shift , BUN/ Cr 88/ 1.66 from ( 28/1.26) , lact 3.3, p BNP 2.3 K     CTA PE   1. No evidence of pulmonary embolism.  2. New small to moderate right pleural effusion and small left pleural   effusion with adjacent airspace opacity, favored to represent   atelectasis. Infection is considered less likely, though is possible in   the appropriate clinical setting.  3. Additional chronic and incidental findings are present as detailed   above.    CXR  Cardiomegaly.  Peribronchial haziness in the right lower lobe with small right pleural   effusion, concerning for pneumonia.    S/p lasix 40 , CTX 2 g  (09 May 2025 23:45)      INTERVAL HPI/OVERNIGHT EVENTS: no new complaints    MEDICATIONS  (STANDING):  atorvastatin 40 milliGRAM(s) Oral at bedtime  chlorhexidine 2% Cloths 1 Application(s) Topical daily  cyanocobalamin Injectable 1000 MICROGram(s) IntraMuscular daily  furosemide    Tablet 40 milliGRAM(s) Oral daily  heparin   Injectable 5000 Unit(s) SubCutaneous every 12 hours  metoprolol succinate ER 25 milliGRAM(s) Oral daily  NIFEdipine XL 30 milliGRAM(s) Oral daily  polyethylene glycol 3350 17 Gram(s) Oral daily    MEDICATIONS  (PRN):  acetaminophen     Tablet .. 650 milliGRAM(s) Oral every 6 hours PRN Temp greater or equal to 38C (100.4F), Mild Pain (1 - 3)  benzocaine/menthol Lozenge 1 Lozenge Oral every 6 hours PRN cough  guaiFENesin Oral Liquid (Sugar-Free) 200 milliGRAM(s) Oral every 6 hours PRN Cough      Vital Signs Last 24 Hrs  T(C): 36.3 (24 May 2025 11:40), Max: 36.8 (24 May 2025 05:25)  T(F): 97.4 (24 May 2025 11:40), Max: 98.3 (24 May 2025 05:25)  HR: 76 (24 May 2025 11:40) (65 - 76)  BP: 148/76 (24 May 2025 11:40) (132/79 - 148/76)  BP(mean): --  RR: 19 (24 May 2025 11:40) (17 - 19)  SpO2: 93% (24 May 2025 11:40) (92% - 93%)    Parameters below as of 24 May 2025 11:40  Patient On (Oxygen Delivery Method): nasal cannula  O2 Flow (L/min): 2      ________________________________________________  PHYSICAL EXAM:  GENERAL: NAD, supine in bed  HEENT: Normocephalic;  conjunctivae and sclerae clear; moist mucous membranes;   NECK : supple  CHEST/LUNG: Clear to auscultation bilaterally with good air entry   HEART: S1 S2  regular; no murmurs, gallops or rubs  ABDOMEN: Soft, Nontender, Nondistended; Bowel sounds present  EXTREMITIES: no cyanosis; no edema; no calf tenderness  SKIN: warm and dry; no rash  NERVOUS SYSTEM:  Awake and alert; Oriented  to place, person and time ; no new deficits    _________________________________________________  LABS:    05-24    139  |  115[H]  |  30[H]  ----------------------------<  91  4.4   |  17[L]  |  1.33[H]    Ca    8.8      24 May 2025 08:05    TPro  5.1[L]  /  Alb  x   /  TBili  x   /  DBili  x   /  AST  x   /  ALT  x   /  AlkPhos  x   05-23      Urinalysis Basic - ( 24 May 2025 08:05 )    Color: x / Appearance: x / SG: x / pH: x  Gluc: 91 mg/dL / Ketone: x  / Bili: x / Urobili: x   Blood: x / Protein: x / Nitrite: x   Leuk Esterase: x / RBC: x / WBC x   Sq Epi: x / Non Sq Epi: x / Bacteria: x      CAPILLARY BLOOD GLUCOSE

## 2025-05-24 NOTE — PROGRESS NOTE ADULT - NS ATTEND AMEND GEN_ALL_CORE FT
89 year old woman from home with hx including HTN, HLD , A-fib on OAC, hx of recent PPM procedure, chronic LLE edema here with progressive SOB, dry cough with congestion in the week PTA.  Admission clinical exam with bibasilar lung crackles and  Imaging studies show B/L pleural effusion but no pulmonary vascular congestion.  Concern for acute right sided heart failure likely worsened by mitral valve regurgitation with cardiorenal failure.     Patient GOC completed on 5/10, DNR/DNI  Patient's Hgb improved and stabilized following PRBC transfusions.     She is alert and oriented, supine in bed  Vital Signs Last 24 Hrs  T(C): 36.3 (24 May 2025 11:40), Max: 36.8 (24 May 2025 05:25)  T(F): 97.4 (24 May 2025 11:40), Max: 98.3 (24 May 2025 05:25)  HR: 76 (24 May 2025 11:40) (65 - 76)  BP: 148/76 (24 May 2025 11:40) (132/79 - 148/76)  BP(mean): --  RR: 19 (24 May 2025 11:40) (17 - 19)  SpO2: 93% (24 May 2025 11:40) (92% - 93%)    Parameters below as of 24 May 2025 11:40  Patient On (Oxygen Delivery Method): nasal cannula  O2 Flow (L/min): 2      Parameters below as of 23 May 2025 11:53  Patient On (Oxygen Delivery Method): nasal cannula  O2 Flow (L/min): 2  Lungs, bilateral air entry  Cor, irreg  Jarrett catheter                              8.3    10.46 )-----------( 248      ( 22 May 2025 07:43 )             26.7   05-24    139  |  115[H]  |  30[H]  ----------------------------<  91  4.4   |  17[L]  |  1.33[H]    Ca    8.8      24 May 2025 08:05    TPro  5.1[L]  /  Alb  x   /  TBili  x   /  DBili  x   /  AST  x   /  ALT  x   /  AlkPhos  x   05-23    < from: Xray Chest 1 View- PORTABLE-Routine (Xray Chest 1 View- PORTABLE-Routine in AM.) (05.24.25 @ 08:35) >    IMPRESSION: Congestion and/or infiltrates with improvement in comparison   to prior examination of the chest 5/9/2025. Small bilateral effusion    % Saturation, Iron: 6 % (05.22.25 @ 07:43)     #AHRF 2/2 CHF Exacerbation (HFpEF and severe pulmonary hypertension) (New Pleural Effusions)   #Anemia with iron deficiency requiring transfusion, stable; Patient has hx colon resection many years ago.  Abdominal CT during this admission shows anastemosis, no intraluminal lesion  #S/p PPM  #ASCVD with diffus calcifications  #BATSHEVA on CKD, improving  #AFIB, holding AC,  but receiving DVT prophylaxis here  #Acute urinary retention, s/p Jarrett removal without urinary retention, at present.   #NAGMA  #borderline B12  #CHFpEF, improved bilateral effusions    -pacemaker interrogated, DC tele monitoring  -ECHO reviewed  - resume home antihypertensive nifedipine, continue metoprolol  -Repeat CBC, will consider resuming oral anticoagulation  -TOV was successful    Hematology consultation, Dr. De Souza, appreciated.  Iron sucrose 200 mg IVPB daily x 3  Oral lasix 40 mg daily  B12 parenteral supplement 89 year old woman from home with hx including HTN, HLD , A-fib on OAC, hx of recent PPM procedure, chronic LLE edema here with progressive SOB, dry cough with congestion in the week PTA.  Admission clinical exam with bibasilar lung crackles and  Imaging studies show B/L pleural effusion but no pulmonary vascular congestion.  Concern for acute right sided heart failure likely worsened by mitral valve regurgitation with cardiorenal failure.     Patient GOC completed on 5/10, DNR/DNI  Patient's Hgb improved and stabilized following PRBC transfusions.     She is alert and oriented, supine in bed  Vital Signs Last 24 Hrs  T(C): 36.3 (24 May 2025 11:40), Max: 36.8 (24 May 2025 05:25)  T(F): 97.4 (24 May 2025 11:40), Max: 98.3 (24 May 2025 05:25)  HR: 76 (24 May 2025 11:40) (65 - 76)  BP: 148/76 (24 May 2025 11:40) (132/79 - 148/76)  BP(mean): --  RR: 19 (24 May 2025 11:40) (17 - 19)  SpO2: 93% (24 May 2025 11:40) (92% - 93%)    Parameters below as of 24 May 2025 11:40  Patient On (Oxygen Delivery Method): nasal cannula  O2 Flow (L/min): 2      Parameters below as of 23 May 2025 11:53  Patient On (Oxygen Delivery Method): nasal cannula  O2 Flow (L/min): 2  Lungs, bilateral air entry  Cor, irreg  Jarrett catheter                              8.3    10.46 )-----------( 248      ( 22 May 2025 07:43 )             26.7   05-24    139  |  115[H]  |  30[H]  ----------------------------<  91  4.4   |  17[L]  |  1.33[H]    Ca    8.8      24 May 2025 08:05    TPro  5.1[L]  /  Alb  x   /  TBili  x   /  DBili  x   /  AST  x   /  ALT  x   /  AlkPhos  x   05-23    < from: Xray Chest 1 View- PORTABLE-Routine (Xray Chest 1 View- PORTABLE-Routine in AM.) (05.24.25 @ 08:35) >    IMPRESSION: Congestion and/or infiltrates with improvement in comparison   to prior examination of the chest 5/9/2025. Small bilateral effusion    % Saturation, Iron: 6 % (05.22.25 @ 07:43)     #AHRF 2/2 CHF Exacerbation (HFpEF and severe pulmonary hypertension) (New Pleural Effusions)   #Anemia with iron deficiency requiring transfusion, stable; Patient has hx colon resection many years ago.  Abdominal CT during this admission shows anastemosis, no intraluminal lesion  #S/p PPM  #ASCVD with diffuse calcifications  #BATSHEVA on CKD, improving  #AFIB, holding AC,  but receiving DVT prophylaxis here  #Acute urinary retention, s/p Jarrett removal without urinary retention, at present.   #NAGMA  #borderline B12  #CHFpEF, improved bilateral effusions  #hypoalbuminemia secondary to protein-calorie malnutrition    -pacemaker interrogated, DC tele monitoring  -ECHO reviewed  - resume home antihypertensive nifedipine, continue metoprolol  -Repeat CBC, will consider resuming oral anticoagulation  -TOV was successful    Hematology consultation, Dr. De Souza, appreciated.  Iron sucrose 200 mg IVPB daily x 3  Oral lasix 40 mg daily  B12 parenteral supplement

## 2025-05-25 LAB
ALBUMIN SERPL ELPH-MCNC: 2.7 G/DL — LOW (ref 3.5–5)
ALP SERPL-CCNC: 72 U/L — SIGNIFICANT CHANGE UP (ref 40–120)
ALT FLD-CCNC: 18 U/L DA — SIGNIFICANT CHANGE UP (ref 10–60)
ANION GAP SERPL CALC-SCNC: 8 MMOL/L — SIGNIFICANT CHANGE UP (ref 5–17)
AST SERPL-CCNC: 18 U/L — SIGNIFICANT CHANGE UP (ref 10–40)
BASOPHILS # BLD AUTO: 0.08 K/UL — SIGNIFICANT CHANGE UP (ref 0–0.2)
BASOPHILS NFR BLD AUTO: 1.1 % — SIGNIFICANT CHANGE UP (ref 0–2)
BILIRUB SERPL-MCNC: 0.7 MG/DL — SIGNIFICANT CHANGE UP (ref 0.2–1.2)
BUN SERPL-MCNC: 30 MG/DL — HIGH (ref 7–18)
CALCIUM SERPL-MCNC: 8.8 MG/DL — SIGNIFICANT CHANGE UP (ref 8.4–10.5)
CHLORIDE SERPL-SCNC: 115 MMOL/L — HIGH (ref 96–108)
CO2 SERPL-SCNC: 18 MMOL/L — LOW (ref 22–31)
CREAT SERPL-MCNC: 1.36 MG/DL — HIGH (ref 0.5–1.3)
EGFR: 36 ML/MIN/1.73M2 — LOW
EGFR: 36 ML/MIN/1.73M2 — LOW
EOSINOPHIL # BLD AUTO: 0.27 K/UL — SIGNIFICANT CHANGE UP (ref 0–0.5)
EOSINOPHIL NFR BLD AUTO: 3.8 % — SIGNIFICANT CHANGE UP (ref 0–6)
GLUCOSE SERPL-MCNC: 91 MG/DL — SIGNIFICANT CHANGE UP (ref 70–99)
HCT VFR BLD CALC: 25 % — LOW (ref 34.5–45)
HGB BLD-MCNC: 7.8 G/DL — LOW (ref 11.5–15.5)
IMM GRANULOCYTES NFR BLD AUTO: 1.1 % — HIGH (ref 0–0.9)
INR BLD: 1.11 RATIO — SIGNIFICANT CHANGE UP (ref 0.85–1.16)
LYMPHOCYTES # BLD AUTO: 0.82 K/UL — LOW (ref 1–3.3)
LYMPHOCYTES # BLD AUTO: 11.5 % — LOW (ref 13–44)
MCHC RBC-ENTMCNC: 26.6 PG — LOW (ref 27–34)
MCHC RBC-ENTMCNC: 31.2 G/DL — LOW (ref 32–36)
MCV RBC AUTO: 85.3 FL — SIGNIFICANT CHANGE UP (ref 80–100)
MONOCYTES # BLD AUTO: 0.83 K/UL — SIGNIFICANT CHANGE UP (ref 0–0.9)
MONOCYTES NFR BLD AUTO: 11.7 % — SIGNIFICANT CHANGE UP (ref 2–14)
NEUTROPHILS # BLD AUTO: 5.04 K/UL — SIGNIFICANT CHANGE UP (ref 1.8–7.4)
NEUTROPHILS NFR BLD AUTO: 70.8 % — SIGNIFICANT CHANGE UP (ref 43–77)
NRBC BLD AUTO-RTO: 0 /100 WBCS — SIGNIFICANT CHANGE UP (ref 0–0)
PLATELET # BLD AUTO: 224 K/UL — SIGNIFICANT CHANGE UP (ref 150–400)
POTASSIUM SERPL-MCNC: 4.2 MMOL/L — SIGNIFICANT CHANGE UP (ref 3.5–5.3)
POTASSIUM SERPL-SCNC: 4.2 MMOL/L — SIGNIFICANT CHANGE UP (ref 3.5–5.3)
PROT SERPL-MCNC: 6 G/DL — SIGNIFICANT CHANGE UP (ref 6–8.3)
PROTHROM AB SERPL-ACNC: 12.9 SEC — SIGNIFICANT CHANGE UP (ref 9.9–13.4)
RBC # BLD: 2.93 M/UL — LOW (ref 3.8–5.2)
RBC # FLD: 18.2 % — HIGH (ref 10.3–14.5)
SODIUM SERPL-SCNC: 141 MMOL/L — SIGNIFICANT CHANGE UP (ref 135–145)
WBC # BLD: 7.12 K/UL — SIGNIFICANT CHANGE UP (ref 3.8–10.5)
WBC # FLD AUTO: 7.12 K/UL — SIGNIFICANT CHANGE UP (ref 3.8–10.5)

## 2025-05-25 PROCEDURE — 99232 SBSQ HOSP IP/OBS MODERATE 35: CPT

## 2025-05-25 RX ORDER — IRON SUCROSE 20 MG/ML
200 INJECTION, SOLUTION INTRAVENOUS ONCE
Refills: 0 | Status: COMPLETED | OUTPATIENT
Start: 2025-05-25 | End: 2025-05-26

## 2025-05-25 RX ORDER — B1/B2/B3/B5/B6/B12/VIT C/FOLIC 500-0.5 MG
1 TABLET ORAL
Qty: 30 | Refills: 0
Start: 2025-05-25 | End: 2025-06-23

## 2025-05-25 RX ORDER — LOSARTAN POTASSIUM 100 MG/1
1 TABLET, FILM COATED ORAL
Qty: 30 | Refills: 0
Start: 2025-05-25 | End: 2025-06-23

## 2025-05-25 RX ORDER — FERROUS SULFATE 137(45) MG
1 TABLET, EXTENDED RELEASE ORAL
Qty: 30 | Refills: 0
Start: 2025-05-25 | End: 2025-06-23

## 2025-05-25 RX ORDER — METOPROLOL SUCCINATE 50 MG/1
1 TABLET, EXTENDED RELEASE ORAL
Qty: 30 | Refills: 0
Start: 2025-05-25 | End: 2025-06-23

## 2025-05-25 RX ADMIN — Medication 30 MILLIGRAM(S): at 05:41

## 2025-05-25 RX ADMIN — ATORVASTATIN CALCIUM 40 MILLIGRAM(S): 80 TABLET, FILM COATED ORAL at 22:22

## 2025-05-25 RX ADMIN — FUROSEMIDE 40 MILLIGRAM(S): 10 INJECTION INTRAMUSCULAR; INTRAVENOUS at 05:41

## 2025-05-25 RX ADMIN — METOPROLOL SUCCINATE 25 MILLIGRAM(S): 50 TABLET, EXTENDED RELEASE ORAL at 05:41

## 2025-05-25 RX ADMIN — CYANOCOBALAMIN 1000 MICROGRAM(S): 1000 INJECTION INTRAMUSCULAR; SUBCUTANEOUS at 11:59

## 2025-05-25 RX ADMIN — HEPARIN SODIUM 5000 UNIT(S): 1000 INJECTION INTRAVENOUS; SUBCUTANEOUS at 05:42

## 2025-05-25 NOTE — PROGRESS NOTE ADULT - NS ATTEND AMEND GEN_ALL_CORE FT
89 year old woman from home with hx including HTN, HLD , A-fib on OAC, hx of recent PPM procedure, chronic LLE edema here with progressive SOB, dry cough with congestion in the week PTA.  Admission clinical exam with bibasilar lung crackles and  Imaging studies show B/L pleural effusion but no pulmonary vascular congestion.  Concern for acute right sided heart failure likely worsened by mitral valve regurgitation with cardiorenal failure.     Patient GOC completed on 5/10, DNR/DNI  Patient's Hgb improved and stabilized following PRBC transfusions.     She is alert and oriented, seated in chair  Vital Signs Last 24 Hrs  T(C): 36.3 (25 May 2025 20:13), Max: 36.7 (25 May 2025 05:00)  T(F): 97.3 (25 May 2025 20:13), Max: 98 (25 May 2025 05:00)  HR: 74 (25 May 2025 20:13) (74 - 77)  BP: 152/67 (25 May 2025 20:13) (152/67 - 158/63)  BP(mean): --  RR: 19 (25 May 2025 20:13) (18 - 20)  SpO2: 92% (25 May 2025 20:13) (92% - 93%)    Parameters below as of 25 May 2025 20:13  Patient On (Oxygen Delivery Method): nasal cannula  O2 Flow (L/min): 2      Parameters below as of 23 May 2025 11:53  Patient On (Oxygen Delivery Method): nasal cannula  O2 Flow (L/min): 2  Lungs, bilateral air entry  Cor, irreg  Jarrett catheter                                  7.8    7.12  )-----------( 224      ( 25 May 2025 05:45 )             25.0   05-25    141  |  115[H]  |  30[H]  ----------------------------<  91  4.2   |  18[L]  |  1.36[H]    Ca    8.8      25 May 2025 05:45    TPro  6.0  /  Alb  2.7[L]  /  TBili  0.7  /  DBili  x   /  AST  18  /  ALT  18  /  AlkPhos  72  05-25      < from: Xray Chest 1 View- PORTABLE-Routine (Xray Chest 1 View- PORTABLE-Routine in AM.) (05.24.25 @ 08:35) >    IMPRESSION: Congestion and/or infiltrates with improvement in comparison   to prior examination of the chest 5/9/2025. Small bilateral effusion    % Saturation, Iron: 6 % (05.22.25 @ 07:43)     #AHRF 2/2 CHF Exacerbation (HFpEF and severe pulmonary hypertension) (New Pleural Effusions)   #Anemia with iron deficiency requiring transfusion, stable; Patient has hx colon resection many years ago.  Abdominal CT during this admission shows anastemosis, no intraluminal lesion  #S/p PPM  #ASCVD with diffuse calcifications  #BATSHEVA on CKD, improving  #AFIB, holding AC,  but receiving DVT prophylaxis here  #Acute urinary retention, s/p Jarrett removal without urinary retention, at present.   #NAGMA  #borderline B12  #CHFpEF, improved bilateral effusions  #hypoalbuminemia secondary to protein-calorie malnutrition    -pacemaker interrogated, DC tele monitoring  -ECHO reviewed  - resume home antihypertensive nifedipine, continue metoprolol  -Repeat CBC, will consider resuming oral anticoagulation  -TOV was successful    Hematology consultation, Dr. De Souza, appreciated.  Iron sucrose 200 mg IVPB daily x 3  Oral lasix 40 mg daily  B12 parenteral supplement

## 2025-05-25 NOTE — PROGRESS NOTE ADULT - PROBLEM SELECTOR PLAN 10
DVT ppx- SCDs   DNR/DNI   gi - tolerating PO    DC planning - optimized waiting on  - insurance denied SONYA

## 2025-05-25 NOTE — PROGRESS NOTE ADULT - REASON FOR ADMISSION
AHRF 2/2 CHF vs PNA

## 2025-05-25 NOTE — PROGRESS NOTE ADULT - PROVIDER SPECIALTY LIST ADULT
Cardiology
Internal Medicine
Cardiology
Internal Medicine
Cardiology
Internal Medicine

## 2025-05-25 NOTE — PROGRESS NOTE ADULT - PROBLEM SELECTOR PLAN 4
Known hx of afib  Pacemaker placed 3/2025   s/p PPM Interrogation-normal device functioning   recent TTE OP- echocardiogram outpatient that showed high pulmonary pressures and mitral valve regurgitation with left atrial enlargement.  Also noted possible clot around the pacemaker wire.   -ekg still in afib 5/9/25  -cont home  toprol   -dc eliquis 2.5 mg BID, restart if needed outpatient

## 2025-05-25 NOTE — PROGRESS NOTE ADULT - ASSESSMENT
89 y/o F from home, [place], ambulates with a cane/ walker at baseline, AOx 3 with PMHx  HTN, HLD, chronic LLE edema , afib on eliquis , recent PPM placement 3/25 presented to ER with c/o SOB on exertion, subjective fever, chills , non productive cough, chest congestion x 1 week.     CT chest noted for moderate right and small left pleural effusions with atelectasis. s/p lasix.     Admitted to medicine for AHRF 2/2 CHF and Anemia requiring transfusion. Palliative care followed, pt is no longer for hospice.   Eliquis now stopped due to recent Anemia.   Pt also had a fraire for urinary retention, passed TOV on 5/21.   Pt was weened off oxygen - now tolerating room air.   Optimized for discharge. DC planning per CM.

## 2025-05-25 NOTE — PROGRESS NOTE ADULT - PROBLEM SELECTOR PROBLEM 1
Chronic atrial fibrillation
Acute hypoxic respiratory failure

## 2025-05-25 NOTE — PROGRESS NOTE ADULT - SUBJECTIVE AND OBJECTIVE BOX
MEDICAL ATTENDING NOTE  Patient is a 89y old  Female who presents with a chief complaint of AHRF 2/2 CHF vs PNA (24 May 2025 20:04)      HPI:  Left voice message for Mckayla Jackson who pt identified as HCP with call back number   89/ F from home, [place], ambulates with a cane/ walker at baseline, AOx 3 with PMHx  HTN, HLD, afib on eliquis , recent PPM placement 3/25 presented to ER with c/o SOB on exertion, subjective fever, chills , non productive cough, chest congestion x 1 week , worsening over past 2 days. Denies chest pain, recent sick contacts, recent travel. Denies nausea, vomiting , constipation , diarrhea.     Of note : as per chart review and ER sign out - Had an echocardiogram outpatient that showed high pulmonary pressures and mitral valve regurgitation with left atrial enlargement.  Also noted possible clot around the pacemaker wire.      In  the ER   VS - / 79, RR20, , afebrile, sats 92% on 2  L NC     EKG - afib not in RVR    Labs s/o Hb 8 ( from 11.4), neutrophilic shift , BUN/ Cr 88/ 1.66 from ( 28/1.26) , lact 3.3, p BNP 2.3 K     CTA PE   1. No evidence of pulmonary embolism.  2. New small to moderate right pleural effusion and small left pleural   effusion with adjacent airspace opacity, favored to represent   atelectasis. Infection is considered less likely, though is possible in   the appropriate clinical setting.  3. Additional chronic and incidental findings are present as detailed   above.    CXR  Cardiomegaly.  Peribronchial haziness in the right lower lobe with small right pleural   effusion, concerning for pneumonia.    S/p lasix 40 , CTX 2 g  (09 May 2025 23:45)      INTERVAL HPI/OVERNIGHT EVENTS: no new complaints    MEDICATIONS  (STANDING):  atorvastatin 40 milliGRAM(s) Oral at bedtime  cyanocobalamin Injectable 1000 MICROGram(s) IntraMuscular daily  furosemide    Tablet 40 milliGRAM(s) Oral daily  metoprolol succinate ER 25 milliGRAM(s) Oral daily  NIFEdipine XL 30 milliGRAM(s) Oral daily  polyethylene glycol 3350 17 Gram(s) Oral daily    MEDICATIONS  (PRN):  acetaminophen     Tablet .. 650 milliGRAM(s) Oral every 6 hours PRN Temp greater or equal to 38C (100.4F), Mild Pain (1 - 3)  benzocaine/menthol Lozenge 1 Lozenge Oral every 6 hours PRN cough  guaiFENesin Oral Liquid (Sugar-Free) 200 milliGRAM(s) Oral every 6 hours PRN Cough      __________________________________________________  REVIEW OF SYSTEMS:    CONSTITUTIONAL: No fever,   EYES: no acute visual disturbances  NECK: No pain or stiffness  RESPIRATORY: No cough; No shortness of breath  CARDIOVASCULAR: No chest pain, no palpitations  GASTROINTESTINAL: No pain. No nausea or vomiting; No diarrhea   NEUROLOGICAL: No headache or numbness, no tremors  MUSCULOSKELETAL: No joint pain, no muscle pain  GENITOURINARY: no dysuria, no frequency, no hesitancy  PSYCHIATRY: no depression , no anxiety  ALL OTHER  ROS negative        Vital Signs Last 24 Hrs  T(C): 36.3 (25 May 2025 20:13), Max: 36.7 (25 May 2025 05:00)  T(F): 97.3 (25 May 2025 20:13), Max: 98 (25 May 2025 05:00)  HR: 74 (25 May 2025 20:13) (74 - 77)  BP: 152/67 (25 May 2025 20:13) (152/67 - 158/63)  BP(mean): --  RR: 19 (25 May 2025 20:13) (18 - 20)  SpO2: 92% (25 May 2025 20:13) (92% - 93%)    Parameters below as of 25 May 2025 20:13  Patient On (Oxygen Delivery Method): nasal cannula  O2 Flow (L/min): 2      ________________________________________________  PHYSICAL EXAM:  GENERAL: NAD, alert, seated in chair  HEENT: Normocephalic;  conjunctivae and sclerae clear; moist mucous membranes;   NECK : supple  CHEST/LUNG: Clear to auscultation bilaterally with good air entry, minimal dullnes at bases  HEART: S1 S2  regular; no murmurs, gallops or rubs  ABDOMEN: Soft, Nontender, Nondistended; Bowel sounds present  EXTREMITIES: no cyanosis; no edema; no calf tenderness  SKIN: warm and dry; no rash  NERVOUS SYSTEM:  Awake and alert; Oriented  to place, person and time ; no new deficits    _________________________________________________  LABS:                        7.8    7.12  )-----------( 224      ( 25 May 2025 05:45 )             25.0     05-25    141  |  115[H]  |  30[H]  ----------------------------<  91  4.2   |  18[L]  |  1.36[H]    Ca    8.8      25 May 2025 05:45    TPro  6.0  /  Alb  2.7[L]  /  TBili  0.7  /  DBili  x   /  AST  18  /  ALT  18  /  AlkPhos  72  05-25    PT/INR - ( 25 May 2025 05:45 )   PT: 12.9 sec;   INR: 1.11 ratio           Urinalysis Basic - ( 25 May 2025 05:45 )    Color: x / Appearance: x / SG: x / pH: x  Gluc: 91 mg/dL / Ketone: x  / Bili: x / Urobili: x   Blood: x / Protein: x / Nitrite: x   Leuk Esterase: x / RBC: x / WBC x   Sq Epi: x / Non Sq Epi: x / Bacteria: x      CAPILLARY BLOOD GLUCOSE

## 2025-05-25 NOTE — PROGRESS NOTE ADULT - PROBLEM SELECTOR PLAN 5
h/o HTN on amlodipine, losartan, TOPROL  recent valerie   Scr now 1.36   - now on nifedipine 30 mg qd   - c/w toprol 25 mg qd

## 2025-05-25 NOTE — PROGRESS NOTE ADULT - PROBLEM SELECTOR PROBLEM 2
Anemia
Pleural effusion

## 2025-05-25 NOTE — PROGRESS NOTE ADULT - NS ATTEND OPT1 GEN_ALL_CORE

## 2025-05-25 NOTE — PROGRESS NOTE ADULT - PROBLEM SELECTOR PLAN 3
-Echo with EF 50 to 55 %, moderate (grade 2) left ventricular diastolic dysfunction. Moderate MR, TR and RI. Elevated pulmonary artery pressure.  -s/p IV lasix  -c/w toprol with holding parameters   -See above

## 2025-05-26 ENCOUNTER — TRANSCRIPTION ENCOUNTER (OUTPATIENT)
Age: 89
End: 2025-05-26

## 2025-05-26 VITALS
DIASTOLIC BLOOD PRESSURE: 76 MMHG | HEART RATE: 71 BPM | SYSTOLIC BLOOD PRESSURE: 161 MMHG | OXYGEN SATURATION: 94 % | TEMPERATURE: 98 F | RESPIRATION RATE: 18 BRPM

## 2025-05-26 PROCEDURE — 80048 BASIC METABOLIC PNL TOTAL CA: CPT

## 2025-05-26 PROCEDURE — 80053 COMPREHEN METABOLIC PANEL: CPT

## 2025-05-26 PROCEDURE — 85027 COMPLETE CBC AUTOMATED: CPT

## 2025-05-26 PROCEDURE — 97164 PT RE-EVAL EST PLAN CARE: CPT

## 2025-05-26 PROCEDURE — 85730 THROMBOPLASTIN TIME PARTIAL: CPT

## 2025-05-26 PROCEDURE — 99285 EMERGENCY DEPT VISIT HI MDM: CPT

## 2025-05-26 PROCEDURE — 84484 ASSAY OF TROPONIN QUANT: CPT

## 2025-05-26 PROCEDURE — 83880 ASSAY OF NATRIURETIC PEPTIDE: CPT

## 2025-05-26 PROCEDURE — 86738 MYCOPLASMA ANTIBODY: CPT

## 2025-05-26 PROCEDURE — 84443 ASSAY THYROID STIM HORMONE: CPT

## 2025-05-26 PROCEDURE — 84145 PROCALCITONIN (PCT): CPT

## 2025-05-26 PROCEDURE — 86900 BLOOD TYPING SEROLOGIC ABO: CPT

## 2025-05-26 PROCEDURE — 82570 ASSAY OF URINE CREATININE: CPT

## 2025-05-26 PROCEDURE — 83036 HEMOGLOBIN GLYCOSYLATED A1C: CPT

## 2025-05-26 PROCEDURE — 80061 LIPID PANEL: CPT

## 2025-05-26 PROCEDURE — 84100 ASSAY OF PHOSPHORUS: CPT

## 2025-05-26 PROCEDURE — 85652 RBC SED RATE AUTOMATED: CPT

## 2025-05-26 PROCEDURE — 86140 C-REACTIVE PROTEIN: CPT

## 2025-05-26 PROCEDURE — 84540 ASSAY OF URINE/UREA-N: CPT

## 2025-05-26 PROCEDURE — 87637 SARSCOV2&INF A&B&RSV AMP PRB: CPT

## 2025-05-26 PROCEDURE — 93306 TTE W/DOPPLER COMPLETE: CPT

## 2025-05-26 PROCEDURE — 81003 URINALYSIS AUTO W/O SCOPE: CPT

## 2025-05-26 PROCEDURE — 83735 ASSAY OF MAGNESIUM: CPT

## 2025-05-26 PROCEDURE — 84300 ASSAY OF URINE SODIUM: CPT

## 2025-05-26 PROCEDURE — P9040: CPT

## 2025-05-26 PROCEDURE — 84165 PROTEIN E-PHORESIS SERUM: CPT

## 2025-05-26 PROCEDURE — 82607 VITAMIN B-12: CPT

## 2025-05-26 PROCEDURE — 85045 AUTOMATED RETICULOCYTE COUNT: CPT

## 2025-05-26 PROCEDURE — 86901 BLOOD TYPING SEROLOGIC RH(D): CPT

## 2025-05-26 PROCEDURE — 96374 THER/PROPH/DIAG INJ IV PUSH: CPT

## 2025-05-26 PROCEDURE — 84156 ASSAY OF PROTEIN URINE: CPT

## 2025-05-26 PROCEDURE — 85610 PROTHROMBIN TIME: CPT

## 2025-05-26 PROCEDURE — 83540 ASSAY OF IRON: CPT

## 2025-05-26 PROCEDURE — 86850 RBC ANTIBODY SCREEN: CPT

## 2025-05-26 PROCEDURE — 71275 CT ANGIOGRAPHY CHEST: CPT

## 2025-05-26 PROCEDURE — 83935 ASSAY OF URINE OSMOLALITY: CPT

## 2025-05-26 PROCEDURE — 84155 ASSAY OF PROTEIN SERUM: CPT

## 2025-05-26 PROCEDURE — 74176 CT ABD & PELVIS W/O CONTRAST: CPT

## 2025-05-26 PROCEDURE — 36430 TRANSFUSION BLD/BLD COMPNT: CPT

## 2025-05-26 PROCEDURE — 86334 IMMUNOFIX E-PHORESIS SERUM: CPT

## 2025-05-26 PROCEDURE — 84133 ASSAY OF URINE POTASSIUM: CPT

## 2025-05-26 PROCEDURE — 83550 IRON BINDING TEST: CPT

## 2025-05-26 PROCEDURE — 97530 THERAPEUTIC ACTIVITIES: CPT

## 2025-05-26 PROCEDURE — 71045 X-RAY EXAM CHEST 1 VIEW: CPT

## 2025-05-26 PROCEDURE — 97110 THERAPEUTIC EXERCISES: CPT

## 2025-05-26 PROCEDURE — 83605 ASSAY OF LACTIC ACID: CPT

## 2025-05-26 PROCEDURE — 85025 COMPLETE CBC W/AUTO DIFF WBC: CPT

## 2025-05-26 PROCEDURE — 87040 BLOOD CULTURE FOR BACTERIA: CPT

## 2025-05-26 PROCEDURE — 97116 GAIT TRAINING THERAPY: CPT

## 2025-05-26 PROCEDURE — 36415 COLL VENOUS BLD VENIPUNCTURE: CPT

## 2025-05-26 PROCEDURE — 86923 COMPATIBILITY TEST ELECTRIC: CPT

## 2025-05-26 PROCEDURE — 96375 TX/PRO/DX INJ NEW DRUG ADDON: CPT

## 2025-05-26 PROCEDURE — 93005 ELECTROCARDIOGRAM TRACING: CPT

## 2025-05-26 RX ORDER — APIXABAN 2.5 MG/1
1 TABLET, FILM COATED ORAL
Qty: 60 | Refills: 0
Start: 2025-05-26 | End: 2025-06-24

## 2025-05-26 RX ORDER — FUROSEMIDE 10 MG/ML
1 INJECTION INTRAMUSCULAR; INTRAVENOUS
Qty: 30 | Refills: 0
Start: 2025-05-26 | End: 2025-07-24

## 2025-05-26 RX ADMIN — FUROSEMIDE 40 MILLIGRAM(S): 10 INJECTION INTRAMUSCULAR; INTRAVENOUS at 05:19

## 2025-05-26 RX ADMIN — Medication 30 MILLIGRAM(S): at 05:19

## 2025-05-26 RX ADMIN — METOPROLOL SUCCINATE 25 MILLIGRAM(S): 50 TABLET, EXTENDED RELEASE ORAL at 05:19

## 2025-05-26 RX ADMIN — IRON SUCROSE 110 MILLIGRAM(S): 20 INJECTION, SOLUTION INTRAVENOUS at 01:05

## 2025-05-26 NOTE — DISCHARGE NOTE NURSING/CASE MANAGEMENT/SOCIAL WORK - PATIENT PORTAL LINK FT
You can access the FollowMyHealth Patient Portal offered by [facility] by registering at the following website: [email or web address]. By joining [employer]’s FollowMyHealth portal, you will also be able to view your health information using other applications (apps) compatible with our system.

## 2025-05-26 NOTE — DISCHARGE NOTE NURSING/CASE MANAGEMENT/SOCIAL WORK - NSDCVIVACCINE_GEN_ALL_CORE_FT
influenza, injectable, quadrivalent, preservative free; 18-Sep-2017 15:34; Enoch Martínez (RN); [employer]; [id number]; IntraMuscular; Deltoid Right.; 0.5 milliLiter(s); VIS (VIS Published: 07-Aug-2015, VIS Presented: 18-Sep-2017);

## 2025-05-26 NOTE — DISCHARGE NOTE NURSING/CASE MANAGEMENT/SOCIAL WORK - FINANCIAL ASSISTANCE
[facility] provides services at a reduced cost to those who are determined to be eligible through [facility]’s financial assistance program. Information regarding [facility]’s financial assistance program can be found by going to [email or web address] or by calling [phone].

## 2025-05-26 NOTE — DISCHARGE NOTE NURSING/CASE MANAGEMENT/SOCIAL WORK - NSDCFUADDAPPT_GEN_ALL_CORE_FT
APPTS ARE READY TO BE MADE: [X] YES    Best Family or Patient Contact (if needed):    Additional Information about above appointments (if needed):    1: PCP Dr. Latham   2: Cardio Dr. Barrera  3: Hematology Dr. De Souza    Other comments or requests:

## 2025-05-27 LAB
% ALBUMIN: 53.4 % — SIGNIFICANT CHANGE UP
% ALPHA 1: 8.7 % — SIGNIFICANT CHANGE UP
% ALPHA 2: 13.6 % — SIGNIFICANT CHANGE UP
% BETA: 13 % — SIGNIFICANT CHANGE UP
% GAMMA: 11.3 % — SIGNIFICANT CHANGE UP
ALBUMIN SERPL ELPH-MCNC: 2.7 G/DL — LOW (ref 3.6–5.5)
ALBUMIN/GLOB SERPL ELPH: 1.1 RATIO — SIGNIFICANT CHANGE UP
ALPHA1 GLOB SERPL ELPH-MCNC: 0.4 G/DL — SIGNIFICANT CHANGE UP (ref 0.1–0.4)
ALPHA2 GLOB SERPL ELPH-MCNC: 0.7 G/DL — SIGNIFICANT CHANGE UP (ref 0.5–1)
B-GLOBULIN SERPL ELPH-MCNC: 0.7 G/DL — SIGNIFICANT CHANGE UP (ref 0.5–1)
GAMMA GLOBULIN: 0.6 G/DL — SIGNIFICANT CHANGE UP (ref 0.6–1.6)
INTERPRETATION SERPL IFE-IMP: SIGNIFICANT CHANGE UP
PROT PATTERN SERPL ELPH-IMP: SIGNIFICANT CHANGE UP
PROT SERPL-MCNC: 5.1 G/DL — LOW (ref 6–8.3)

## 2025-07-11 ENCOUNTER — APPOINTMENT (OUTPATIENT)
Dept: ELECTROPHYSIOLOGY | Facility: CLINIC | Age: 89
End: 2025-07-11